# Patient Record
Sex: FEMALE | Race: WHITE | Employment: UNEMPLOYED | URBAN - METROPOLITAN AREA
[De-identification: names, ages, dates, MRNs, and addresses within clinical notes are randomized per-mention and may not be internally consistent; named-entity substitution may affect disease eponyms.]

---

## 2017-01-23 PROBLEM — I48.3 TYPICAL ATRIAL FLUTTER (HCC): Status: ACTIVE | Noted: 2017-01-23

## 2017-01-23 PROBLEM — Z01.810 PREOP CARDIOVASCULAR EXAM: Status: ACTIVE | Noted: 2017-01-23

## 2017-03-13 PROBLEM — F33.9 MAJOR DEPRESSIVE DISORDER, RECURRENT (HCC): Status: ACTIVE | Noted: 2017-03-13

## 2017-06-08 PROBLEM — F41.1 GENERALIZED ANXIETY DISORDER: Status: ACTIVE | Noted: 2017-06-08

## 2017-12-04 ENCOUNTER — ANESTHESIA EVENT (OUTPATIENT)
Dept: ENDOSCOPY | Age: 62
End: 2017-12-04
Payer: MEDICARE

## 2017-12-04 RX ORDER — HYDROMORPHONE HYDROCHLORIDE 2 MG/ML
0.5 INJECTION, SOLUTION INTRAMUSCULAR; INTRAVENOUS; SUBCUTANEOUS
Status: CANCELLED | OUTPATIENT
Start: 2017-12-04

## 2017-12-04 RX ORDER — ONDANSETRON 2 MG/ML
4 INJECTION INTRAMUSCULAR; INTRAVENOUS ONCE
Status: CANCELLED | OUTPATIENT
Start: 2017-12-04 | End: 2017-12-04

## 2017-12-04 RX ORDER — OXYCODONE HYDROCHLORIDE 5 MG/1
5 TABLET ORAL
Status: CANCELLED | OUTPATIENT
Start: 2017-12-04

## 2017-12-04 RX ORDER — SODIUM CHLORIDE, SODIUM LACTATE, POTASSIUM CHLORIDE, CALCIUM CHLORIDE 600; 310; 30; 20 MG/100ML; MG/100ML; MG/100ML; MG/100ML
75 INJECTION, SOLUTION INTRAVENOUS CONTINUOUS
Status: CANCELLED | OUTPATIENT
Start: 2017-12-04

## 2017-12-04 RX ORDER — NALOXONE HYDROCHLORIDE 0.4 MG/ML
0.1 INJECTION, SOLUTION INTRAMUSCULAR; INTRAVENOUS; SUBCUTANEOUS AS NEEDED
Status: CANCELLED | OUTPATIENT
Start: 2017-12-04 | End: 2017-12-04

## 2017-12-04 RX ORDER — DIPHENHYDRAMINE HYDROCHLORIDE 50 MG/ML
12.5 INJECTION, SOLUTION INTRAMUSCULAR; INTRAVENOUS ONCE
Status: CANCELLED | OUTPATIENT
Start: 2017-12-04 | End: 2017-12-04

## 2017-12-04 RX ORDER — SODIUM CHLORIDE 0.9 % (FLUSH) 0.9 %
5-10 SYRINGE (ML) INJECTION AS NEEDED
Status: CANCELLED | OUTPATIENT
Start: 2017-12-04

## 2017-12-04 RX ORDER — ACETAMINOPHEN 500 MG
500 TABLET ORAL ONCE
Status: CANCELLED | OUTPATIENT
Start: 2017-12-04 | End: 2017-12-04

## 2017-12-04 RX ORDER — OXYCODONE HYDROCHLORIDE 5 MG/1
10 TABLET ORAL
Status: CANCELLED | OUTPATIENT
Start: 2017-12-04

## 2017-12-05 ENCOUNTER — HOSPITAL ENCOUNTER (OUTPATIENT)
Age: 62
Setting detail: OUTPATIENT SURGERY
Discharge: HOME | End: 2017-12-05
Attending: INTERNAL MEDICINE | Admitting: INTERNAL MEDICINE
Payer: MEDICARE

## 2017-12-05 ENCOUNTER — ANESTHESIA (OUTPATIENT)
Dept: ENDOSCOPY | Age: 62
End: 2017-12-05
Payer: MEDICARE

## 2017-12-05 VITALS
SYSTOLIC BLOOD PRESSURE: 134 MMHG | RESPIRATION RATE: 16 BRPM | HEIGHT: 68 IN | BODY MASS INDEX: 43.95 KG/M2 | HEART RATE: 69 BPM | DIASTOLIC BLOOD PRESSURE: 68 MMHG | WEIGHT: 290 LBS | TEMPERATURE: 97.6 F | OXYGEN SATURATION: 96 %

## 2017-12-05 LAB — GLUCOSE BLD STRIP.AUTO-MCNC: 238 MG/DL (ref 65–100)

## 2017-12-05 PROCEDURE — 82962 GLUCOSE BLOOD TEST: CPT

## 2017-12-05 PROCEDURE — 74011250636 HC RX REV CODE- 250/636

## 2017-12-05 PROCEDURE — 74011000250 HC RX REV CODE- 250

## 2017-12-05 PROCEDURE — 76060000031 HC ANESTHESIA FIRST 0.5 HR: Performed by: INTERNAL MEDICINE

## 2017-12-05 PROCEDURE — 74011250636 HC RX REV CODE- 250/636: Performed by: ANESTHESIOLOGY

## 2017-12-05 PROCEDURE — 76040000025: Performed by: INTERNAL MEDICINE

## 2017-12-05 PROCEDURE — 74011636637 HC RX REV CODE- 636/637: Performed by: ANESTHESIOLOGY

## 2017-12-05 RX ORDER — GUAIFENESIN 100 MG/5ML
81 LIQUID (ML) ORAL
COMMUNITY

## 2017-12-05 RX ORDER — SODIUM CHLORIDE 0.9 % (FLUSH) 0.9 %
5-10 SYRINGE (ML) INJECTION AS NEEDED
Status: DISCONTINUED | OUTPATIENT
Start: 2017-12-05 | End: 2017-12-05 | Stop reason: HOSPADM

## 2017-12-05 RX ORDER — SODIUM CHLORIDE, SODIUM LACTATE, POTASSIUM CHLORIDE, CALCIUM CHLORIDE 600; 310; 30; 20 MG/100ML; MG/100ML; MG/100ML; MG/100ML
1000 INJECTION, SOLUTION INTRAVENOUS CONTINUOUS
Status: DISCONTINUED | OUTPATIENT
Start: 2017-12-05 | End: 2017-12-05 | Stop reason: HOSPADM

## 2017-12-05 RX ORDER — LIDOCAINE HYDROCHLORIDE 10 MG/ML
0.1 INJECTION INFILTRATION; PERINEURAL AS NEEDED
Status: DISCONTINUED | OUTPATIENT
Start: 2017-12-05 | End: 2017-12-05 | Stop reason: HOSPADM

## 2017-12-05 RX ORDER — SODIUM CHLORIDE 0.9 % (FLUSH) 0.9 %
5-10 SYRINGE (ML) INJECTION EVERY 8 HOURS
Status: DISCONTINUED | OUTPATIENT
Start: 2017-12-05 | End: 2017-12-05 | Stop reason: HOSPADM

## 2017-12-05 RX ORDER — PROPOFOL 10 MG/ML
INJECTION, EMULSION INTRAVENOUS AS NEEDED
Status: DISCONTINUED | OUTPATIENT
Start: 2017-12-05 | End: 2017-12-05 | Stop reason: HOSPADM

## 2017-12-05 RX ORDER — LIDOCAINE HYDROCHLORIDE 20 MG/ML
INJECTION, SOLUTION EPIDURAL; INFILTRATION; INTRACAUDAL; PERINEURAL AS NEEDED
Status: DISCONTINUED | OUTPATIENT
Start: 2017-12-05 | End: 2017-12-05 | Stop reason: HOSPADM

## 2017-12-05 RX ORDER — FENTANYL CITRATE 50 UG/ML
100 INJECTION, SOLUTION INTRAMUSCULAR; INTRAVENOUS AS NEEDED
Status: DISCONTINUED | OUTPATIENT
Start: 2017-12-05 | End: 2017-12-05 | Stop reason: HOSPADM

## 2017-12-05 RX ORDER — PROPOFOL 10 MG/ML
INJECTION, EMULSION INTRAVENOUS
Status: DISCONTINUED | OUTPATIENT
Start: 2017-12-05 | End: 2017-12-05 | Stop reason: HOSPADM

## 2017-12-05 RX ADMIN — LIDOCAINE HYDROCHLORIDE 100 MG: 20 INJECTION, SOLUTION EPIDURAL; INFILTRATION; INTRACAUDAL; PERINEURAL at 12:23

## 2017-12-05 RX ADMIN — INSULIN HUMAN 5 UNITS: 100 INJECTION, SOLUTION PARENTERAL at 12:10

## 2017-12-05 RX ADMIN — PROPOFOL 140 MCG/KG/MIN: 10 INJECTION, EMULSION INTRAVENOUS at 12:24

## 2017-12-05 RX ADMIN — SODIUM CHLORIDE, SODIUM LACTATE, POTASSIUM CHLORIDE, AND CALCIUM CHLORIDE 1000 ML: 600; 310; 30; 20 INJECTION, SOLUTION INTRAVENOUS at 12:02

## 2017-12-05 RX ADMIN — PROPOFOL 50 MG: 10 INJECTION, EMULSION INTRAVENOUS at 12:23

## 2017-12-05 NOTE — DISCHARGE INSTRUCTIONS
Gastrointestinal Esophagogastroduodenoscopy (EGD) - Upper Exam Discharge Instructions    1. Call Dr. Hortencia Mcgee for any problems or questions. 2. Contact the doctor's office for follow up appointment as directed. 3. Medication may cause drowsiness for several hours, therefore, do not drive or operate machinery for remainder of the day. 4. No alcohol today. 5. Ordinarily, you may resume regular diet and activity after exam unless otherwise specified by your physician. 6. For mild soreness in your throat you may use Cepacol throat lozenges or warm salt-water gargles as needed. Any additional instructions:    Normal EGD  Instructions given to Delta Menon and other family members. Gastrointestinal Colonoscopy/Flexible Sigmoidoscopy - Lower Exam Discharge Instructions  1. Call Dr. Hortencia Mcgee for any problems or questions. 2. Contact the doctors office for follow up appointment as directed  3. Medication may cause drowsiness for several hours, therefore, do not drive or operate machinery for remainder of the day. 4. No alcohol today. 5. Ordinarily, you may resume regular diet and activity after exam unless otherwise specified by your physician. 6. Because of air put into your colon during exam, you may experience some abdominal distension, relieved by the passage of gas, for several hours. 7. Contact your physician if you have any of the following:  a. Excessive amount of bleeding - large amount when having a bowel movement. Occasional specks of blood with bowel movement would not be unusual.  b. Severe abdominal pain  c. Fever or Chills  Any additional instructions:   Repeat colonoscopy with 2 day prep. Instructions given to Delta Menon and other family members.

## 2017-12-05 NOTE — ROUTINE PROCESS
VSS. Discharge instructions reviewed with patient and , Alicia Monroy and copy spent home with patient. Questions answered. Discharged via car, wheeled out by [employer]. IV discontinued prior to discharge. Dr. Eveline Brambila spoke with patients  prior to discharge and informed him that he could not complete the colonoscopy due to poor prep. Pt informed she will need a repeat colonoscopy.

## 2017-12-05 NOTE — ANESTHESIA POSTPROCEDURE EVALUATION
Post-Anesthesia Evaluation and Assessment    Patient: Rachel Dietz MRN: [id number]  SSN: [id number]    YOB: 1955  Age: 57 y.o. Sex: female       Cardiovascular Function/Vital Signs  Visit Vitals    /58    Pulse 73    Temp 36.4 °C (97.6 °F)    Resp 16    Ht 5' 8\" (1.727 m)    Wt 131.5 kg (290 lb)    SpO2 99%    BMI 44.09 kg/m2       Patient is status post total IV anesthesia anesthesia for Procedure(s):  ESOPHAGOGASTRODUODENOSCOPY (EGD)  COLONOSCOPY /   BMI 44. Nausea/Vomiting: None    Postoperative hydration reviewed and adequate. Pain:  Pain Scale 1: Visual (12/05/17 1244)  Pain Intensity 1: 0 (12/05/17 1244)   Managed    Neurological Status: At baseline    Mental Status and Level of Consciousness: Arousable    Pulmonary Status:   O2 Device: Nasal cannula (12/05/17 1246)   Adequate oxygenation and airway patent    Complications related to anesthesia: None    Post-anesthesia assessment completed.  No concerns    Signed By: Promise Layne MD     December 5, 2017

## 2017-12-05 NOTE — IP AVS SNAPSHOT
Jackiekeren De La Rosa 
 
 
 [address] 
[phone] Patient: Dejon Tate MRN: [id number] VGN:[id number] About your hospitalization You were admitted on:  December 5, 2017 You last received care in the:  [facility] You were discharged on:  December 5, 2017 Why you were hospitalized Your primary diagnosis was:  Not on File Discharge Orders None A check savi indicates which time of day the medication should be taken. My Medications ASK your physician about these medications Instructions Each Dose to Equal  
 Morning Noon Evening Bedtime  
 amitriptyline 25 mg tablet Commonly known as:  ELAVIL Your last dose was: Your next dose is: Take one in the am and up to 4 at night  
     
   
   
   
  
 aspirin 81 mg chewable tablet Your last dose was: Your next dose is: Take 81 mg by mouth daily. Indications: prevention of cerebrovascular accident 81 mg  
    
   
   
   
  
 atenolol 25 mg tablet Commonly known as:  TENORMIN Your last dose was: Your next dose is: Take 25 mg by mouth. 25 mg  
    
   
   
   
  
 docusate sodium 100 mg capsule Commonly known as:  Chetan Brown Your last dose was: Your next dose is: Take 100 mg by mouth. 100 mg  
    
   
   
   
  
 esomeprazole 20 mg capsule Commonly known as:  Celestine Zapien Your last dose was: Your next dose is: Take 20 mg by mouth. 20 mg  
    
   
   
   
  
 estradiol 0.5 mg tablet Commonly known as:  ESTRACE Your last dose was: Your next dose is: Take 0.5 mg by mouth. 0.5 mg  
    
   
   
   
  
 * insulin  unit/mL injection Commonly known as:  Brenton Marquez Your last dose was: Your next dose is:    
   
   
 15 Units by SubCUTAneous route. 15 Units * HumuLIN N 100 unit/mL injection Generic drug:  insulin NPH Your last dose was: Your next dose is:    
   
   
 80 Units by SubCUTAneous route every morning. Also takes 40 units at 2100 nightly 80 Units  
    
   
   
   
  
 hydroCHLOROthiazide 25 mg tablet Commonly known as:  HYDRODIURIL Your last dose was: Your next dose is: Take 25 mg by mouth. 25 mg  
    
   
   
   
  
 insulin regular 100 unit/mL injection Commonly known as:  Innairirichard Toussaint, HUMULIN R Your last dose was: Your next dose is:    
   
   
 20 Units by SubCUTAneous route. 20 Units  
    
   
   
   
  
 lisinopril 10 mg tablet Commonly known as:  Duane Fernandez Your last dose was: Your next dose is: Take  by mouth daily. * lisinopril-hydroCHLOROthiazide 10-12.5 mg per tablet Commonly known as:  Jonnathane Hardpadmaja Your last dose was: Your next dose is: Take 2 Tabs by mouth daily. 2 Tab * lisinopril-hydroCHLOROthiazide 20-12.5 mg per tablet Commonly known as:  Loyce Harden Your last dose was: Your next dose is: Take 1 Tab by mouth. 1 Tab  
    
   
   
   
  
 loratadine 10 mg tablet Commonly known as:  Smiley [place] Your last dose was: Your next dose is:    
   
   
 Strength: 10 mg; Form: tablet; SIG: take 1 tab(s) orally once a day LYRICA 100 mg capsule Generic drug:  pregabalin Your last dose was: Your next dose is: Take 100 mg by mouth nightly. 100 mg  
    
   
   
   
  
 metoclopramide HCl 10 mg tablet Commonly known as:  REGLAN Your last dose was: Your next dose is: Take 10 mg by mouth. 10 mg  
    
   
   
   
  
 oxyCODONE-acetaminophen  mg per tablet Commonly known as:  PERCOCET 10  
   
 Your last dose was: Your next dose is: Take 1 Tab by mouth four (4) times daily as needed for Pain. 1 Tab OxyCONTIN 10 mg ER tablet Generic drug:  oxyCODONE ER Your last dose was: Your next dose is: Take 10 mg by mouth. 10 mg  
    
   
   
   
  
 oxyMORphone 5 mg ER tablet Commonly known as:  OPANA SR Your last dose was: Your next dose is: Take 5 mg by mouth. 5 mg  
    
   
   
   
  
 potassium chloride 20 mEq packet Commonly known as:  KLOR-CON Your last dose was: Your next dose is: Take 20 mEq by mouth daily. 20 mEq  
    
   
   
   
  
 predniSONE 10 mg tablet Commonly known as:  Aldo Ba Your last dose was: Your next dose is: Take 10 mg by mouth. 10 mg  
    
   
   
   
  
 PriLOSEC 20 mg capsule Generic drug:  omeprazole Your last dose was: Your next dose is: Take 20 mg by mouth two (2) times a day. 20 mg  
    
   
   
   
  
 promethazine 25 mg tablet Commonly known as:  PHENERGAN Your last dose was: Your next dose is: Take 25 mg by mouth every eight (8) hours as needed. 25 mg  
    
   
   
   
  
 propranolol 10 mg tablet Commonly known as:  INDERAL Your last dose was: Your next dose is: Take 10 mg by mouth. 10 mg  
    
   
   
   
  
 SUMAtriptan 100 mg tablet Commonly known as:  IMITREX Your last dose was: Your next dose is: Take 100 mg by mouth. 100 mg SYNTHROID 175 mcg tablet Generic drug:  levothyroxine Your last dose was: Your next dose is: Take 175 mcg by mouth. 175 mcg  
    
   
   
   
  
 tiZANidine 4 mg tablet Commonly known as:  Del Mount Your last dose was: Your next dose is: Take 4 mg by mouth. 4 mg TRILEPTAL 600 mg tablet Generic drug:  OXcarbaxepine Your last dose was: Your next dose is: Take 600 mg by mouth two (2) times a day. 600 mg * Notice: This list has 4 medication(s) that are the same as other medications prescribed for you. Read the directions carefully, and ask your doctor or other care provider to review them with you. Discharge Instructions Gastrointestinal Esophagogastroduodenoscopy (EGD) - Upper Exam Discharge Instructions 1. Call Dr. Karlee Bailey for any problems or questions. 2. Contact the doctor's office for follow up appointment as directed. 3. Medication may cause drowsiness for several hours, therefore, do not drive or operate machinery for remainder of the day. 4. No alcohol today. 5. Ordinarily, you may resume regular diet and activity after exam unless otherwise specified by your physician. 6. For mild soreness in your throat you may use Cepacol throat lozenges or warm salt-water gargles as needed. Any additional instructions:   
Normal EGD Instructions given to Brijesh Gee and other family members. Gastrointestinal Colonoscopy/Flexible Sigmoidoscopy - Lower Exam Discharge Instructions 1. Call Dr. Karlee Bailey for any problems or questions. 2. Contact the doctors office for follow up appointment as directed 3. Medication may cause drowsiness for several hours, therefore, do not drive or operate machinery for remainder of the day. 4. No alcohol today. 5. Ordinarily, you may resume regular diet and activity after exam unless otherwise specified by your physician. 6. Because of air put into your colon during exam, you may experience some abdominal distension, relieved by the passage of gas, for several hours. 7. Contact your physician if you have any of the following: 
a.  Excessive amount of bleeding  large amount when having a bowel movement. Occasional specks of blood with bowel movement would not be unusual. 
b. Severe abdominal pain 
c. Fever or Chills Any additional instructions:  
Repeat colonoscopy with 2 day prep. Instructions given to Tierney Brink and other family members. Introducing [facility]! [facility] introduces [email or web address] patient portal. Now you can access parts of your medical record, email your doctor's office, and request medication refills online. 1. In your internet browser, go to [email or web address] 2. Click on the First Time User? Click Here link in the Sign In box. You will see the New Member Sign Up page. 3. Enter your [employer] Access Code exactly as it appears below. You will not need to use this code after youve completed the sign-up process. If you do not sign up before the expiration date, you must request a new code. · [employer] Access Code: [email or web address] Expires: 3/1/2018  3:05 PM 
 
4. Enter the last four digits of your Social Security Number (xxxx) and Date of Birth (mm/dd/yyyy) as indicated and click Submit. You will be taken to the next sign-up page. 5. Create a [employer] ID. This will be your [employer] login ID and cannot be changed, so think of one that is secure and easy to remember. 6. Create a [employer] password. You can change your password at any time. 7. Enter your Password Reset Question and Answer. This can be used at a later time if you forget your password. 8. Enter your e-mail address. You will receive e-mail notification when new information is available in [address]. 9. Click Sign Up. You can now view and download portions of your medical record. 10. Click the Download Summary menu link to download a portable copy of your medical information. If you have questions, please visit the Frequently Asked Questions section of the [email or web address] website. Remember, [employer] is NOT to be used for urgent needs. For medical emergencies, dial 911. Now available from your iPhone and Android! Providers Seen During Your Hospitalization Provider Specialty Primary office phone Doris Foreman MD Gastroenterology [phone] Your Primary Care Physician (PCP) Primary Care Physician Office Phone Office Fax [address] [facility] You are allergic to the following Allergen Reactions Latex, Natural Rubber Itching Latex Rash  
 itching Benzodiazepines Unknown (comments) Dr. Blanca Mcintyre didn't wont medication combined with other medications pt was taking Calcium Channel Blocking Agent Diltiazem Analogues Other (comments) Nsaids (Non-Steroidal Anti-Inflammatory Drug) Swelling Other (comments) Elevated Blood Pressure; leg edema Recent Documentation Height Weight BMI OB Status Smoking Status 1.727 m 131.5 kg 44.09 kg/m2 Hysterectomy Never Smoker Emergency Contacts Name Discharge Info Relation Home Work Mobile Anshul Coleman  Spouse [3] [phone] Patient Belongings The following personal items are in your possession at time of discharge: 
  Dental Appliances: None  Visual Aid: Glasses (WITH ) Please provide this summary of care documentation to your next provider. Signatures-by signing, you are acknowledging that this After Visit Summary has been reviewed with you and you have received a copy. Patient Signature:  ____________________________________________________________ Date:  ____________________________________________________________  
  
[facility] Provider Signature:  ____________________________________________________________ Date:  ____________________________________________________________

## 2017-12-05 NOTE — PROCEDURES
Endoscopic Gastroduodenoscopy Procedure Note    Indications: GERD, abdominal pain    Anesthesia/Sedation: MAC IV     Pre-Procedure Physical:    Current Facility-Administered Medications   Medication Dose Route Frequency    lidocaine (XYLOCAINE) 10 mg/mL (1 %) injection 0.1 mL  0.1 mL SubCUTAneous PRN    lactated Ringers infusion 1,000 mL  1,000 mL IntraVENous CONTINUOUS    lactated ringers bolus infusion 500 mL  500 mL IntraVENous ONCE    sodium chloride (NS) flush 5-10 mL  5-10 mL IntraVENous Q8H    sodium chloride (NS) flush 5-10 mL  5-10 mL IntraVENous PRN    fentaNYL citrate (PF) injection 100 mcg  100 mcg IntraVENous PRN      Latex, natural rubber; Latex; Benzodiazepines; Calcium channel blocking agent diltiazem analogues; and Nsaids (non-steroidal anti-inflammatory drug)    Patient Vitals for the past 8 hrs:   BP Temp Pulse Resp SpO2 Height Weight   12/05/17 1155 - - - - 95 % - -   12/05/17 1140 145/87 - 66 16 - - -   12/05/17 1117 - 97.8 °F (36.6 °C) - - - 5' 8\" (1.727 m) 131.5 kg (290 lb)       Exam      Airway: clear   Heart: normal S1and S2    Lungs: clear bilateral  Abdomen: soft, nontender, bowel sounds present and normal in all quads   Mental Status: awake, alert and oriented to person, place and time          Procedure Details     Informed consent was obtained for the procedure, including conscious sedation. Risks of pancreatitis, infection, perforation, hemorrhage, adverse drug reaction and aspiration were discussed. The patient was placed in the left lateral decubitus position. Based on the pre-procedure assessment, including review of the patient's medical history, medications, allergies, and review of systems, she had been deemed to be an appropriate candidate for conscious sedation; she was therefore sedated with the medications listed below. She was monitored continuously with ECG tracing, pulse oximetry, blood pressure monitoring, and direct observation.       The EGD gastroscope was inserted into the mouth and advanced under direct vision to the second portion of the duodenum. A careful inspection was made as the gastroscope was withdrawn, including a retroflexed view of the proximal stomach; findings and interventions are described below. Appropriate photodocumentation was obtained. Findings:   Esophagus- Normal.  Stomach- Normal.  Duodenum- Normal.    Therapies: None    Specimens: None    Estimated Blood Loss: 0 cc           Complications:   None; patient tolerated the procedure well. Attending Attestation:  I performed the procedure. Impression:    Normal EGD.     Recommendations:  Continue omeprazole 20 bid
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
## 2017-12-05 NOTE — ANESTHESIA PREPROCEDURE EVALUATION
Anesthetic History   No history of anesthetic complications            Review of Systems / Medical History  Pertinent labs reviewed    Pulmonary        Sleep apnea: CPAP           Neuro/Psych         TIA     Cardiovascular    Hypertension        Dysrhythmias : atrial fibrillation      Exercise tolerance: <4 METS  Comments: PFO, Afib    Denies CP, SOB, Palps,Syncope, Fatigue   GI/Hepatic/Renal     GERD: well controlled           Endo/Other        Morbid obesity and arthritis     Other Findings              Physical Exam    Airway  Mallampati: III  TM Distance: 4 - 6 cm  Neck ROM: normal range of motion   Mouth opening: Normal     Cardiovascular    Rhythm: regular  Rate: normal         Dental  No notable dental hx       Pulmonary  Breath sounds clear to auscultation               Abdominal  GI exam deferred       Other Findings            Anesthetic Plan    ASA: 3  Anesthesia type: total IV anesthesia          Induction: Intravenous  Anesthetic plan and risks discussed with: Patient

## 2017-12-05 NOTE — PROCEDURES
Colonoscopy Procedure Note    Indications: Constipation, [address] polyps    Anesthesia/Sedation: MAC IV     Pre-Procedure Physical:  Current Facility-Administered Medications   Medication Dose Route Frequency    lidocaine (XYLOCAINE) 10 mg/mL (1 %) injection 0.1 mL  0.1 mL SubCUTAneous PRN    lactated Ringers infusion 1,000 mL  1,000 mL IntraVENous CONTINUOUS    lactated ringers bolus infusion 500 mL  500 mL IntraVENous ONCE    sodium chloride (NS) flush 5-10 mL  5-10 mL IntraVENous Q8H    sodium chloride (NS) flush 5-10 mL  5-10 mL IntraVENous PRN    fentaNYL citrate (PF) injection 100 mcg  100 mcg IntraVENous PRN     Facility-Administered Medications Ordered in Other Encounters   Medication Dose Route Frequency    propofol (DIPRIVAN) 10 mg/mL injection    CONTINUOUS    propofol (DIPRIVAN) 10 mg/mL injection    PRN    lidocaine (PF) (XYLOCAINE) 20 mg/mL (2 %) injection   IntraVENous PRN      Latex, natural rubber; Latex; Benzodiazepines; Calcium channel blocking agent diltiazem analogues; and Nsaids (non-steroidal anti-inflammatory drug)    Patient Vitals for the past 8 hrs:   BP Temp Pulse Resp SpO2 Height Weight   12/05/17 1155 - - - - 95 % - -   12/05/17 1140 145/87 - 66 16 - - -   12/05/17 1117 - 97.8 °F (36.6 °C) - - - 5' 8\" (1.727 m) 131.5 kg (290 lb)       Exam:    Airway: clear   Heart: normal S1and S2    Lungs: clear bilateral  Abdomen: soft, nontender, bowel sounds present and normal in all quads   Mental Status: awake, alert and oriented to person, place and time        Procedure Details      Informed consent was obtained for the procedure, including sedation. Risks of perforation, hemorrhage, adverse drug reaction and aspiration were discussed. The patient was placed in the left lateral decubitus position.   Based on the pre-procedure assessment, including review of the patient's medical history, medications, allergies, and review of systems, she had been deemed to be an appropriate candidate for conscious sedation; she was therefore sedated with the medications listed below. The patient was monitored continuously with ECG tracing, pulse oximetry, blood pressure monitoring, and direct observations. A rectal examination was performed. The colonoscope was inserted into the rectum and advanced under direct vision to the transverse colon. The quality of the colonic preparation was poor. A careful inspection was made as the colonoscope was withdrawn, including a retroflexed view of the rectum; findings and interventions are described below. Appropriate photodocumentation was obtained. Findings:   Poor prep so colonoscopy incomplete. Specimens: None    Estimated Blood Loss: 0 cc           Complications: None; patient tolerated the procedure well. Attending Attestation: I performed the procedure. Impression:    Poor prep. Colonoscopy incomplete. Recommendations:   Repeat colonoscopy with 2 day prep.

## 2017-12-05 NOTE — IP AVS SNAPSHOT
[address] 
[phone] Patient: Nydia Oswald MRN: [id number] ANANYA:[birth year] My Medications ASK your physician about these medications Instructions Each Dose to Equal  
 Morning Noon Evening Bedtime  
 amitriptyline 25 mg tablet Commonly known as:  ELAVIL Your last dose was: Your next dose is: Take one in the am and up to 4 at night  
     
   
   
   
  
 aspirin 81 mg chewable tablet Your last dose was: Your next dose is: Take 81 mg by mouth daily. Indications: prevention of cerebrovascular accident 81 mg  
    
   
   
   
  
 atenolol 25 mg tablet Commonly known as:  TENORMIN Your last dose was: Your next dose is: Take 25 mg by mouth. 25 mg  
    
   
   
   
  
 docusate sodium 100 mg capsule Commonly known as:  Elliot Austin Your last dose was: Your next dose is: Take 100 mg by mouth. 100 mg  
    
   
   
   
  
 esomeprazole 20 mg capsule Commonly known as:  [place] Darrin Your last dose was: Your next dose is: Take 20 mg by mouth. 20 mg  
    
   
   
   
  
 estradiol 0.5 mg tablet Commonly known as:  ESTRACE Your last dose was: Your next dose is: Take 0.5 mg by mouth. 0.5 mg  
    
   
   
   
  
 * insulin  unit/mL injection Commonly known as:  Courtney Stewart Your last dose was: Your next dose is:    
   
   
 15 Units by SubCUTAneous route. 15 Units * HumuLIN N 100 unit/mL injection Generic drug:  insulin NPH Your last dose was: Your next dose is:    
   
   
 80 Units by SubCUTAneous route every morning. Also takes 40 units at 2100 nightly 80 Units  
    
   
   
   
  
 hydroCHLOROthiazide 25 mg tablet Commonly known as:  HYDRODIURIL  
   
 Your last dose was: Your next dose is: Take 25 mg by mouth. 25 mg  
    
   
   
   
  
 insulin regular 100 unit/mL injection Commonly known as:  JALEN Marquez Your last dose was: Your next dose is:    
   
   
 20 Units by SubCUTAneous route. 20 Units  
    
   
   
   
  
 lisinopril 10 mg tablet Commonly known as:  Vivian November Your last dose was: Your next dose is: Take  by mouth daily. * lisinopril-hydroCHLOROthiazide 10-12.5 mg per tablet Commonly known as:  Amaya Quintero Your last dose was: Your next dose is: Take 2 Tabs by mouth daily. 2 Tab * lisinopril-hydroCHLOROthiazide 20-12.5 mg per tablet Commonly known as:  Amaya Quintero Your last dose was: Your next dose is: Take 1 Tab by mouth. 1 Tab  
    
   
   
   
  
 loratadine 10 mg tablet Commonly known as:  Waqas Lima Your last dose was: Your next dose is:    
   
   
 Strength: 10 mg; Form: tablet; SIG: take 1 tab(s) orally once a day LYRICA 100 mg capsule Generic drug:  pregabalin Your last dose was: Your next dose is: Take 100 mg by mouth nightly. 100 mg  
    
   
   
   
  
 metoclopramide HCl 10 mg tablet Commonly known as:  REGLAN Your last dose was: Your next dose is: Take 10 mg by mouth. 10 mg  
    
   
   
   
  
 oxyCODONE-acetaminophen  mg per tablet Commonly known as:  PERCOCET 10 Your last dose was: Your next dose is: Take 1 Tab by mouth four (4) times daily as needed for Pain. 1 Tab OxyCONTIN 10 mg ER tablet Generic drug:  oxyCODONE ER Your last dose was: Your next dose is: Take 10 mg by mouth.   
 10 mg  
    
   
   
   
  
 oxyMORphone 5 mg ER tablet Commonly known as:  OPANA SR Your last dose was: Your next dose is: Take 5 mg by mouth. 5 mg  
    
   
   
   
  
 potassium chloride 20 mEq packet Commonly known as:  KLOR-CON Your last dose was: Your next dose is: Take 20 mEq by mouth daily. 20 mEq  
    
   
   
   
  
 predniSONE 10 mg tablet Commonly known as:  Ghulam Lever Your last dose was: Your next dose is: Take 10 mg by mouth. 10 mg  
    
   
   
   
  
 PriLOSEC 20 mg capsule Generic drug:  omeprazole Your last dose was: Your next dose is: Take 20 mg by mouth two (2) times a day. 20 mg  
    
   
   
   
  
 promethazine 25 mg tablet Commonly known as:  PHENERGAN Your last dose was: Your next dose is: Take 25 mg by mouth every eight (8) hours as needed. 25 mg  
    
   
   
   
  
 propranolol 10 mg tablet Commonly known as:  INDERAL Your last dose was: Your next dose is: Take 10 mg by mouth. 10 mg  
    
   
   
   
  
 SUMAtriptan 100 mg tablet Commonly known as:  IMITREX Your last dose was: Your next dose is: Take 100 mg by mouth. 100 mg SYNTHROID 175 mcg tablet Generic drug:  levothyroxine Your last dose was: Your next dose is: Take 175 mcg by mouth. 175 mcg  
    
   
   
   
  
 tiZANidine 4 mg tablet Commonly known as:  Antonietta Caitie Your last dose was: Your next dose is: Take 4 mg by mouth. 4 mg TRILEPTAL 600 mg tablet Generic drug:  OXcarbaxepine Your last dose was: Your next dose is: Take 600 mg by mouth two (2) times a day. 600 mg * Notice:   This list has 4 medication(s) that are the same as other medications prescribed for you. Read the directions carefully, and ask your doctor or other care provider to review them with you.

## 2018-05-25 ENCOUNTER — ANESTHESIA EVENT (OUTPATIENT)
Dept: ENDOSCOPY | Age: 63
End: 2018-05-25
Payer: MEDICARE

## 2018-05-25 RX ORDER — ACETAMINOPHEN 500 MG
500 TABLET ORAL ONCE
Status: CANCELLED | OUTPATIENT
Start: 2018-05-25 | End: 2018-05-25

## 2018-05-25 RX ORDER — NALOXONE HYDROCHLORIDE 0.4 MG/ML
0.1 INJECTION, SOLUTION INTRAMUSCULAR; INTRAVENOUS; SUBCUTANEOUS AS NEEDED
Status: CANCELLED | OUTPATIENT
Start: 2018-05-25 | End: 2018-05-25

## 2018-05-25 RX ORDER — SODIUM CHLORIDE 0.9 % (FLUSH) 0.9 %
5-10 SYRINGE (ML) INJECTION AS NEEDED
Status: CANCELLED | OUTPATIENT
Start: 2018-05-25

## 2018-05-25 RX ORDER — OXYCODONE HYDROCHLORIDE 5 MG/1
10 TABLET ORAL
Status: CANCELLED | OUTPATIENT
Start: 2018-05-25

## 2018-05-25 RX ORDER — HYDROMORPHONE HYDROCHLORIDE 2 MG/ML
0.5 INJECTION, SOLUTION INTRAMUSCULAR; INTRAVENOUS; SUBCUTANEOUS
Status: CANCELLED | OUTPATIENT
Start: 2018-05-25

## 2018-05-25 RX ORDER — OXYCODONE HYDROCHLORIDE 5 MG/1
5 TABLET ORAL
Status: CANCELLED | OUTPATIENT
Start: 2018-05-25

## 2018-05-25 RX ORDER — ONDANSETRON 2 MG/ML
4 INJECTION INTRAMUSCULAR; INTRAVENOUS ONCE
Status: CANCELLED | OUTPATIENT
Start: 2018-05-25 | End: 2018-05-25

## 2018-05-25 RX ORDER — SODIUM CHLORIDE, SODIUM LACTATE, POTASSIUM CHLORIDE, CALCIUM CHLORIDE 600; 310; 30; 20 MG/100ML; MG/100ML; MG/100ML; MG/100ML
75 INJECTION, SOLUTION INTRAVENOUS CONTINUOUS
Status: CANCELLED | OUTPATIENT
Start: 2018-05-25

## 2018-05-25 RX ORDER — DIPHENHYDRAMINE HYDROCHLORIDE 50 MG/ML
12.5 INJECTION, SOLUTION INTRAMUSCULAR; INTRAVENOUS ONCE
Status: CANCELLED | OUTPATIENT
Start: 2018-05-25 | End: 2018-05-25

## 2018-05-29 ENCOUNTER — ANESTHESIA (OUTPATIENT)
Dept: ENDOSCOPY | Age: 63
End: 2018-05-29
Payer: MEDICARE

## 2018-05-29 ENCOUNTER — HOSPITAL ENCOUNTER (OUTPATIENT)
Age: 63
Setting detail: OUTPATIENT SURGERY
Discharge: HOME | End: 2018-05-29
Attending: INTERNAL MEDICINE | Admitting: INTERNAL MEDICINE
Payer: MEDICARE

## 2018-05-29 VITALS
HEART RATE: 73 BPM | DIASTOLIC BLOOD PRESSURE: 70 MMHG | RESPIRATION RATE: 14 BRPM | SYSTOLIC BLOOD PRESSURE: 153 MMHG | TEMPERATURE: 96.8 F | OXYGEN SATURATION: 98 %

## 2018-05-29 LAB — GLUCOSE BLD STRIP.AUTO-MCNC: 143 MG/DL (ref 65–100)

## 2018-05-29 PROCEDURE — 76060000032 HC ANESTHESIA 0.5 TO 1 HR: Performed by: INTERNAL MEDICINE

## 2018-05-29 PROCEDURE — 82962 GLUCOSE BLOOD TEST: CPT

## 2018-05-29 PROCEDURE — 76040000025: Performed by: INTERNAL MEDICINE

## 2018-05-29 PROCEDURE — 74011250636 HC RX REV CODE- 250/636

## 2018-05-29 PROCEDURE — 74011250636 HC RX REV CODE- 250/636: Performed by: ANESTHESIOLOGY

## 2018-05-29 PROCEDURE — 74011000250 HC RX REV CODE- 250

## 2018-05-29 RX ORDER — SODIUM CHLORIDE, SODIUM LACTATE, POTASSIUM CHLORIDE, CALCIUM CHLORIDE 600; 310; 30; 20 MG/100ML; MG/100ML; MG/100ML; MG/100ML
1000 INJECTION, SOLUTION INTRAVENOUS CONTINUOUS
Status: DISCONTINUED | OUTPATIENT
Start: 2018-05-29 | End: 2018-05-29 | Stop reason: HOSPADM

## 2018-05-29 RX ORDER — SODIUM CHLORIDE 0.9 % (FLUSH) 0.9 %
5-10 SYRINGE (ML) INJECTION AS NEEDED
Status: DISCONTINUED | OUTPATIENT
Start: 2018-05-29 | End: 2018-05-29 | Stop reason: HOSPADM

## 2018-05-29 RX ORDER — PROPOFOL 10 MG/ML
INJECTION, EMULSION INTRAVENOUS
Status: DISCONTINUED | OUTPATIENT
Start: 2018-05-29 | End: 2018-05-29 | Stop reason: HOSPADM

## 2018-05-29 RX ORDER — FENTANYL CITRATE 50 UG/ML
100 INJECTION, SOLUTION INTRAMUSCULAR; INTRAVENOUS AS NEEDED
Status: DISCONTINUED | OUTPATIENT
Start: 2018-05-29 | End: 2018-05-29 | Stop reason: HOSPADM

## 2018-05-29 RX ORDER — SODIUM CHLORIDE 0.9 % (FLUSH) 0.9 %
5-10 SYRINGE (ML) INJECTION EVERY 8 HOURS
Status: DISCONTINUED | OUTPATIENT
Start: 2018-05-29 | End: 2018-05-29 | Stop reason: HOSPADM

## 2018-05-29 RX ORDER — LIDOCAINE HYDROCHLORIDE 10 MG/ML
0.1 INJECTION INFILTRATION; PERINEURAL AS NEEDED
Status: DISCONTINUED | OUTPATIENT
Start: 2018-05-29 | End: 2018-05-29 | Stop reason: HOSPADM

## 2018-05-29 RX ORDER — LIDOCAINE HYDROCHLORIDE 20 MG/ML
INJECTION, SOLUTION EPIDURAL; INFILTRATION; INTRACAUDAL; PERINEURAL AS NEEDED
Status: DISCONTINUED | OUTPATIENT
Start: 2018-05-29 | End: 2018-05-29 | Stop reason: HOSPADM

## 2018-05-29 RX ORDER — PROPOFOL 10 MG/ML
INJECTION, EMULSION INTRAVENOUS AS NEEDED
Status: DISCONTINUED | OUTPATIENT
Start: 2018-05-29 | End: 2018-05-29 | Stop reason: HOSPADM

## 2018-05-29 RX ADMIN — SODIUM CHLORIDE, SODIUM LACTATE, POTASSIUM CHLORIDE, AND CALCIUM CHLORIDE 1000 ML: 600; 310; 30; 20 INJECTION, SOLUTION INTRAVENOUS at 09:18

## 2018-05-29 RX ADMIN — PROPOFOL 70 MG: 10 INJECTION, EMULSION INTRAVENOUS at 09:39

## 2018-05-29 RX ADMIN — LIDOCAINE HYDROCHLORIDE 40 MG: 20 INJECTION, SOLUTION EPIDURAL; INFILTRATION; INTRACAUDAL; PERINEURAL at 09:42

## 2018-05-29 RX ADMIN — PROPOFOL 140 MCG/KG/MIN: 10 INJECTION, EMULSION INTRAVENOUS at 09:40

## 2018-05-29 RX ADMIN — LIDOCAINE HYDROCHLORIDE 40 MG: 20 INJECTION, SOLUTION EPIDURAL; INFILTRATION; INTRACAUDAL; PERINEURAL at 09:39

## 2018-05-29 NOTE — ANESTHESIA POSTPROCEDURE EVALUATION
Post-Anesthesia Evaluation and Assessment    Patient: Vianney Reina MRN: [id number]  SSN: [id number]    YOB: 1955  Age: 70 y.o. Sex: female       Cardiovascular Function/Vital Signs  Visit Vitals    /70    Pulse 73    Temp 36 °C (96.8 °F)    Resp 14    SpO2 98%    Breastfeeding No       Patient is status post total IV anesthesia anesthesia for Procedure(s):  COLONOSCOPY/ 45. Nausea/Vomiting: None    Postoperative hydration reviewed and adequate. Pain:  Pain Scale 1: Numeric (0 - 10) (05/29/18 1018)  Pain Intensity 1: 0 (05/29/18 1018)   Managed    Neurological Status: At baseline    Mental Status and Level of Consciousness: Arousable    Pulmonary Status:   O2 Device: Room air (05/29/18 1018)   Adequate oxygenation and airway patent    Complications related to anesthesia: None    Post-anesthesia assessment completed.  No concerns    Signed By: Cairdad Francis MD     May 29, 2018

## 2018-05-29 NOTE — DISCHARGE INSTRUCTIONS
Gastrointestinal Colonoscopy/Flexible Sigmoidoscopy - Lower Exam Discharge Instructions  1. Call Dr. Ana Laura Maria at [phone] for any problems or questions. 2. Contact the doctors office for follow up appointment as directed  3. Medication may cause drowsiness for several hours, therefore, do not drive or operate machinery for remainder of the day. 4. No alcohol today. 5. Ordinarily, you may resume regular diet and activity after exam unless otherwise specified by your physician. 6. Because of air put into your colon during exam, you may experience some abdominal distension, relieved by the passage of gas, for several hours. 7. Contact your physician if you have any of the following:  a. Excessive amount of bleeding - large amount when having a bowel movement. Occasional specks of blood with bowel movement would not be unusual.  b. Severe abdominal pain  c. Fever or Chills  Any additional instructions:  Repeat colonoscopy in 5 years with a 2 day prep; office follow up in 3 months      Instructions given to Sylvain De Los Santos and other family members.
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
## 2018-05-29 NOTE — PROCEDURES
Colonoscopy Procedure Note    Indications: Constipation    Anesthesia/Sedation: MAC IV     Pre-Procedure Physical:  Current Facility-Administered Medications   Medication Dose Route Frequency    lidocaine (XYLOCAINE) 10 mg/mL (1 %) injection 0.1 mL  0.1 mL SubCUTAneous PRN    lactated Ringers infusion 1,000 mL  1,000 mL IntraVENous CONTINUOUS    lactated ringers bolus infusion 500 mL  500 mL IntraVENous ONCE    sodium chloride (NS) flush 5-10 mL  5-10 mL IntraVENous Q8H    sodium chloride (NS) flush 5-10 mL  5-10 mL IntraVENous PRN    fentaNYL citrate (PF) injection 100 mcg  100 mcg IntraVENous PRN      Latex, natural rubber; Latex; Benzodiazepines; Calcium channel blocking agent diltiazem analogues; and Nsaids (non-steroidal anti-inflammatory drug)    Patient Vitals for the past 8 hrs:   BP Temp Pulse Resp SpO2   05/29/18 0912 183/83 - 73 18 97 %   05/29/18 0851 - 97.8 °F (36.6 °C) - - -       Exam:    Airway: clear   Heart: normal S1and S2    Lungs: clear bilateral  Abdomen: soft, nontender, bowel sounds present and normal in all quads   Mental Status: awake, alert and oriented to person, place and time        Procedure Details      Informed consent was obtained for the procedure, including sedation. Risks of perforation, hemorrhage, adverse drug reaction and aspiration were discussed. The patient was placed in the left lateral decubitus position. Based on the pre-procedure assessment, including review of the patient's medical history, medications, allergies, and review of systems, she had been deemed to be an appropriate candidate for conscious sedation; she was therefore sedated with the medications listed below. The patient was monitored continuously with ECG tracing, pulse oximetry, blood pressure monitoring, and direct observations. A rectal examination was performed. The colonoscope was inserted into the rectum and advanced under direct vision to the cecum.  The quality of the colonic preparation was barely fair. A careful inspection was made as the colonoscope was withdrawn, including a retroflexed view of the rectum; findings and interventions are described below. Appropriate photodocumentation was obtained. Findings:   Constipation  Normal colon    Specimens: None    Estimated Blood Loss: 0 cc           Complications: None; patient tolerated the procedure well. Attending Attestation: I performed the procedure. Impression:    Normal colon. Obviously has severe constipation.     Recommendations:   Miralax 2 scoops tid  Back to office in 3 mos

## 2018-05-29 NOTE — ROUTINE PROCESS
VSS. No complaints noted. Education given and reviewed with  who voiced understanding. Pt wheeled out via wheelchair by Carli Wolf.

## 2018-10-16 PROBLEM — R01.1 MURMUR: Status: ACTIVE | Noted: 2018-10-16

## 2019-02-04 PROBLEM — R07.89 OTHER CHEST PAIN: Status: ACTIVE | Noted: 2019-02-04

## 2019-09-25 PROBLEM — Z01.810 PREOP CARDIOVASCULAR EXAM: Status: RESOLVED | Noted: 2017-01-23 | Resolved: 2019-09-25

## 2020-04-21 PROBLEM — G47.33 OSA (OBSTRUCTIVE SLEEP APNEA): Status: ACTIVE | Noted: 2020-04-21

## 2020-04-28 ENCOUNTER — HOSPITAL ENCOUNTER (OUTPATIENT)
Dept: ULTRASOUND IMAGING | Age: 65
Discharge: HOME | End: 2020-04-28
Attending: INTERNAL MEDICINE

## 2020-04-28 DIAGNOSIS — I10 ESSENTIAL HYPERTENSION: ICD-10-CM

## 2020-04-28 DIAGNOSIS — E11.9 TYPE 2 DIABETES MELLITUS WITHOUT COMPLICATION, UNSPECIFIED WHETHER LONG TERM INSULIN USE (HCC): ICD-10-CM

## 2021-01-14 PROBLEM — L97.519 ULCER OF RIGHT FOOT (HCC): Status: ACTIVE | Noted: 2021-01-14

## 2021-02-26 ENCOUNTER — HOSPITAL ENCOUNTER (OUTPATIENT)
Dept: CT IMAGING | Age: 66
Discharge: HOME | End: 2021-02-26
Attending: INTERNAL MEDICINE
Payer: MEDICARE

## 2021-02-26 DIAGNOSIS — E11.9 TYPE 2 DIABETES MELLITUS WITHOUT COMPLICATION, UNSPECIFIED WHETHER LONG TERM INSULIN USE (HCC): ICD-10-CM

## 2021-02-26 DIAGNOSIS — I48.0 PAROXYSMAL ATRIAL FIBRILLATION (HCC): ICD-10-CM

## 2021-02-26 DIAGNOSIS — I10 ESSENTIAL HYPERTENSION: ICD-10-CM

## 2021-02-26 PROCEDURE — 75571 CT HRT W/O DYE W/CA TEST: CPT

## 2021-04-16 PROBLEM — R93.1 AGATSTON CORONARY ARTERY CALCIUM SCORE BETWEEN 100 AND 199: Status: ACTIVE | Noted: 2021-04-16

## 2021-11-04 PROBLEM — E78.00 PURE HYPERCHOLESTEROLEMIA: Status: ACTIVE | Noted: 2021-11-04

## 2022-03-18 PROBLEM — L97.519 ULCER OF RIGHT FOOT (HCC): Status: ACTIVE | Noted: 2021-01-14

## 2022-03-18 PROBLEM — I48.3 TYPICAL ATRIAL FLUTTER (HCC): Status: ACTIVE | Noted: 2017-01-23

## 2022-03-18 PROBLEM — R07.89 OTHER CHEST PAIN: Status: ACTIVE | Noted: 2019-02-04

## 2022-03-19 PROBLEM — R93.1 AGATSTON CORONARY ARTERY CALCIUM SCORE BETWEEN 100 AND 199: Status: ACTIVE | Noted: 2021-04-16

## 2022-03-19 PROBLEM — E78.00 PURE HYPERCHOLESTEROLEMIA: Status: ACTIVE | Noted: 2021-11-04

## 2022-03-19 PROBLEM — R01.1 MURMUR: Status: ACTIVE | Noted: 2018-10-16

## 2022-03-19 PROBLEM — F33.9 MAJOR DEPRESSIVE DISORDER, RECURRENT (HCC): Status: ACTIVE | Noted: 2017-03-13

## 2022-03-19 PROBLEM — G47.33 OSA (OBSTRUCTIVE SLEEP APNEA): Status: ACTIVE | Noted: 2020-04-21

## 2022-03-19 PROBLEM — F41.1 GENERALIZED ANXIETY DISORDER: Status: ACTIVE | Noted: 2017-06-08

## 2022-04-18 PROBLEM — G89.29 CHRONIC LOW BACK PAIN: Status: ACTIVE | Noted: 2022-04-18

## 2022-04-18 PROBLEM — M54.50 CHRONIC LOW BACK PAIN: Status: ACTIVE | Noted: 2022-04-18

## 2022-05-26 ENCOUNTER — PREP FOR PROCEDURE (OUTPATIENT)
Dept: ORTHOPEDIC SURGERY | Age: 67
End: 2022-05-26

## 2022-06-16 ENCOUNTER — HOSPITAL ENCOUNTER (OUTPATIENT)
Dept: PREADMISSION TESTING | Age: 67
Discharge: HOME | End: 2022-06-19
Payer: MEDICARE

## 2022-06-16 VITALS
HEIGHT: 67 IN | WEIGHT: 216.9 LBS | OXYGEN SATURATION: 98 % | RESPIRATION RATE: 16 BRPM | DIASTOLIC BLOOD PRESSURE: 61 MMHG | BODY MASS INDEX: 34.04 KG/M2 | HEART RATE: 53 BPM | SYSTOLIC BLOOD PRESSURE: 123 MMHG | TEMPERATURE: 97.1 F

## 2022-06-16 LAB
ANION GAP SERPL CALC-SCNC: 4 MMOL/L (ref 7–16)
APPEARANCE UR: CLEAR
BACTERIA URNS QL MICRO: 0 /HPF
BASOPHILS # BLD: 0.1 K/UL (ref 0–0.2)
BASOPHILS NFR BLD: 1 % (ref 0–2)
BILIRUB UR QL: NEGATIVE
BUN SERPL-MCNC: 36 MG/DL (ref 8–23)
CALCIUM SERPL-MCNC: 9.1 MG/DL (ref 8.3–10.4)
CASTS URNS QL MICRO: ABNORMAL /LPF
CHLORIDE SERPL-SCNC: 110 MMOL/L (ref 98–107)
CO2 SERPL-SCNC: 28 MMOL/L (ref 21–32)
COLOR UR: YELLOW
CREAT SERPL-MCNC: 0.93 MG/DL (ref 0.6–1)
DIFFERENTIAL METHOD BLD: ABNORMAL
EOSINOPHIL # BLD: 0.3 K/UL (ref 0–0.8)
EOSINOPHIL NFR BLD: 3 % (ref 0.5–7.8)
EPI CELLS #/AREA URNS HPF: ABNORMAL /HPF
ERYTHROCYTE [DISTWIDTH] IN BLOOD BY AUTOMATED COUNT: 14.3 % (ref 11.9–14.6)
EST. AVERAGE GLUCOSE BLD GHB EST-MCNC: 148 MG/DL
GLUCOSE BLD STRIP.AUTO-MCNC: 79 MG/DL (ref 65–100)
GLUCOSE SERPL-MCNC: 77 MG/DL (ref 65–100)
GLUCOSE UR STRIP.AUTO-MCNC: NEGATIVE MG/DL
HBA1C MFR BLD: 6.8 % (ref 4.2–6.3)
HCT VFR BLD AUTO: 35.9 % (ref 35.8–46.3)
HGB BLD-MCNC: 11.4 G/DL (ref 11.7–15.4)
HGB UR QL STRIP: ABNORMAL
IMM GRANULOCYTES # BLD AUTO: 0 K/UL (ref 0–0.5)
IMM GRANULOCYTES NFR BLD AUTO: 1 % (ref 0–5)
KETONES UR QL STRIP.AUTO: NEGATIVE MG/DL
LEUKOCYTE ESTERASE UR QL STRIP.AUTO: NEGATIVE
LYMPHOCYTES # BLD: 3 K/UL (ref 0.5–4.6)
LYMPHOCYTES NFR BLD: 35 % (ref 13–44)
MCH RBC QN AUTO: 29.5 PG (ref 26.1–32.9)
MCHC RBC AUTO-ENTMCNC: 31.8 G/DL (ref 31.4–35)
MCV RBC AUTO: 93 FL (ref 79.6–97.8)
MONOCYTES # BLD: 0.6 K/UL (ref 0.1–1.3)
MONOCYTES NFR BLD: 8 % (ref 4–12)
NEUTS SEG # BLD: 4.4 K/UL (ref 1.7–8.2)
NEUTS SEG NFR BLD: 52 % (ref 43–78)
NITRITE UR QL STRIP.AUTO: NEGATIVE
NRBC # BLD: 0 K/UL (ref 0–0.2)
PH UR STRIP: 6.5 [PH] (ref 5–9)
PLATELET # BLD AUTO: 222 K/UL (ref 150–450)
PMV BLD AUTO: 10.8 FL (ref 9.4–12.3)
POTASSIUM SERPL-SCNC: 4.2 MMOL/L (ref 3.5–5.1)
PROT UR STRIP-MCNC: NEGATIVE MG/DL
RBC # BLD AUTO: 3.86 M/UL (ref 4.05–5.2)
RBC #/AREA URNS HPF: ABNORMAL /HPF
SERVICE CMNT-IMP: NORMAL
SODIUM SERPL-SCNC: 142 MMOL/L (ref 136–145)
SP GR UR REFRACTOMETRY: 1.03 (ref 1–1.02)
UROBILINOGEN UR QL STRIP.AUTO: 1 EU/DL (ref 0.2–1)
WBC # BLD AUTO: 8.3 K/UL (ref 4.3–11.1)
WBC URNS QL MICRO: ABNORMAL /HPF

## 2022-06-16 PROCEDURE — 82962 GLUCOSE BLOOD TEST: CPT

## 2022-06-16 PROCEDURE — 85025 COMPLETE CBC W/AUTO DIFF WBC: CPT

## 2022-06-16 PROCEDURE — 83036 HEMOGLOBIN GLYCOSYLATED A1C: CPT

## 2022-06-16 PROCEDURE — 81001 URINALYSIS AUTO W/SCOPE: CPT

## 2022-06-16 PROCEDURE — 80048 BASIC METABOLIC PNL TOTAL CA: CPT

## 2022-06-16 RX ORDER — CARVEDILOL 6.25 MG/1
6.25 TABLET ORAL AS NEEDED
Status: ON HOLD | COMMUNITY
End: 2022-11-02 | Stop reason: HOSPADM

## 2022-06-16 RX ORDER — OXYCODONE HCL 10 MG/1
10 TABLET, FILM COATED, EXTENDED RELEASE ORAL AS NEEDED
Status: ON HOLD | COMMUNITY
End: 2022-06-23 | Stop reason: HOSPADM

## 2022-06-16 RX ORDER — GABAPENTIN 300 MG/1
300 CAPSULE ORAL 3 TIMES DAILY
COMMUNITY

## 2022-06-16 RX ORDER — DULOXETIN HYDROCHLORIDE 60 MG/1
60 CAPSULE, DELAYED RELEASE ORAL DAILY
COMMUNITY

## 2022-06-16 NOTE — PERIOP NOTE
Patient verified name, , and surgery as listed in [facility]. Patient provided medical/health information and PTA medications to the best of their ability. TYPE  CASE: 2  Orders per surgeon: received  Labs per surgeon: CBC w/ diff, BMP, UA, A1C. Results: pending  Labs per anesthesia protocol: POC Glucose. Results 78 (pt asked if she feels okay, per pt \"I feel fine\")  [id number]    Per pt, Dr. Torito Layton told her to stop taking Aspirin 81 mg 7 days prior to surgery. Notified Dr. Petrona Saeed of pt heart history, possible TIA in the past and medications taking. Per Dr. Petrona Saeed, he is okay with patient stopping Aspirin for surgery. Mupriocin ointment called into pt's preferred pharmacy, see note if needed. Patient provided with and instructed on education handouts including Guide to Surgery, blood transfusions, pain management, and hand hygiene for the family and community, and [facility] brochure. Road to Recovery Spine surgery patient guide given. Instructed on incentive spirometry. Hibiclens and instructions given per hospital policy. Original medication prescription bottles WERE visualized during patient appointment. Patient teach back successful and patient demonstrates knowledge of instruction. How to Use Your Incentive Spirometer       About Your Incentive Spirometer  An incentive spirometer is a device that will expand your lungs by helping you to breathe more deeply and fully. The parts of your incentive spirometer are labeled in Figure 1. Using your incentive spirometer  When you're using your incentive spirometer, make sure to breathe through your mouth. If you breathe through your nose, the incentive spirometer won't work properly. You can hold your nose if you have trouble. DO NOT BLOW INTO THE DEVICE. If you feel dizzy at any time, stop and rest. Try again at a later time.  Sit upright in a chair or in bed.  Hold the incentive spirometer at eye level.    Put the mouthpiece in your mouth and close your lips tightly around it. Slowly breathe out (exhale) completely.  Breathe in (inhale) slowly through your mouth as deeply as you can. As you take the breath, you will see the piston rise inside the large column. While the piston rises, the indicator on the right should move upwards. It should stay in between the 2 arrows (see Figure 1).  Try to get the piston as high as you can, while keeping the indicator between the arrows. If the indicator doesn't stay between the arrows, you're breathing either too fast or too slow.  When you get it as high as you can, hold your breath for 10 seconds, or as long as possible. While you're holding your breath, the piston will slowly fall to the base of the spirometer.  Once the piston reaches the bottom of the spirometer, breathe out slowly through your mouth. Rest for a few seconds.  Repeat 10 times. Try to get the piston to the same level with each breath.  After each set of 10 breaths, try to cough as coughing will help loosen or clear any mucus in your lungs.  Put the marker at the level the piston reached on your incentive spirometer. This will be your goal next time. Repeat these steps every hour that you're awake. Cover the mouthpiece of the incentive spirometer when you aren't using it    PLEASE CONTINUE TAKING ALL PRESCRIPTION MEDICATIONS UP TO THE DAY OF SURGERY UNLESS OTHERWISE DIRECTED BELOW. DISCONTINUE all vitamins and supplements 7 days prior to surgery. DISCONTINUE Non-Steriodal Anti-Inflammatory (NSAIDS) such as Advil and Aleve 5 days prior to surgery. Home Medications to take  the day of surgery   Duloxetine   Oxycodone if needed   Levothyroxine   Insulin NPH: take 5 units the morning of surgery if blood sugar is >120; hold if blood sugar is <120.     Loratadine   Gabapentin   Estradiol    Hydralazine   Topiramate   Carvedilol if needed   Omeprazole      Home Medications   to Hold Celebrex      Aspirin 81 mg      Comments    Hibiclens shower the night before surgery and the morning of surgery. On the day before surgery please take Acetaminophen 1000mg in the morning and then again before bed. You may substitute for Tylenol 650 mg.      Bring book and incentive spirometer the day of surgery. Insulin NPH: take 8 units in the am on Wednesday 06/22/2022. Please do not bring home medications with you on the day of surgery unless otherwise directed by your nurse. If you are instructed to bring home medications, please give them to your nurse as they will be administered by the nursing staff. If you have any questions, please call [address] [place] [address] [phone] or [facility] [phone]. A copy of this note was provided to the patient for reference.

## 2022-06-16 NOTE — PERIOP NOTE
Results for Maribeth Raoms (MRN [id number]) as of 6/16/2022 14:26   Ref.  Range 6/16/2022 12:23   Sodium Latest Ref Range: 136 - 145 mmol/L 142   Potassium Latest Ref Range: 3.5 - 5.1 mmol/L 4.2   Chloride Latest Ref Range: 98 - 107 mmol/L 110 (H)   CO2 Latest Ref Range: 21 - 32 mmol/L 28   BUN,BUNPL Latest Ref Range: 8 - 23 MG/DL 36 (H)   Creatinine Latest Ref Range: 0.6 - 1.0 MG/DL 0.93   Anion Gap Latest Ref Range: 7 - 16 mmol/L 4 (L)   GFR Non- Latest Ref Range: >60 ml/min/1.73m2 >60   GFR African American Latest Ref Range: >60 ml/min/1.73m2 >60   GLUCOSE, FASTING,GF Latest Ref Range: 65 - 100 mg/dL 77   CALCIUM, SERUM, [id number] Latest Ref Range: 8.3 - 10.4 MG/DL 9.1   WBC Latest Ref Range: 4.3 - 11.1 K/uL 8.3   RBC Latest Ref Range: 4.05 - 5.2 M/uL 3.86 (L)   Hemoglobin Quant Latest Ref Range: 11.7 - 15.4 g/dL 11.4 (L)   Hematocrit Latest Ref Range: 35.8 - 46.3 % 35.9   MCV Latest Ref Range: 79.6 - 97.8 FL 93.0   MCH Latest Ref Range: 26.1 - 32.9 PG 29.5   MCHC Latest Ref Range: 31.4 - 35.0 g/dL 31.8   MPV Latest Ref Range: 9.4 - 12.3 FL 10.8   RDW Latest Ref Range: 11.9 - 14.6 % 14.3   Platelet Count Latest Ref Range: 150 - 450 K/uL 222   Absolute Mono # Latest Ref Range: 0.1 - 1.3 K/UL 0.6   Eosinophils % Latest Ref Range: 0.5 - 7.8 % 3   Basophils Absolute Latest Ref Range: 0.0 - 0.2 K/UL 0.1   Differential Type Latest Units:   AUTOMATED   Seg Neutrophils Latest Ref Range: 43 - 78 % 52   Segs Absolute Latest Ref Range: 1.7 - 8.2 K/UL 4.4   Lymphocytes Latest Ref Range: 13 - 44 % 35   Absolute Lymph # Latest Ref Range: 0.5 - 4.6 K/UL 3.0   Monocytes Latest Ref Range: 4.0 - 12.0 % 8   Absolute Eos # Latest Ref Range: 0.0 - 0.8 K/UL 0.3   Basophils Latest Ref Range: 0.0 - 2.0 % 1   Immature Granulocytes Latest Ref Range: 0.0 - 5.0 % 1   Nucleated Red Blood Cells Latest Ref Range: 0.0 - 0.2 K/uL 0.00   Color, UA Latest Units:   YELLOW   Glucose, UA Latest Units: mg/dL Negative   Bilirubin,

## 2022-06-16 NOTE — PERIOP NOTE
Mupirocin 2% ointment    Prevention of Infection after surgery is a goal of your care at [address] [place] [address]. Infection prevention is a team effort between you, your doctor, and the staff at [address] [place] [address]. We can help prevent infection after surgery by good hygiene, hand washing and use of mupirocin ointment. You will need to use Mupirocin ointment in your nose twice daily for five days, please carefully read the instructions below and follow ALL of them exactly. How to use Mupirocin ointment:   This medicine has been approved for use in the nose. You do not need to use it anywhere else.  Do not get any of it in your eyes or on your skin. If it does get on these areas, rinse it off right away.  Before using the medicine, gently blow your nose to clear the nostrils. Apply inside each nostril with a cotton tipped applicator.  This medicine is not for long term use.  Make sure your doctor knows if you are pregnant or breast feeding.  This medicine should not be used in children under 15years old, unless prescribed by your doctor. Mupirocin 2% ointment: Apply to each nostril, twice daily: once in the morning and once in the evening, for five days. If you have any questions, please call the Preassessment Department where you had your appointment. Please call between the hours of 8:30 am and 4:30 pm, Monday through Friday, excluding Holidays. Thuy Gonzalez [phone], [address] [place] [address] [phone]. Please check off on the chart below each and every time that you use the ointment. All doses of the medication need to be to completed the night before your surgery date. Start date: ____06/18_________     Day #     Date Morning dose Afternoon dose    One 06/18     Two 06/19     Three 06/20     Four 06/21     Five 06/22          It is important to bring this completed sheet to the hospital the day of surgery. Give it to the nurse who gets you ready for surgery.    If you forget to bring the sheet, the 10 doses will be started over   DO NOT bring the tube of Mupirocin with you. If you have not completed all 10 doses, it will be   given to you by your nurses in the hospital    Thank you! FREQUENTLY ASKED QUESTIONS:    What is Staph and MRSA? Staphylococcus aureus or Staph germs are very common. About 1 out of every 3 people (about 30%) carries Staph on their skin or in their nose. Methicillin-resistant Staphylococcus aureus or MRSA germs are a type of Staph germ that are very resistant to antibiotics. About 2 out of every 100 people (about 2%) carry MRSA on their skin or in their nose. Does this mean I have a Staph or MRSA infection in my nose? Being colonized or a carrier means you carry the germ but have no active signs or symptoms of infection. You are not sick with a MRSA or Staph infection. How did I get Staph or MRSA? Anyone can get Staph or MRSA on their body by either having contact with an infected wound or sharing personal items that have touched infected skin. People at higher risk for contacting MRSA or Staph are those in close contact with shared equipment or supplies. For example, athletes,  and school students, nursing home residents and those receiving inpatient medical care are at higher risk. Can my family or friends get Staph or MRSA from me? The chance of family or friends getting Staph or MRSA from you is very low. Bathing frequently, frequent hand washing and not sharing personal items like towels or razors will help prevent spreading Staph or MRSA to others. Your family or friends should wash hands with soap and water or use antibacterial gel before and after visiting with you. As long as family and friends, including children, are healthyit is okay to visit with your loved ones. Will this cancel my surgery? No, being colonized with Staph or MRSA will not cancel your surgery.  However, an infection can start if Staph or MRSA germs enter a break in the skin such as a surgical site. By using the Mupirocin ointment if your swab is positive, frequent hand washing and practicing good hygiene like bathing before your surgery, we can work together to help prevent infection after your surgery.

## 2022-06-16 NOTE — PERIOP NOTE
Prescription for Mupirocin ointment 22g tube, apply intranasally to both nostrils BID x5 days pre-operatively or for a total of 10 doses; called to [address] [place], [place].

## 2022-06-22 ENCOUNTER — ANESTHESIA EVENT (OUTPATIENT)
Dept: SURGERY | Age: 67
End: 2022-06-22
Payer: MEDICARE

## 2022-06-22 NOTE — PERIOP NOTE
Directly informed patient and or family member of pre op arrival time 36 on Thursday. All questions answered. Pre op instructions reviewed. Left contact information for any additional questions or needs.

## 2022-06-23 ENCOUNTER — APPOINTMENT (OUTPATIENT)
Dept: GENERAL RADIOLOGY | Age: 67
End: 2022-06-23
Attending: ORTHOPAEDIC SURGERY
Payer: MEDICARE

## 2022-06-23 ENCOUNTER — ANESTHESIA (OUTPATIENT)
Dept: SURGERY | Age: 67
End: 2022-06-23
Payer: MEDICARE

## 2022-06-23 ENCOUNTER — HOSPITAL ENCOUNTER (OUTPATIENT)
Age: 67
Setting detail: OBSERVATION
Discharge: HOME | End: 2022-06-24
Attending: ORTHOPAEDIC SURGERY | Admitting: ORTHOPAEDIC SURGERY
Payer: MEDICARE

## 2022-06-23 DIAGNOSIS — G89.4 CHRONIC PAIN SYNDROME: Primary | ICD-10-CM

## 2022-06-23 LAB
GLUCOSE BLD STRIP.AUTO-MCNC: 112 MG/DL (ref 65–100)
GLUCOSE BLD STRIP.AUTO-MCNC: 124 MG/DL (ref 65–100)
GLUCOSE BLD STRIP.AUTO-MCNC: 136 MG/DL (ref 65–100)
GLUCOSE BLD STRIP.AUTO-MCNC: 252 MG/DL (ref 65–100)
POTASSIUM BLD-SCNC: 3.4 MMOL/L (ref 3.5–5.1)
SERVICE CMNT-IMP: ABNORMAL

## 2022-06-23 PROCEDURE — 3700000000 HC ANESTHESIA ATTENDED CARE: Performed by: ORTHOPAEDIC SURGERY

## 2022-06-23 PROCEDURE — 63655 IMPLANT NEUROELECTRODES: CPT | Performed by: ORTHOPAEDIC SURGERY

## 2022-06-23 PROCEDURE — 2580000003 HC RX 258: Performed by: STUDENT IN AN ORGANIZED HEALTH CARE EDUCATION/TRAINING PROGRAM

## 2022-06-23 PROCEDURE — 6360000002 HC RX W HCPCS: Performed by: STUDENT IN AN ORGANIZED HEALTH CARE EDUCATION/TRAINING PROGRAM

## 2022-06-23 PROCEDURE — 2720000010 HC SURG SUPPLY STERILE: Performed by: ORTHOPAEDIC SURGERY

## 2022-06-23 PROCEDURE — C1778 LEAD, NEUROSTIMULATOR: HCPCS | Performed by: ORTHOPAEDIC SURGERY

## 2022-06-23 PROCEDURE — 6370000000 HC RX 637 (ALT 250 FOR IP): Performed by: STUDENT IN AN ORGANIZED HEALTH CARE EDUCATION/TRAINING PROGRAM

## 2022-06-23 PROCEDURE — 3600000003 HC SURGERY LEVEL 3 BASE: Performed by: ORTHOPAEDIC SURGERY

## 2022-06-23 PROCEDURE — 2580000003 HC RX 258: Performed by: ORTHOPAEDIC SURGERY

## 2022-06-23 PROCEDURE — 63685 INS/RPLC SPI NPG/RCVR POCKET: CPT | Performed by: ORTHOPAEDIC SURGERY

## 2022-06-23 PROCEDURE — G0378 HOSPITAL OBSERVATION PER HR: HCPCS

## 2022-06-23 PROCEDURE — 6360000002 HC RX W HCPCS: Performed by: NURSE ANESTHETIST, CERTIFIED REGISTERED

## 2022-06-23 PROCEDURE — 7100000001 HC PACU RECOVERY - ADDTL 15 MIN: Performed by: ORTHOPAEDIC SURGERY

## 2022-06-23 PROCEDURE — 6370000000 HC RX 637 (ALT 250 FOR IP): Performed by: ORTHOPAEDIC SURGERY

## 2022-06-23 PROCEDURE — 2500000003 HC RX 250 WO HCPCS: Performed by: NURSE ANESTHETIST, CERTIFIED REGISTERED

## 2022-06-23 PROCEDURE — 7100000000 HC PACU RECOVERY - FIRST 15 MIN: Performed by: ORTHOPAEDIC SURGERY

## 2022-06-23 PROCEDURE — 2709999900 HC NON-CHARGEABLE SUPPLY: Performed by: ORTHOPAEDIC SURGERY

## 2022-06-23 PROCEDURE — C1767 GENERATOR, NEURO NON-RECHARG: HCPCS | Performed by: ORTHOPAEDIC SURGERY

## 2022-06-23 PROCEDURE — 3700000001 HC ADD 15 MINUTES (ANESTHESIA): Performed by: ORTHOPAEDIC SURGERY

## 2022-06-23 PROCEDURE — A4217 STERILE WATER/SALINE, 500 ML: HCPCS | Performed by: ORTHOPAEDIC SURGERY

## 2022-06-23 PROCEDURE — 82962 GLUCOSE BLOOD TEST: CPT

## 2022-06-23 PROCEDURE — 6360000002 HC RX W HCPCS: Performed by: ORTHOPAEDIC SURGERY

## 2022-06-23 PROCEDURE — 84132 ASSAY OF SERUM POTASSIUM: CPT

## 2022-06-23 PROCEDURE — 2500000003 HC RX 250 WO HCPCS: Performed by: ORTHOPAEDIC SURGERY

## 2022-06-23 PROCEDURE — 3600000013 HC SURGERY LEVEL 3 ADDTL 15MIN: Performed by: ORTHOPAEDIC SURGERY

## 2022-06-23 PROCEDURE — 72020 X-RAY EXAM OF SPINE 1 VIEW: CPT

## 2022-06-23 DEVICE — 3MM LEAD, 60 CM
Type: IMPLANTABLE DEVICE | Site: SPINE THORACIC | Status: FUNCTIONAL
Brand: PENTA™

## 2022-06-23 DEVICE — IMPLANTABLE PULSE GENERATOR
Type: IMPLANTABLE DEVICE | Status: FUNCTIONAL
Brand: PROCLAIM™

## 2022-06-23 RX ORDER — EPHEDRINE SULFATE/0.9% NACL/PF 50 MG/5 ML
SYRINGE (ML) INTRAVENOUS PRN
Status: DISCONTINUED | OUTPATIENT
Start: 2022-06-23 | End: 2022-06-23 | Stop reason: SDUPTHER

## 2022-06-23 RX ORDER — CETIRIZINE HYDROCHLORIDE 10 MG/1
10 TABLET ORAL DAILY
Status: DISCONTINUED | OUTPATIENT
Start: 2022-06-24 | End: 2022-06-24 | Stop reason: HOSPADM

## 2022-06-23 RX ORDER — PANTOPRAZOLE SODIUM 40 MG/1
40 TABLET, DELAYED RELEASE ORAL
Status: DISCONTINUED | OUTPATIENT
Start: 2022-06-24 | End: 2022-06-24 | Stop reason: HOSPADM

## 2022-06-23 RX ORDER — LABETALOL HYDROCHLORIDE 5 MG/ML
10 INJECTION, SOLUTION INTRAVENOUS
Status: DISCONTINUED | OUTPATIENT
Start: 2022-06-23 | End: 2022-06-23 | Stop reason: HOSPADM

## 2022-06-23 RX ORDER — APREPITANT 40 MG/1
40 CAPSULE ORAL ONCE
Status: COMPLETED | OUTPATIENT
Start: 2022-06-23 | End: 2022-06-23

## 2022-06-23 RX ORDER — SODIUM CHLORIDE, SODIUM LACTATE, POTASSIUM CHLORIDE, CALCIUM CHLORIDE 600; 310; 30; 20 MG/100ML; MG/100ML; MG/100ML; MG/100ML
INJECTION, SOLUTION INTRAVENOUS CONTINUOUS
Status: DISCONTINUED | OUTPATIENT
Start: 2022-06-23 | End: 2022-06-24 | Stop reason: HOSPADM

## 2022-06-23 RX ORDER — HYDROMORPHONE HYDROCHLORIDE 1 MG/ML
0.5 INJECTION, SOLUTION INTRAMUSCULAR; INTRAVENOUS; SUBCUTANEOUS
Status: DISCONTINUED | OUTPATIENT
Start: 2022-06-23 | End: 2022-06-24 | Stop reason: HOSPADM

## 2022-06-23 RX ORDER — INSULIN LISPRO 100 [IU]/ML
0-4 INJECTION, SOLUTION INTRAVENOUS; SUBCUTANEOUS
Status: DISCONTINUED | OUTPATIENT
Start: 2022-06-23 | End: 2022-06-24 | Stop reason: HOSPADM

## 2022-06-23 RX ORDER — OXYCODONE HYDROCHLORIDE 5 MG/1
10 TABLET ORAL PRN
Status: COMPLETED | OUTPATIENT
Start: 2022-06-23 | End: 2022-06-23

## 2022-06-23 RX ORDER — ATROPINE SULFATE 0.4 MG/ML
AMPUL (ML) INJECTION PRN
Status: DISCONTINUED | OUTPATIENT
Start: 2022-06-23 | End: 2022-06-23 | Stop reason: SDUPTHER

## 2022-06-23 RX ORDER — ACETAMINOPHEN 500 MG
1000 TABLET ORAL ONCE
Status: COMPLETED | OUTPATIENT
Start: 2022-06-23 | End: 2022-06-23

## 2022-06-23 RX ORDER — DULOXETIN HYDROCHLORIDE 60 MG/1
60 CAPSULE, DELAYED RELEASE ORAL DAILY
Status: DISCONTINUED | OUTPATIENT
Start: 2022-06-24 | End: 2022-06-24 | Stop reason: HOSPADM

## 2022-06-23 RX ORDER — GABAPENTIN 300 MG/1
300 CAPSULE ORAL 3 TIMES DAILY
Status: DISCONTINUED | OUTPATIENT
Start: 2022-06-23 | End: 2022-06-24 | Stop reason: HOSPADM

## 2022-06-23 RX ORDER — SODIUM CHLORIDE 0.9 % (FLUSH) 0.9 %
5-40 SYRINGE (ML) INJECTION PRN
Status: DISCONTINUED | OUTPATIENT
Start: 2022-06-23 | End: 2022-06-24 | Stop reason: HOSPADM

## 2022-06-23 RX ORDER — LIDOCAINE HYDROCHLORIDE 20 MG/ML
INJECTION, SOLUTION EPIDURAL; INFILTRATION; INTRACAUDAL; PERINEURAL PRN
Status: DISCONTINUED | OUTPATIENT
Start: 2022-06-23 | End: 2022-06-23 | Stop reason: SDUPTHER

## 2022-06-23 RX ORDER — ESTRADIOL 1 MG/1
0.5 TABLET ORAL DAILY
Status: DISCONTINUED | OUTPATIENT
Start: 2022-06-24 | End: 2022-06-24 | Stop reason: HOSPADM

## 2022-06-23 RX ORDER — DIPHENHYDRAMINE HYDROCHLORIDE 50 MG/ML
12.5 INJECTION INTRAMUSCULAR; INTRAVENOUS
Status: DISCONTINUED | OUTPATIENT
Start: 2022-06-23 | End: 2022-06-23 | Stop reason: HOSPADM

## 2022-06-23 RX ORDER — ROCURONIUM BROMIDE 10 MG/ML
INJECTION, SOLUTION INTRAVENOUS PRN
Status: DISCONTINUED | OUTPATIENT
Start: 2022-06-23 | End: 2022-06-23 | Stop reason: SDUPTHER

## 2022-06-23 RX ORDER — GLYCOPYRROLATE 0.2 MG/ML
INJECTION INTRAMUSCULAR; INTRAVENOUS PRN
Status: DISCONTINUED | OUTPATIENT
Start: 2022-06-23 | End: 2022-06-23 | Stop reason: SDUPTHER

## 2022-06-23 RX ORDER — SODIUM CHLORIDE 9 MG/ML
INJECTION, SOLUTION INTRAVENOUS PRN
Status: DISCONTINUED | OUTPATIENT
Start: 2022-06-23 | End: 2022-06-24 | Stop reason: HOSPADM

## 2022-06-23 RX ORDER — ACETAMINOPHEN 325 MG/1
650 TABLET ORAL EVERY 6 HOURS
Status: DISCONTINUED | OUTPATIENT
Start: 2022-06-23 | End: 2022-06-24 | Stop reason: HOSPADM

## 2022-06-23 RX ORDER — HYDROMORPHONE HYDROCHLORIDE 2 MG/ML
0.5 INJECTION, SOLUTION INTRAMUSCULAR; INTRAVENOUS; SUBCUTANEOUS EVERY 5 MIN PRN
Status: DISCONTINUED | OUTPATIENT
Start: 2022-06-23 | End: 2022-06-23 | Stop reason: HOSPADM

## 2022-06-23 RX ORDER — POLYETHYLENE GLYCOL 3350 17 G/17G
17 POWDER, FOR SOLUTION ORAL DAILY
Status: DISCONTINUED | OUTPATIENT
Start: 2022-06-23 | End: 2022-06-24 | Stop reason: HOSPADM

## 2022-06-23 RX ORDER — HALOPERIDOL 5 MG/ML
1 INJECTION INTRAMUSCULAR
Status: DISCONTINUED | OUTPATIENT
Start: 2022-06-23 | End: 2022-06-23 | Stop reason: HOSPADM

## 2022-06-23 RX ORDER — DEXAMETHASONE SODIUM PHOSPHATE 10 MG/ML
INJECTION INTRAMUSCULAR; INTRAVENOUS PRN
Status: DISCONTINUED | OUTPATIENT
Start: 2022-06-23 | End: 2022-06-23 | Stop reason: SDUPTHER

## 2022-06-23 RX ORDER — INSULIN LISPRO 100 [IU]/ML
0-4 INJECTION, SOLUTION INTRAVENOUS; SUBCUTANEOUS NIGHTLY
Status: DISCONTINUED | OUTPATIENT
Start: 2022-06-23 | End: 2022-06-24 | Stop reason: HOSPADM

## 2022-06-23 RX ORDER — ONDANSETRON 4 MG/1
4 TABLET, ORALLY DISINTEGRATING ORAL EVERY 6 HOURS PRN
Status: DISCONTINUED | OUTPATIENT
Start: 2022-06-23 | End: 2022-06-24 | Stop reason: HOSPADM

## 2022-06-23 RX ORDER — IPRATROPIUM BROMIDE AND ALBUTEROL SULFATE 2.5; .5 MG/3ML; MG/3ML
1 SOLUTION RESPIRATORY (INHALATION)
Status: DISCONTINUED | OUTPATIENT
Start: 2022-06-23 | End: 2022-06-23 | Stop reason: HOSPADM

## 2022-06-23 RX ORDER — LANOLIN ALCOHOL/MO/W.PET/CERES
400 CREAM (GRAM) TOPICAL 2 TIMES DAILY
Status: DISCONTINUED | OUTPATIENT
Start: 2022-06-23 | End: 2022-06-24 | Stop reason: HOSPADM

## 2022-06-23 RX ORDER — PROCHLORPERAZINE EDISYLATE 5 MG/ML
5 INJECTION INTRAMUSCULAR; INTRAVENOUS
Status: DISCONTINUED | OUTPATIENT
Start: 2022-06-23 | End: 2022-06-23 | Stop reason: HOSPADM

## 2022-06-23 RX ORDER — CARVEDILOL 12.5 MG/1
12.5 TABLET ORAL 2 TIMES DAILY
Status: DISCONTINUED | OUTPATIENT
Start: 2022-06-23 | End: 2022-06-24 | Stop reason: HOSPADM

## 2022-06-23 RX ORDER — TOPIRAMATE 100 MG/1
100 TABLET, FILM COATED ORAL 2 TIMES DAILY
Status: DISCONTINUED | OUTPATIENT
Start: 2022-06-23 | End: 2022-06-24 | Stop reason: HOSPADM

## 2022-06-23 RX ORDER — SODIUM CHLORIDE, SODIUM LACTATE, POTASSIUM CHLORIDE, CALCIUM CHLORIDE 600; 310; 30; 20 MG/100ML; MG/100ML; MG/100ML; MG/100ML
INJECTION, SOLUTION INTRAVENOUS CONTINUOUS
Status: DISCONTINUED | OUTPATIENT
Start: 2022-06-23 | End: 2022-06-23 | Stop reason: HOSPADM

## 2022-06-23 RX ORDER — PROPOFOL 10 MG/ML
INJECTION, EMULSION INTRAVENOUS PRN
Status: DISCONTINUED | OUTPATIENT
Start: 2022-06-23 | End: 2022-06-23 | Stop reason: SDUPTHER

## 2022-06-23 RX ORDER — LIDOCAINE HYDROCHLORIDE 10 MG/ML
1 INJECTION, SOLUTION EPIDURAL; INFILTRATION; INTRACAUDAL; PERINEURAL
Status: DISCONTINUED | OUTPATIENT
Start: 2022-06-23 | End: 2022-06-23 | Stop reason: HOSPADM

## 2022-06-23 RX ORDER — TIZANIDINE 2 MG/1
4 TABLET ORAL EVERY 6 HOURS PRN
Status: DISCONTINUED | OUTPATIENT
Start: 2022-06-23 | End: 2022-06-24 | Stop reason: HOSPADM

## 2022-06-23 RX ORDER — TRAZODONE HYDROCHLORIDE 50 MG/1
50 TABLET ORAL NIGHTLY
Status: DISCONTINUED | OUTPATIENT
Start: 2022-06-23 | End: 2022-06-24 | Stop reason: HOSPADM

## 2022-06-23 RX ORDER — OXYCODONE HYDROCHLORIDE 5 MG/1
5 TABLET ORAL PRN
Status: COMPLETED | OUTPATIENT
Start: 2022-06-23 | End: 2022-06-23

## 2022-06-23 RX ORDER — DULOXETIN HYDROCHLORIDE 30 MG/1
30 CAPSULE, DELAYED RELEASE ORAL NIGHTLY
Status: DISCONTINUED | OUTPATIENT
Start: 2022-06-23 | End: 2022-06-24 | Stop reason: HOSPADM

## 2022-06-23 RX ORDER — VANCOMYCIN HYDROCHLORIDE 1 G/20ML
INJECTION, POWDER, LYOPHILIZED, FOR SOLUTION INTRAVENOUS PRN
Status: DISCONTINUED | OUTPATIENT
Start: 2022-06-23 | End: 2022-06-23 | Stop reason: ALTCHOICE

## 2022-06-23 RX ORDER — LEVOTHYROXINE SODIUM 0.12 MG/1
125 TABLET ORAL
Status: DISCONTINUED | OUTPATIENT
Start: 2022-06-24 | End: 2022-06-24 | Stop reason: HOSPADM

## 2022-06-23 RX ORDER — HYDROMORPHONE HYDROCHLORIDE 2 MG/1
2 TABLET ORAL EVERY 4 HOURS PRN
Qty: 15 TABLET | Refills: 0 | Status: SHIPPED | OUTPATIENT
Start: 2022-06-23 | End: 2022-06-26

## 2022-06-23 RX ORDER — EZETIMIBE 10 MG/1
10 TABLET ORAL DAILY
Status: DISCONTINUED | OUTPATIENT
Start: 2022-06-23 | End: 2022-06-24 | Stop reason: HOSPADM

## 2022-06-23 RX ORDER — KETOROLAC TROMETHAMINE 15 MG/ML
15 INJECTION, SOLUTION INTRAMUSCULAR; INTRAVENOUS EVERY 6 HOURS
Status: COMPLETED | OUTPATIENT
Start: 2022-06-23 | End: 2022-06-24

## 2022-06-23 RX ORDER — NEOSTIGMINE METHYLSULFATE 1 MG/ML
INJECTION, SOLUTION INTRAVENOUS PRN
Status: DISCONTINUED | OUTPATIENT
Start: 2022-06-23 | End: 2022-06-23 | Stop reason: SDUPTHER

## 2022-06-23 RX ORDER — CELECOXIB 100 MG/1
200 CAPSULE ORAL 2 TIMES DAILY
Status: DISCONTINUED | OUTPATIENT
Start: 2022-06-23 | End: 2022-06-24 | Stop reason: HOSPADM

## 2022-06-23 RX ORDER — ONDANSETRON 2 MG/ML
INJECTION INTRAMUSCULAR; INTRAVENOUS PRN
Status: DISCONTINUED | OUTPATIENT
Start: 2022-06-23 | End: 2022-06-23 | Stop reason: SDUPTHER

## 2022-06-23 RX ORDER — OXYCODONE HYDROCHLORIDE 5 MG/1
10 TABLET ORAL EVERY 4 HOURS PRN
Status: DISCONTINUED | OUTPATIENT
Start: 2022-06-23 | End: 2022-06-24 | Stop reason: HOSPADM

## 2022-06-23 RX ORDER — SODIUM CHLORIDE 0.9 % (FLUSH) 0.9 %
5-40 SYRINGE (ML) INJECTION EVERY 12 HOURS SCHEDULED
Status: DISCONTINUED | OUTPATIENT
Start: 2022-06-23 | End: 2022-06-24 | Stop reason: HOSPADM

## 2022-06-23 RX ORDER — LISINOPRIL 20 MG/1
40 TABLET ORAL NIGHTLY
Status: DISCONTINUED | OUTPATIENT
Start: 2022-06-23 | End: 2022-06-24 | Stop reason: HOSPADM

## 2022-06-23 RX ORDER — HYDRALAZINE HYDROCHLORIDE 50 MG/1
25 TABLET, FILM COATED ORAL EVERY 6 HOURS
Status: DISCONTINUED | OUTPATIENT
Start: 2022-06-23 | End: 2022-06-24 | Stop reason: HOSPADM

## 2022-06-23 RX ORDER — HYDRALAZINE HYDROCHLORIDE 20 MG/ML
10 INJECTION INTRAMUSCULAR; INTRAVENOUS
Status: DISCONTINUED | OUTPATIENT
Start: 2022-06-23 | End: 2022-06-23 | Stop reason: HOSPADM

## 2022-06-23 RX ADMIN — ACETAMINOPHEN 650 MG: 325 TABLET ORAL at 22:33

## 2022-06-23 RX ADMIN — OXYCODONE 10 MG: 5 TABLET ORAL at 14:53

## 2022-06-23 RX ADMIN — HYDROMORPHONE HYDROCHLORIDE 0.5 MG: 1 INJECTION, SOLUTION INTRAMUSCULAR; INTRAVENOUS; SUBCUTANEOUS at 19:22

## 2022-06-23 RX ADMIN — FENTANYL CITRATE 100 MCG: 50 INJECTION INTRAMUSCULAR; INTRAVENOUS at 11:41

## 2022-06-23 RX ADMIN — ONDANSETRON 4 MG: 2 INJECTION INTRAMUSCULAR; INTRAVENOUS at 13:05

## 2022-06-23 RX ADMIN — PROPOFOL 150 MG: 10 INJECTION, EMULSION INTRAVENOUS at 11:41

## 2022-06-23 RX ADMIN — DULOXETINE HYDROCHLORIDE 30 MG: 30 CAPSULE, DELAYED RELEASE ORAL at 21:29

## 2022-06-23 RX ADMIN — HYDROMORPHONE HYDROCHLORIDE 0.5 MG: 2 INJECTION INTRAMUSCULAR; INTRAVENOUS; SUBCUTANEOUS at 14:00

## 2022-06-23 RX ADMIN — Medication 3 AMPULE: at 09:31

## 2022-06-23 RX ADMIN — POLYETHYLENE GLYCOL 3350 17 G: 17 POWDER, FOR SOLUTION ORAL at 18:10

## 2022-06-23 RX ADMIN — KETOROLAC TROMETHAMINE 15 MG: 15 INJECTION, SOLUTION INTRAMUSCULAR; INTRAVENOUS at 22:33

## 2022-06-23 RX ADMIN — BENZOCAINE AND MENTHOL 1 LOZENGE: 15; 3.6 LOZENGE ORAL at 23:10

## 2022-06-23 RX ADMIN — ROCURONIUM BROMIDE 50 MG: 50 INJECTION, SOLUTION INTRAVENOUS at 11:41

## 2022-06-23 RX ADMIN — APREPITANT 40 MG: 40 CAPSULE ORAL at 09:30

## 2022-06-23 RX ADMIN — SODIUM CHLORIDE, SODIUM LACTATE, POTASSIUM CHLORIDE, AND CALCIUM CHLORIDE: 600; 310; 30; 20 INJECTION, SOLUTION INTRAVENOUS at 09:32

## 2022-06-23 RX ADMIN — PROPOFOL 25 MG: 10 INJECTION, EMULSION INTRAVENOUS at 11:46

## 2022-06-23 RX ADMIN — Medication 5 MG: at 12:11

## 2022-06-23 RX ADMIN — HYDROMORPHONE HYDROCHLORIDE 0.5 MG: 2 INJECTION INTRAMUSCULAR; INTRAVENOUS; SUBCUTANEOUS at 13:45

## 2022-06-23 RX ADMIN — CELECOXIB 200 MG: 100 CAPSULE ORAL at 21:29

## 2022-06-23 RX ADMIN — SODIUM CHLORIDE, SODIUM LACTATE, POTASSIUM CHLORIDE, AND CALCIUM CHLORIDE: 600; 310; 30; 20 INJECTION, SOLUTION INTRAVENOUS at 18:10

## 2022-06-23 RX ADMIN — HYDRALAZINE HYDROCHLORIDE 25 MG: 50 TABLET, FILM COATED ORAL at 22:33

## 2022-06-23 RX ADMIN — Medication 400 MG: at 21:30

## 2022-06-23 RX ADMIN — EZETIMIBE 10 MG: 10 TABLET ORAL at 18:02

## 2022-06-23 RX ADMIN — TRAZODONE HYDROCHLORIDE 50 MG: 50 TABLET ORAL at 22:33

## 2022-06-23 RX ADMIN — SODIUM CHLORIDE, SODIUM LACTATE, POTASSIUM CHLORIDE, AND CALCIUM CHLORIDE: 600; 310; 30; 20 INJECTION, SOLUTION INTRAVENOUS at 13:22

## 2022-06-23 RX ADMIN — SODIUM CHLORIDE, SODIUM LACTATE, POTASSIUM CHLORIDE, AND CALCIUM CHLORIDE: 600; 310; 30; 20 INJECTION, SOLUTION INTRAVENOUS at 23:54

## 2022-06-23 RX ADMIN — SODIUM CHLORIDE, PRESERVATIVE FREE 5 ML: 5 INJECTION INTRAVENOUS at 21:31

## 2022-06-23 RX ADMIN — GABAPENTIN 300 MG: 300 CAPSULE ORAL at 21:30

## 2022-06-23 RX ADMIN — Medication 5 MG: at 12:13

## 2022-06-23 RX ADMIN — LIDOCAINE HYDROCHLORIDE 80 MG: 20 INJECTION, SOLUTION EPIDURAL; INFILTRATION; INTRACAUDAL; PERINEURAL at 11:41

## 2022-06-23 RX ADMIN — OXYCODONE 10 MG: 5 TABLET ORAL at 23:57

## 2022-06-23 RX ADMIN — GLYCOPYRROLATE 0.4 MG: 0.2 INJECTION, SOLUTION INTRAMUSCULAR; INTRAVENOUS at 13:05

## 2022-06-23 RX ADMIN — Medication 3 MG: at 13:05

## 2022-06-23 RX ADMIN — GABAPENTIN 300 MG: 300 CAPSULE ORAL at 17:30

## 2022-06-23 RX ADMIN — CEFAZOLIN SODIUM 2000 MG: 100 INJECTION, POWDER, LYOPHILIZED, FOR SOLUTION INTRAVENOUS at 20:15

## 2022-06-23 RX ADMIN — DEXAMETHASONE SODIUM PHOSPHATE 10 MG: 10 INJECTION INTRAMUSCULAR; INTRAVENOUS at 12:31

## 2022-06-23 RX ADMIN — PROPOFOL 25 MG: 10 INJECTION, EMULSION INTRAVENOUS at 11:50

## 2022-06-23 RX ADMIN — TOPIRAMATE 100 MG: 100 TABLET, FILM COATED ORAL at 21:30

## 2022-06-23 RX ADMIN — FENTANYL CITRATE 50 MCG: 50 INJECTION INTRAMUSCULAR; INTRAVENOUS at 12:42

## 2022-06-23 RX ADMIN — HYDRALAZINE HYDROCHLORIDE 25 MG: 50 TABLET, FILM COATED ORAL at 17:59

## 2022-06-23 RX ADMIN — Medication 10 MG: at 12:47

## 2022-06-23 RX ADMIN — ACETAMINOPHEN 650 MG: 325 TABLET ORAL at 17:59

## 2022-06-23 RX ADMIN — KETOROLAC TROMETHAMINE 15 MG: 15 INJECTION, SOLUTION INTRAMUSCULAR; INTRAVENOUS at 17:39

## 2022-06-23 RX ADMIN — TIZANIDINE 4 MG: 2 TABLET ORAL at 23:57

## 2022-06-23 RX ADMIN — LISINOPRIL 40 MG: 20 TABLET ORAL at 21:30

## 2022-06-23 RX ADMIN — Medication 5 MG: at 12:07

## 2022-06-23 RX ADMIN — Medication 2000 MG: at 11:57

## 2022-06-23 RX ADMIN — ACETAMINOPHEN 1000 MG: 500 TABLET, FILM COATED ORAL at 09:30

## 2022-06-23 RX ADMIN — Medication 5 MG: at 12:09

## 2022-06-23 RX ADMIN — ATROPINE SULFATE 0.4 MG: 0.4 INJECTION, SOLUTION INTRAMUSCULAR; INTRAVENOUS; SUBCUTANEOUS at 12:10

## 2022-06-23 RX ADMIN — CARVEDILOL 12.5 MG: 12.5 TABLET, FILM COATED ORAL at 21:30

## 2022-06-23 ASSESSMENT — PAIN - FUNCTIONAL ASSESSMENT
PAIN_FUNCTIONAL_ASSESSMENT: PREVENTS OR INTERFERES SOME ACTIVE ACTIVITIES AND ADLS
PAIN_FUNCTIONAL_ASSESSMENT: PREVENTS OR INTERFERES SOME ACTIVE ACTIVITIES AND ADLS
PAIN_FUNCTIONAL_ASSESSMENT: 0-10

## 2022-06-23 ASSESSMENT — PAIN SCALES - GENERAL
PAINLEVEL_OUTOF10: 0
PAINLEVEL_OUTOF10: 7
PAINLEVEL_OUTOF10: 8
PAINLEVEL_OUTOF10: 8
PAINLEVEL_OUTOF10: 6
PAINLEVEL_OUTOF10: 7
PAINLEVEL_OUTOF10: 2

## 2022-06-23 ASSESSMENT — PAIN DESCRIPTION - ORIENTATION
ORIENTATION: MID;LOWER
ORIENTATION: MID

## 2022-06-23 ASSESSMENT — PAIN DESCRIPTION - PAIN TYPE
TYPE: SURGICAL PAIN
TYPE: SURGICAL PAIN

## 2022-06-23 ASSESSMENT — PAIN DESCRIPTION - FREQUENCY
FREQUENCY: INTERMITTENT
FREQUENCY: INTERMITTENT

## 2022-06-23 ASSESSMENT — PAIN DESCRIPTION - DESCRIPTORS
DESCRIPTORS: ACHING
DESCRIPTORS: ACHING

## 2022-06-23 ASSESSMENT — PAIN DESCRIPTION - ONSET
ONSET: GRADUAL
ONSET: GRADUAL

## 2022-06-23 ASSESSMENT — PAIN DESCRIPTION - LOCATION
LOCATION: BACK

## 2022-06-23 NOTE — ANESTHESIA PROCEDURE NOTES
Airway  Date/Time: 6/23/2022 11:46 AM  Urgency: elective    Airway not difficult    General Information and Staff    Patient location during procedure: OR  Resident/CRNA: HECTOR Harrison - CRNA    Indications and Patient Condition  Indications for airway management: anesthesia  Spontaneous Ventilation: absent  Sedation level: deep  Preoxygenated: yes  Patient position: sniffing  MILS not maintained throughout  Mask difficulty assessment: vent by bag mask    Final Airway Details  Final airway type: endotracheal airway      Successful airway: ETT  Cuffed: yes   Successful intubation technique: direct laryngoscopy  Facilitating devices/methods: intubating stylet  Endotracheal tube insertion site: oral  Blade: Marie  Blade size: #3  ETT size (mm): 7.0  Cormack-Lehane Classification: grade I - full view of glottis  Placement verified by: chest auscultation   Measured from: teeth  ETT to teeth (cm): 22  Number of attempts at approach: 1  Ventilation between attempts: bag mask  Number of other approaches attempted: 0    no

## 2022-06-23 NOTE — PROGRESS NOTES
TRANSFER - IN REPORT:    Verbal report received fromCODY RN(name) on Anders Bowman  being received fromPAR  (unit) for routine post-op      Report consisted of patients Situation, Background, Assessment and   Recommendations(SBAR). Information from the following report(s) Nurse Handoff Report was reviewed with the receiving nurse. Opportunity for questions and clarification was provided.       Assessment completed upon patients arrival to unit and care assume

## 2022-06-23 NOTE — ANESTHESIA PRE PROCEDURE
Department of Anesthesiology  Preprocedure Note       Name:  Anton Garcia   Age:  69 y.o.  :  1955                                          MRN:  [id number]         Date:  2022      Surgeon: Ced José):  Ivette Bowers III, MD    Procedure: Procedure(s):  THORACIC LAMINOTOMY AND PLACEMENT OF SPINAL CORD STIMULATOR LEAD AND BATTERY/ANS    Medications prior to admission:   Prior to Admission medications    Medication Sig Start Date End Date Taking? Authorizing Provider   oxyCODONE (OXYCONTIN) 10 MG extended release tablet Take 10 mg by mouth as needed for Pain. Historical Provider, MD   carvedilol (COREG) 6.25 MG tablet Take 6.25 mg by mouth as needed Per pt depends on HR    Historical Provider, MD   gabapentin (NEURONTIN) 300 MG capsule Take 300 mg by mouth 3 times daily. Historical Provider, MD   DULoxetine (CYMBALTA) 60 MG extended release capsule Take 60 mg by mouth daily    Historical Provider, MD   ascorbic acid (VITAMIN C) 250 MG tablet Take by mouth daily    Ar Automatic Reconciliation   aspirin 81 MG chewable tablet Take 81 mg by mouth at bedtime Preventative Only-pt denies heart attack, stents, blood clots.  Per pt possibly had a TIA in the past    Ar Automatic Reconciliation   carvedilol (COREG) 12.5 MG tablet Take 12.5 mg by mouth as needed Per pt depends on HR 21   Ar Automatic Reconciliation   celecoxib (CELEBREX) 200 MG capsule Take 200 mg by mouth 2 times daily    Ar Automatic Reconciliation   chlorthalidone (HYGROTON) 25 MG tablet Take 25 mg by mouth as needed  10/22/21   Ar Automatic Reconciliation   vitamin D3 (CHOLECALCIFEROL) 125 MCG (5000 UT) TABS tablet Take by mouth daily    Ar Automatic Reconciliation   coenzyme Q10 200 MG CAPS capsule Take 200 mg by mouth daily     Ar Automatic Reconciliation   DULoxetine (CYMBALTA) 30 MG extended release capsule Take 30 mg by mouth at bedtime     Ar Automatic Reconciliation   estradiol (ESTRACE) 0.5 MG tablet Take 0.5 mg by mouth daily  2/7/17   Ar Automatic Reconciliation   ezetimibe (ZETIA) 10 MG tablet Take 10 mg by mouth daily 10/22/21   Ar Automatic Reconciliation   ferrous sulfate (IRON 325) 325 (65 Fe) MG tablet Take 325 mg by mouth Per pt takes 3-4 times a week    Ar Automatic Reconciliation   hydrALAZINE (APRESOLINE) 25 MG tablet Take 25 mg by mouth every 6 hours  11/4/21   Ar Automatic Reconciliation   insulin NPH (HUMULIN N;NOVOLIN N) 100 UNIT/ML injection vial Inject 10 Units into the skin every morning     Ar Automatic Reconciliation   insulin regular (HUMULIN R;NOVOLIN R) 100 UNIT/ML injection Inject into the skin Sliding scale    Ar Automatic Reconciliation   ketoconazole (NIZORAL) 2 % cream Apply topically as needed     Ar Automatic Reconciliation   levothyroxine (SYNTHROID) 125 MCG tablet Take by mouth every morning (before breakfast)    Ar Automatic Reconciliation   Lidocaine HCl 2.75 % LOTN Apply topically as needed     Ar Automatic Reconciliation   lisinopril (PRINIVIL;ZESTRIL) 40 MG tablet Take 40 mg by mouth at bedtime  10/22/21   Ar Automatic Reconciliation   loratadine (CLARITIN) 10 MG tablet Strength: 10 mg; Form: tablet; SIG: take 1 tab(s) orally once a day 6/1/15   Ar Automatic Reconciliation   magnesium oxide (MAG-OX) 400 MG tablet Take 500 mg by mouth 2 times daily    Ar Automatic Reconciliation   omeprazole (PRILOSEC) 20 MG delayed release capsule Take 20 mg by mouth 2 times daily    Ar Automatic Reconciliation   ondansetron (ZOFRAN) 4 MG tablet Take 4 mg by mouth every 8 hours as needed    Ar Automatic Reconciliation   tiZANidine (ZANAFLEX) 4 MG tablet Take 4 mg by mouth as needed     Ar Automatic Reconciliation   topiramate (TOPAMAX) 100 MG tablet Take by mouth 2 times daily    Ar Automatic Reconciliation   traZODone (DESYREL) 50 MG tablet Take 50 mg by mouth nightly     Ar Automatic Reconciliation       Current medications:    Current Facility-Administered Medications   Medication Dose Route Frequency Provider Last Rate Last Admin    lidocaine PF 1 % injection 1 mL  1 mL IntraDERmal Once PRN Amber Atwood MD        ceFAZolin (ANCEF) 2000 mg in sterile water 20 mL IV syringe  2,000 mg IntraVENous On Call to [address] [place] [address]. Camilla Ward III, MD        lactated ringers infusion   IntraVENous Continuous Amber Atwood  mL/hr at 06/23/22 0932 New Bag at 06/23/22 0932       Allergies: Allergies   Allergen Reactions    Latex Rash     itching    Benzodiazepines Other (See Comments)     Dr. Erasmo Teixeira didn't wont medication combined with other medications pt was taking    Diltiazem Other (See Comments)    Amlodipine Other (See Comments)       Problem List:    Patient Active Problem List   Diagnosis Code    Opioid dependence ([address] F11.20    Ulcer of right foot ([address] L97.519    HTN (hypertension) I10    Typical atrial flutter ([address] I48.3    Other chest pain R07.89    Hypercapnic acidosis E87.2    Pure hypercholesterolemia E78.00    Fibromyalgia M79.7    Diarrhea R19.7    Murmur R01.1    Respiratory failure ([address] J96.90    Weakness R53.1    Major depressive disorder, recurrent (HCC) F33.9    Generalized anxiety disorder F41.1    Hyperglycemia R73.9    Agatston coronary artery calcium score between 100 and 199 R93.1    HEIDY (obstructive sleep apnea) G47.33    Edema R60.9    Paroxysmal atrial fibrillation (HCC) I48.0    Type 2 diabetes mellitus without complication (HCC) M98.2    Primary hypothyroidism E03.9    Obesity E66.9    Chronic low back pain M54.50, G89.29       Past Medical History:        Diagnosis Date    Arrhythmia     Arthritis     Atrial septal defect 10/26/2016    Small PFO.  Followed by Dr. Brina Zhang Autoimmune disease [facility])     fibromyalgia    Chronic pain     fibromyalgia; chronic back pain     Diarrhea 12/15/2010    Edema 10/26/2016    Fibromyalgia 12/11/2010    GERD (gastroesophageal reflux disease)     HTN (hypertension) 12/11/2010    Hypercapnic acidosis 12/11/2010    Hyperglycemia 12/11/2010    Major depressive disorder, recurrent ([facility] 03/13/2017    Obesity, Class III, BMI 40-49.9 (morbid obesity) ([address]     also type II diabetes and sleep apnea    Opioid dependence ([address] 12/11/2010    Palpitations 10/26/2016    Melissa Byrne returns for followup. We did not detect any arrhythmias on her event recorder. .  She takes her blood pressure with a wrist cuff and occasionally notes high pressures and heart rates in the 190. Reviewing her old records she did have an EP study 2000 this showed easily inducible atrial flutter was apparently on Rythmol for a period of time.   She now relates that she was hospitalized after     Paroxysmal atrial fibrillation ([facility] 10/26/2016    Preop cardiovascular exam 01/23/2017    Primary hypothyroidism 12/13/2010    Prolonged emergence from general anesthesia     slow to wake up with hysterectomy    Psychiatric disorder     depression, anxiety    Respiratory failure ([address] 12/11/2010    Stroke ([facility] 2010    was told had probable tia x2    Thyroid disease     hypo    TIA (transient ischemic attack) 10/26/2016    Type 2 diabetes mellitus without complication ([facility] 94/46/8334    insulin reliant; AVG -130; s.s. of hypoglycemia @ 70s; last A1C 6.8 on 06/16/2022    Typical atrial flutter ([facility] 01/23/2017    Unspecified adverse effect of anesthesia     pt had difficulty waking up once per pt    Unspecified sleep apnea     uses 3 Liters oxygen nightly    Weakness 12/13/2010       Past Surgical History:        Procedure Laterality Date    CARDIAC CATHETERIZATION      COLONOSCOPY N/A 5/29/2018    COLONOSCOPY/ 39 performed by Virgil Diaz MD at [facility] ENDOSCOPY    COLONOSCOPY N/A 12/5/2017    COLONOSCOPY /   BMI 44 performed by Virgil Diaz MD at [facility] ENDOSCOPY    GASTRIC BYPASS SURGERY      STELLA AND BSO (CERVIX REMOVED)         Social History:    Social History     Tobacco Use    Smoking status: Never Smoker    Smokeless tobacco: Never Used   Substance Use Topics    Alcohol use: No                                Counseling given: Not Answered      Vital Signs (Current):   Vitals:    06/23/22 0905   BP: (!) 167/73   Pulse: 60   Resp: 16   Temp: 97.8 °F (36.6 °C)   TempSrc: Oral   SpO2: 100%   Weight: 213 lb (96.6 kg)   Height: 5' 7\" (1.702 m)                                              BP Readings from Last 3 Encounters:   06/23/22 (!) 167/73   06/16/22 123/61   04/18/22 134/64       NPO Status: Time of last liquid consumption: 0000                        Time of last solid consumption: 0000                        Date of last liquid consumption: 06/22/22                        Date of last solid food consumption: 06/22/22    BMI:   Wt Readings from Last 3 Encounters:   06/23/22 213 lb (96.6 kg)   06/16/22 216 lb 14.4 oz (98.4 kg)   04/18/22 212 lb 12.8 oz (96.5 kg)     Body mass index is 33.36 kg/m².     CBC:   Lab Results   Component Value Date    WBC 8.3 06/16/2022    RBC 3.86 06/16/2022    HGB 11.4 06/16/2022    HCT 35.9 06/16/2022    MCV 93.0 06/16/2022    RDW 14.3 06/16/2022     06/16/2022       CMP:   Lab Results   Component Value Date     06/16/2022    K 4.2 06/16/2022     06/16/2022    CO2 28 06/16/2022    BUN 36 06/16/2022    CREATININE 0.93 06/16/2022    GFRAA >60 06/16/2022    LABGLOM >60 06/16/2022    GLUCOSE 77 06/16/2022    CALCIUM 9.1 06/16/2022       POC Tests:   Recent Labs     06/23/22  0921 06/23/22 0922   POCGLU  --  124*   POCK 3.4*  --        Coags: No results found for: PROTIME, INR, APTT    HCG (If Applicable): No results found for: PREGTESTUR, PREGSERUM, HCG, HCGQUANT     ABGs: No results found for: PHART, PO2ART, ZEM4XKV, VJF8DXT, BEART, P4MNXMYX     Type & Screen (If Applicable):  No results found for: LABABO, LABRH    Drug/Infectious Status (If Applicable):  No results found for: HIV, HEPCAB    COVID-19 Screening (If Applicable): No results found for: COVID19        Anesthesia Evaluation  Patient summary reviewed and Nursing notes reviewed no history of anesthetic complications:   Airway: Mallampati: II  TM distance: >3 FB   Neck ROM: full  Mouth opening: > = 3 FB   Dental: normal exam         Pulmonary:normal exam    (+) sleep apnea (resolved with weight loss after bariatric surgery):                             Cardiovascular:  Exercise tolerance: poor (<4 METS), Limited by pain  (+) hypertension:, CAD:, dysrhythmias (paf/ hx typical aflutter): atrial flutter and atrial fibrillation, hyperlipidemia                  Neuro/Psych:   (+) TIA, psychiatric history:            GI/Hepatic/Renal:   (+) GERD: well controlled,           Endo/Other:    (+) DiabetesType II DM, well controlled, using insulin, hypothyroidism::., .                 Abdominal:             Vascular: negative vascular ROS. Other Findings:           Anesthesia Plan      general     ASA 3       Induction: intravenous. Anesthetic plan and risks discussed with spouse and patient.                         Yoli Hartley MD   6/23/2022

## 2022-06-23 NOTE — BRIEF OP NOTE
Brief Postoperative Note      Patient: Tj Bell  YOB: 1955  MRN: [id number]    Date of Procedure: 6/23/2022    Pre-Op Diagnosis:     Post-Op Diagnosis: chronic pain syndrome       Procedure(s):  T8-9 THORACIC LAMINOTOMY AND PLACEMENT OF SPINAL CORD STIMULATOR LEAD AND BATTERY/ANS    Surgeon(s):  Jeremi Diego III, MD    Assistant:  staff  Anesthesia: General    Estimated Blood Loss (mL): minimal    Complications: none    Specimens:   * No specimens in log *    Implants:  Implant Name Type Inv.  Item Serial No.  Lot No. LRB No. Used Action   GENERATOR NEUROSTIMULATOR [id number] - [id number]  GENERATOR NEUROSTIMULATOR [id number]  Left 1 Implanted   LEAD NEUROSTIMULATOR L60CM MR CONDITIONAL PENTA - [id number]  LEAD NEUROSTIMULATOR L60CM MR CONDITIONAL PENTA [id number] NEUOMODUL[id number]  Left 1 Implanted         Drains: * No LDAs found *    Findings: none    Electronically signed by Yuko Dior MD on 6/23/2022 at 1:15 PM

## 2022-06-23 NOTE — H&P
Name: Yves Mazariegos   YOB: 1955   Gender: female   MRN: [id number]      DATE: 6/23/2022      CC: Referral / Consult ([facility])          HPI this is a recheck on patient Patricia Griffiths. She has low back and left leg pain secondary to an L5-S1 spondylolisthesis with spinal stenosis. She also has a large scoliosis above and what appears  to be notable osteopenia of the spine therefore I thought that surgical treatment would have a high risk of subsequent problems and the patient did not want a large surgery. We sent her for a spinal cord stimulator trial which she had 1 to 2 months ago  and she states that she got 80+ percent improvement of her pain. She is very anxious to have a permanent lead implant. PE: She is normal in appearance and is alert and oriented x3. She has a notable right-sided thoracolumbar scoliosis. Her pupils are equal  round and reactive to light and her neck is without adenopathy or bruit. Lungs are clear to auscultation bilaterally and her heart is regular rate and rhythm with a murmur that is known and been followed. Her abdomen soft and nontender. I checked her  back and there is an area above the lumbar spine where the spine straightens out. Her flank area could possibly hold the battery but she actually has reasonable muscle tissue in the buttocks and I think this would be better area for placement of the  battery. CURRENT MEDS:       Current Outpatient Medications:      buprenorphine HCL (Belbuca) 150 mcg film, Belbuca 150 mcg buccal film  PLACE ONE FILM INSIDE THE CHEEK EVERY 12 HOURS, Disp: , Rfl:      carvediloL (COREG) 12.5 mg tablet, Take 1 Tablet by mouth two (2) times daily (with meals). Can take an extra dose for increased HR > 80, Disp: 60 Tablet, Rfl: 11     hydrALAZINE (APRESOLINE) 25 mg tablet, Take 1 Tablet by mouth four (4) times daily. , Disp: 120 Tablet, Rfl: 11     chlorthalidone (HYGROTON) 25 mg tablet, Take 1 Tablet by mouth daily. , Disp: 30 Tablet, Rfl: 11     ezetimibe (ZETIA) 10 mg tablet, Take 1 Tablet by mouth daily. , Disp: 30 Tablet, Rfl: 11     lisinopriL (PRINIVIL, ZESTRIL) 40 mg tablet, Take 1 Tablet by mouth nightly., Disp: 30 Tablet, Rfl: 11     pitavastatin calcium (Livalo) 2 mg tablet, Take 1 Tablet by mouth daily. , Disp: 30 Tablet, Rfl: 5     gabapentin (NEURONTIN) 300 mg capsule, three (3) times daily. , Disp: , Rfl:      prenatal vit-iron fumarate-fa (PRENATAL PLUS with IRON) 28 mg iron- 800 mcg tab, Take 1 Tablet by mouth daily. , Disp: , Rfl:      ferrous sulfate (Iron) 325 mg (65 mg iron) tablet, Take  by mouth Daily (before breakfast). , Disp: , Rfl:      bempedoic acid (Nexletol) 180 mg tab, Take 180 mg by mouth daily. , Disp: 30 Each, Rfl: 5     DULoxetine (Cymbalta) 30 mg capsule, Take  by mouth. Takes 60mg in the am & 30mg in the pm, Disp: , Rfl:      ketoconazole (NIZORAL) 2 % topical cream, Apply  to affected area daily. , Disp: , Rfl:      coenzyme Q-10 (Co Q-10) 200 mg capsule, Take  by mouth daily. , Disp: , Rfl:      topiramate (Topamax) 100 mg tablet, Take  by mouth two (2) times a day., Disp: , Rfl:      ascorbic acid, vitamin C, (Vitamin C) 250 mg tablet, Take  by mouth daily. , Disp: , Rfl:      levothyroxine (SYNTHROID) 125 mcg tablet, Take  by mouth Daily (before breakfast). , Disp: , Rfl:      lidocaine HCL 2.75 % lotn, by Apply Externally route as needed. , Disp: , Rfl:      magnesium oxide (MAG-OX) 400 mg tablet, Take 500 mg by mouth two (2) times a day., Disp: , Rfl:      B.infantis-B.ani-B.long-B.bifi (Probiotic 4X) 10-15 mg TbEC, Take  by mouth daily. , Disp: , Rfl:      tiZANidine (ZANAFLEX) 4 mg tablet, Take 4 mg by mouth two (2) times a day., Disp: , Rfl:      traZODone (DESYREL) 50 mg tablet, Take 50 mg by mouth nightly., Disp: , Rfl:      cholecalciferol (VITAMIN D3) (5000 Units/125 mcg) tab tablet, Take  by mouth daily. , Disp: , Rfl:      oxyCODONE-acetaminophen (Percocet)  mg per tablet, Take  by mouth every six (6) hours as needed. every day, Disp: , Rfl:      celecoxib (CeleBREX) 200 mg capsule, Take 200 mg by mouth two (2) times a day., Disp: , Rfl:      omeprazole (PRILOSEC) 20 mg capsule, Take 20 mg by mouth two (2) times a day., Disp: , Rfl:      ondansetron hcl (ZOFRAN) 4 mg tablet, Take 4 mg by mouth every eight (8) hours as needed for Nausea., Disp: , Rfl:      aspirin 81 mg chewable tablet, Take 81 mg by mouth nightly. Indications: prevention of cerebrovascular accident, Disp: , Rfl:      insulin regular (NOVOLIN R, HUMULIN R) 100 unit/mL injection, 5 Units by SubCUTAneous route. Sliding scale, Disp: , Rfl:      loratadine (CLARITIN) 10 mg tablet, Strength: 10 mg; Form: tablet; SIG: take 1 tab(s) orally once a day, Disp: , Rfl:      estradiol (ESTRACE) 0.5 mg tablet, Take 0.5 mg by mouth., Disp: , Rfl:      insulin NPH (HUMULIN N) 100 unit/mL injection, 10 Units by SubCUTAneous route every morning., Disp: , Rfl:           ASSESSMENT/PLAN:  We discussed her medical problems and she is an insulin-dependent diabetic she is on aspirin for A. fib but she has no other medical problems. She reports her leg pain is being in  the anterior and lateral thigh to the knee and she also reports that she has had a gastric bypass and lost 100 pounds. I discussed the procedure in detail with the patient and discussed the risk which include death paralysis and an infection as well  as bleeding, heart attack stroke, clot in leg or lung, migration of the lead and failure to get adequate pain relief. Best indication of how she will do after surgery is how she did with the trial and she did excellent. I told her that our biggest concern  overall is an infection and she is an insulin-dependent diabetic so it might be best to keep her in the hospital overnight and give her IV vancomycin and send her home the following day on p.o. antibiotics.   I discussed that generally this is an outpatient  surgery. We discussed the fact she will have restrictions of no reaching overhead twisting and bending for 6 weeks because this could make the lead move. She understands and would like to proceed. We will schedule her for a [employer] spinal  cord stimulator lead and battery. Diagnoses and all orders for this visit:      1. Chronic pain syndrome   -     XR SPINE THORACOLUMB JUNCTION MIN 2 V; Future      2. Lumbosacral neuritis      3. Spinal stenosis of lumbar region with neurogenic claudication      4. Spondylolisthesis of lumbar region                   Orders Placed This Encounter          XR SPINE THORACOLUMB JUNCTION MIN 2 V              Follow-up and Dispositions  ·      Return for Surgery.                  Electronically signed by Latha Jaramillo MD                                     Last signed by: Lorna Li MD at 6/23/2022  11:00 AM

## 2022-06-23 NOTE — PERIOP NOTE
TRANSFER - OUT REPORT:    Verbal report given to AL, RN on Tj Bell  being transferred to 7th floor for routine progression of patient care       Report consisted of patients Situation, Background, Assessment and   Recommendations(SBAR). Information from the following report(s) Nurse Handoff Report, Index, Adult Overview and Surgery Report was reviewed with the receiving nurse. Lines:   Peripheral IV 06/23/22 Right;Posterior Forearm (Active)   Site Assessment Clean, dry & intact 06/23/22 1410   Line Status Capped 06/23/22 1410   Line Care Connections checked and tightened 06/23/22 1410   Phlebitis Assessment No symptoms 06/23/22 1410   Infiltration Assessment 0 06/23/22 1410   Alcohol Cap Used No 06/23/22 1410   Dressing Status Clean, dry & intact 06/23/22 1410   Dressing Type Transparent 06/23/22 1410        Opportunity for questions and clarification was provided. Patient transported with:   Tech    VTE prophylaxis orders have been written for Tj Bell. Patient and family given floor number and nurses name. Family updated re: pt status after security code verified.

## 2022-06-24 VITALS
OXYGEN SATURATION: 98 % | HEIGHT: 67 IN | TEMPERATURE: 97.7 F | DIASTOLIC BLOOD PRESSURE: 72 MMHG | BODY MASS INDEX: 33.43 KG/M2 | HEART RATE: 68 BPM | SYSTOLIC BLOOD PRESSURE: 114 MMHG | WEIGHT: 213 LBS | RESPIRATION RATE: 20 BRPM

## 2022-06-24 LAB
GLUCOSE BLD STRIP.AUTO-MCNC: 152 MG/DL (ref 65–100)
GLUCOSE BLD STRIP.AUTO-MCNC: 167 MG/DL (ref 65–100)
SERVICE CMNT-IMP: ABNORMAL
SERVICE CMNT-IMP: ABNORMAL

## 2022-06-24 PROCEDURE — 2580000003 HC RX 258: Performed by: ORTHOPAEDIC SURGERY

## 2022-06-24 PROCEDURE — 97530 THERAPEUTIC ACTIVITIES: CPT

## 2022-06-24 PROCEDURE — G0378 HOSPITAL OBSERVATION PER HR: HCPCS

## 2022-06-24 PROCEDURE — 2500000003 HC RX 250 WO HCPCS: Performed by: ORTHOPAEDIC SURGERY

## 2022-06-24 PROCEDURE — 6360000002 HC RX W HCPCS: Performed by: ORTHOPAEDIC SURGERY

## 2022-06-24 PROCEDURE — 94760 N-INVAS EAR/PLS OXIMETRY 1: CPT

## 2022-06-24 PROCEDURE — 97161 PT EVAL LOW COMPLEX 20 MIN: CPT

## 2022-06-24 PROCEDURE — 6370000000 HC RX 637 (ALT 250 FOR IP): Performed by: ORTHOPAEDIC SURGERY

## 2022-06-24 PROCEDURE — 82962 GLUCOSE BLOOD TEST: CPT

## 2022-06-24 RX ADMIN — KETOROLAC TROMETHAMINE 15 MG: 15 INJECTION, SOLUTION INTRAMUSCULAR; INTRAVENOUS at 04:17

## 2022-06-24 RX ADMIN — CELECOXIB 200 MG: 100 CAPSULE ORAL at 09:49

## 2022-06-24 RX ADMIN — CETIRIZINE HYDROCHLORIDE 10 MG: 10 TABLET ORAL at 09:50

## 2022-06-24 RX ADMIN — OXYCODONE 10 MG: 5 TABLET ORAL at 05:52

## 2022-06-24 RX ADMIN — OXYCODONE 10 MG: 5 TABLET ORAL at 10:10

## 2022-06-24 RX ADMIN — GABAPENTIN 300 MG: 300 CAPSULE ORAL at 09:30

## 2022-06-24 RX ADMIN — ESTRADIOL 0.5 MG: 1 TABLET ORAL at 09:48

## 2022-06-24 RX ADMIN — DULOXETINE HYDROCHLORIDE 60 MG: 60 CAPSULE, DELAYED RELEASE ORAL at 09:50

## 2022-06-24 RX ADMIN — CARVEDILOL 12.5 MG: 12.5 TABLET, FILM COATED ORAL at 09:47

## 2022-06-24 RX ADMIN — CEFAZOLIN SODIUM 2000 MG: 100 INJECTION, POWDER, LYOPHILIZED, FOR SOLUTION INTRAVENOUS at 04:16

## 2022-06-24 RX ADMIN — ACETAMINOPHEN 650 MG: 325 TABLET ORAL at 11:17

## 2022-06-24 RX ADMIN — HYDRALAZINE HYDROCHLORIDE 25 MG: 50 TABLET, FILM COATED ORAL at 04:29

## 2022-06-24 RX ADMIN — LEVOTHYROXINE SODIUM 125 MCG: 0.12 TABLET ORAL at 05:19

## 2022-06-24 RX ADMIN — PANTOPRAZOLE SODIUM 40 MG: 40 TABLET, DELAYED RELEASE ORAL at 05:20

## 2022-06-24 RX ADMIN — EZETIMIBE 10 MG: 10 TABLET ORAL at 09:49

## 2022-06-24 RX ADMIN — ACETAMINOPHEN 650 MG: 325 TABLET ORAL at 04:17

## 2022-06-24 RX ADMIN — Medication 400 MG: at 09:48

## 2022-06-24 RX ADMIN — POLYETHYLENE GLYCOL 3350 17 G: 17 POWDER, FOR SOLUTION ORAL at 09:58

## 2022-06-24 RX ADMIN — SODIUM CHLORIDE, PRESERVATIVE FREE 5 ML: 5 INJECTION INTRAVENOUS at 09:58

## 2022-06-24 RX ADMIN — INSULIN HUMAN 10 UNITS: 100 INJECTION, SUSPENSION SUBCUTANEOUS at 10:00

## 2022-06-24 RX ADMIN — TOPIRAMATE 100 MG: 100 TABLET, FILM COATED ORAL at 09:30

## 2022-06-24 ASSESSMENT — PAIN DESCRIPTION - FREQUENCY
FREQUENCY: INTERMITTENT
FREQUENCY: INTERMITTENT

## 2022-06-24 ASSESSMENT — PAIN DESCRIPTION - ONSET
ONSET: GRADUAL
ONSET: PROGRESSIVE

## 2022-06-24 ASSESSMENT — PAIN DESCRIPTION - ORIENTATION
ORIENTATION: MID
ORIENTATION: INNER
ORIENTATION: MID;UPPER

## 2022-06-24 ASSESSMENT — PAIN DESCRIPTION - DESCRIPTORS
DESCRIPTORS: ACHING
DESCRIPTORS: SORE
DESCRIPTORS: ACHING

## 2022-06-24 ASSESSMENT — PAIN DESCRIPTION - LOCATION
LOCATION: BACK

## 2022-06-24 ASSESSMENT — PAIN SCALES - GENERAL
PAINLEVEL_OUTOF10: 6
PAINLEVEL_OUTOF10: 0
PAINLEVEL_OUTOF10: 3
PAINLEVEL_OUTOF10: 6
PAINLEVEL_OUTOF10: 0

## 2022-06-24 ASSESSMENT — PAIN DESCRIPTION - PAIN TYPE
TYPE: SURGICAL PAIN
TYPE: SURGICAL PAIN

## 2022-06-24 ASSESSMENT — PAIN - FUNCTIONAL ASSESSMENT
PAIN_FUNCTIONAL_ASSESSMENT: ACTIVITIES ARE NOT PREVENTED
PAIN_FUNCTIONAL_ASSESSMENT: PREVENTS OR INTERFERES SOME ACTIVE ACTIVITIES AND ADLS

## 2022-06-24 NOTE — PROGRESS NOTES
PHYSICAL THERAPY Initial Assessment and AM  (Link to Caseload Tracking: PT Visit Days : 1  Acknowledge Orders  Time In/Out  PT Charge Capture  Rehab Caseload Tracker    Licha Palacio is a 69 y.o. female   PRIMARY DIAGNOSIS: Chronic pain syndrome  Chronic pain syndrome [G89.4]  Lumbosacral neuritis [M54.17]  Procedure(s) (LRB):  T8-9 THORACIC LAMINOTOMY AND PLACEMENT OF SPINAL CORD STIMULATOR LEAD AND BATTERY/ANS (N/A)  1 Day Post-Op  Reason for Referral: Difficulty in walking, Not elsewhere classified (R26.2)  Other abnormalities of gait and mobility (R26.89)  Low Back Pain (M54.5)  Observation: Payor: MEDICARE / Plan: MEDICARE PART A AND B / Product Type: *No Product type* /     ASSESSMENT:     REHAB RECOMMENDATIONS:   Recommendation to date pending progress:  Setting:   No further skilled therapy after discharge from hospital    Equipment:     None (Pt reports she already has RW)     ASSESSMENT:  Ms. Jacquelin Figueroa is a 69year old F who presents s/p T8-9 laminotomy and placement of spinal cord stimulator. This date pt performs mobility including bed mobility, sitting balance activities, sit <>  Stand transfers, standing balance activities, and ambulation in room and hallway with CGA to SBA. Pt did utilize RW for ambulation for longer distances, but she is overall tolerating mobility well. Verbalized and demonstrated understanding of post-op precautions. Pt presents as functioning just slightly below her baseline, and will benefit from skilled therapy services during her hospital sty to address stated deficits to promote return to highest level of function, independence, and safety. Will continue to follow.      [facility] [address] [address] AM-PAC 6 Clicks Basic Mobility Inpatient Short Form  AM-PAC Mobility Inpatient   How much difficulty turning over in bed?: None  How much difficulty sitting down on / standing up from a chair with arms?: A Little  How much difficulty moving from lying on back to sitting on side of bed?: A Little  How much help from another person moving to and from a bed to a chair?: A Little  How much help from another person needed to walk in hospital room?: A Little  How much help from another person for climbing 3-5 steps with a railing?: A Little  AM-PAC Inpatient Mobility Raw Score : 19  AM-PAC Inpatient T-Scale Score : 45.44  Mobility Inpatient CMS 0-100% Score: 41.77  Mobility Inpatient CMS G-Code Modifier : CK    SUBJECTIVE:   Ms. Eliezer Sun states, \"I may need  The walker  If we are going out in the hallway\"     Social/Functional Lives With: Spouse  Type of Home: House  Home Layout: Multi-level (split level; 7 steps to access bedroom/bathroom)  Home Access: Stairs to enter with rails  Entrance Stairs - Number of Steps: 2  Home Equipment: Cane,Walker, rolling (doesn't use,but has available)    OBJECTIVE:     PAIN: VITALS / O2: PRECAUTION / Lavaun Schlichter / DRAINS:   Pre Treatment:   Pain Assessment: 0-10  Pain Level: 3  Pain Location: Back  Pain Orientation: Mid;Upper  Pain Descriptors: Sore      Post Treatment: 2/10 Vitals        Oxygen      IV    RESTRICTIONS/PRECAUTIONS:                    GROSS EVALUATION: Intact Impaired (Comments):   AROM [x]     PROM [x]    Strength [x]     Balance [] Sitting - Static: Good  Sitting - Dynamic: Good  Standing - Static: Fair,+  Standing - Dynamic: Fair   Posture [] Forward Head  Rounded Shoulders  Thoracic Kyphosis   Sensation [x]     Coordination [x]      Tone []     Edema []    Activity Tolerance []   slightly limited post-op, but WNL    []      COGNITION/  PERCEPTION: Intact Impaired (Comments):   Orientation [x]     Vision [x]     Hearing [x]     Cognition  [x]       MOBILITY: I Mod I S SBA CGA Min Mod Max Total  NT x2 Comments:   Bed Mobility    Rolling [] [] [] [] [x] [] [] [] [] [] []    Supine to Sit [] [] [] [] [x] [] [] [] [] [] []    Scooting [] [] [] [] [x] [] [] [] [] [] []    Sit to Supine [] [] [] [] [x] [] [] [] [] [] []    Transfers    Sit to Stand [] [] [] [] functional mobility within 7 treatment day(s). FREQUENCY AND DURATION: Daily for duration of hospital stay or until stated goals are met, whichever comes first.    THERAPY PROGNOSIS: Good    PROBLEM LIST:   (Skilled intervention is medically necessary to address:)  Decreased Activity Tolerance  Decreased Balance  Decreased Coordination  Decreased Gait Ability  Decreased Strength  Decreased Transfer Abilities  Increased Pain INTERVENTIONS PLANNED:   (Benefits and precautions of physical therapy have been discussed with the patient.)  Self Care Training  Therapeutic Activity  Therapeutic Exercise/HEP  Neuromuscular Re-education  Gait Training  Education       TREATMENT:   EVALUATION: LOW COMPLEXITY: (Untimed Charge)    TREATMENT:   Therapeutic Activity (26 Minutes): Therapeutic activity included Rolling, Supine to Sit, Transfer Training, Ambulation on level ground, Sitting balance  and Standing balance to improve functional Activity tolerance, Balance, Coordination, Mobility and Strength. TREATMENT GRID:  N/A    AFTER TREATMENT PRECAUTIONS: Bed/Chair Locked, Call light within reach, Chair, Needs within reach and RN notified    INTERDISCIPLINARY COLLABORATION:  RN/ PCT and PT/ PTA    EDUCATION: Education Given To: Patient  Education Provided: Role of Therapy;Plan of Care;Precautions;Transfer Training;Equipment; Fall Prevention Strategies  Education Outcome: Verbalized understanding;Demonstrated understanding    TIME IN/OUT:  Time In: 0905  Time Out: [facility]  Minutes: [address] [place], [place]

## 2022-06-24 NOTE — CARE COORDINATION
Chart screened by  for potential discharge needs or concerns. PT eval complete with no further therapy recommended. Pt is medically cleared for dc to home today. Please notify/consult  if any discharge needs arise. 06/24/22 1030   Service Assessment   Patient Orientation Alert and Oriented   Cognition Alert   History Provided By Medical Record   Primary Caregiver Self   Support Systems Spouse/Significant Other   PCP Verified by CM Yes   Last Visit to PCP Within last year   Prior Functional Level Independent in ADLs/IADLs   Current Functional Level Independent in ADLs/IADLs   Can patient return to prior living arrangement Yes   Ability to make needs known: Good   Family able to assist with home care needs: Yes   Social/Functional History   Lives With Spouse   Type of Home House   Home Layout Multi-level  (split level; 7 steps to access bedroom/bathroom)   Home Access Stairs to enter with rails   Entrance Stairs - Number of Steps 2   Home Equipment Cane;Walker, rolling  (doesn't use,but has available)   ADL Assistance Independent   Ambulation Assistance Independent   Transfer Assistance Independent   Occupation Retired   Discharge Planning   Type of [address];N/A   DME Ordered? No   Potential Assistance Purchasing Medications No   Type of Home Care Services None   Patient expects to be discharged to: [facility] Dis[facility]   Services At/After Discharge None   [facility] Information Provided?  No   Mode of Transport at Discharge Self   Confirm Follow Up Transport Self   Condition of Participation: Discharge Planning   The Plan for Transition of Care is related to the following treatment goals: Return home and back to baseline functioning

## 2022-06-24 NOTE — PROGRESS NOTES
ORTH POST OP PROGRESS NOTE    2022  Admit Date: 2022  Admit Diagnosis: Chronic pain syndrome [G89.4]  Lumbosacral neuritis [M54.17]  Procedure: Procedure(s):  T8-9 THORACIC LAMINOTOMY AND PLACEMENT OF SPINAL CORD STIMULATOR LEAD AND BATTERY/ANS  Post Op day: 1 Day Post-Op      Subjective:     Anders Bowman is a patient who has complaints of her SCDs not working. Pain is improved. .       Objective:     Vital Signs:    Blood pressure 114/72, pulse 68, temperature 97.7 °F (36.5 °C), temperature source Oral, resp. rate 20, height 5' 7\" (1.702 m), weight 213 lb (96.6 kg), SpO2 98 %. Temp (24hrs), Av.5 °F (36.4 °C), Min:97.3 °F (36.3 °C), Max:97.7 °F (36.5 °C)      No intake/output data recorded.  1901 -  0700  In: 1000 [I.V.:1000]  Out: 30     LAB:    No results for input(s): HGB, WBC, PLT in the last 72 hours. Physical Exam    General:   Alert and oriented. No acute distress  Lungs:  Respirations unlabored. Extremities: No evidence of cyanosis. Calves soft, nontender. Moves both upper and lower extremities.    Dressing:  clean, dry, and intact  Neuro:  no deficit      Assessment:      Patient Active Problem List   Diagnosis    Opioid dependence ([address]    Ulcer of right foot ([address]    HTN (hypertension)    Typical atrial flutter (HCC)    Other chest pain    Hypercapnic acidosis    Pure hypercholesterolemia    Fibromyalgia    Diarrhea    Murmur    Respiratory failure (HCC)    Weakness    Major depressive disorder, recurrent (HCC)    Generalized anxiety disorder    Hyperglycemia    Agatston coronary artery calcium score between 100 and 199    HEIDY (obstructive sleep apnea)    Edema    Paroxysmal atrial fibrillation (HCC)    Type 2 diabetes mellitus without complication ([address]    Primary hypothyroidism    Obesity    Chronic low back pain    Chronic pain syndrome       Plan:     Continue PT/OT  Discontinue: IV  Consult: none    Anticipate Discharge To: HOME today after working with PT     Discussed with nurse to have patient SCDs checked by bioengineering. Patient instructed to keep her LUISANA hose on for a few days after she is discharged.      Signed By: HECTOR Ryan CNP

## 2022-06-24 NOTE — OP NOTE
[address] [place] [address]  OPERATIVE REPORT    Name:  Severa Mallick  MR#:  [id number]  :  1955  ACCOUNT #:  [de-identified]  DATE OF SERVICE:  2022    PREOPERATIVE DIAGNOSIS:  Chronic pain syndrome with primarily left leg pain. POSTOPERATIVE DIAGNOSIS:  Chronic pain syndrome with primarily left leg pain. PROCEDURES PERFORMED:  1.  T9-10 laminotomy and placement of [employer] paddle spinal cord stimulator lead over T8 and T9, CPT code 93905. 2.  Placement of internal pulse generator from [address] [place] [address] in the left buttocks region, CPT code 93031. SURGEON:  Shin Mckeon MD    ASSISTANT:  Staff. ANESTHESIA:  GETA. COMPLICATIONS:  None. SPECIMENS REMOVED:  None. IMPLANTS:  All [employer]. ESTIMATED BLOOD LOSS:  Minimal.    FLUIDS:  400 mL crystalloid. DRAINS:  None. INDICATIONS:  The patient is a 69-year-old female with chronic pain syndrome with primarily left leg pain. She failed conservative measures and subsequently had undergone a spinal cord stimulator trial last month with excellent relief of her pain greater than 70%. She is here today for placement of a permanent spinal cord stimulator. PROCEDURE:  The patient was brought to the operating room and after administration of anesthesia, IV antibiotic, and placement of monitoring lines, she was positioned prone on the [facility] frame. Her back and buttocks area were prepped with Betadine and sterilely draped. At this point, surgeon called a time-out and confirmed the procedure to be performed, her allergy history and the fact she had received her preoperative IV antibiotics. The C-arm was brought in and we identified the T9-10 area, marked it on the skin, made a midline incision over this with a scalpel and then dissected down through the subcutaneous tissue with electrocautery to the deep fascia. We incised on either sides of the deep fascia and dissected down along the lamina out the facet joint. Hemostasis was obtained with electrocautery and the Aquamantys. We placed an angled curette in the interlaminar space, took an AP C-arm x-ray to determine our location. Once we determined where T9-10 was, we removed the interspinous ligament as well as a portion of the T9 spinous process with a rongeur. We made a hole in the ligamentum flavum with a Penfield-4 and used a Kerrison to do a bilateral laminotomy. We then took the paddle lead and inserted it underneath the lamina of T9 and pushed it up so that it covered the T8 and T9 area. We checked with AP C-arm x-rays and we had to place it several times to get it to the midline at a little off to the left side which would give her ideal coverage. Once we had gotten that position we sutured the lead to the spinous process of T10 with two suture anchors and 0 silk suture. We irrigated the wound and once again cauterized bleeders with the Aquamantys. We then made an incision horizontally over the left buttocks area at about the height of the posterior superior iliac crest with a scalpel and dissected down through the thick fatty subcutaneous tissue with electrocautery until we encountered the deeper fascia. We elevated the soft tissues fat off this deep fascia with electrocautery and hemostasis with electrocautery and the Aquamantys. We then packed this area. We ran a tunneler from the thoracic site down to the battery site and ran the leads out through this tunneler down to the battery area. At this point everybody at the table removed their outer gloves and put on new sterile outer gloves. We obtained the permanent [employer] battery and connected the leads to it and tested the lead battery construct. It was all working appropriately. We placed the battery with the lead looped under it into the battery pocket and sutured it down to the deep fascia with two stitches of 0 silk.   We placed some vancomycin powder on top of this and then closed the subcutaneous tissue over this with interrupted figure-of-eight stitches of 2-0 Vicryl. Skin was closed with a running subcuticular stitch of 3-0 Vicryl. At the thoracic site we checked for bleeding and cauterized any that was seen. We suctioned out the canal and then placed FloSeal over the decompression site. We also placed some vancomycin powder into the wound. We then closed the deep fascia over the loop of lead that we had left loose in this area for strain relief using interrupted figure-of-eight sutures of #1 Vicryl. We then closed the subcutaneous tissue with interrupted stitches of 2-0 Vicryl and the skin was closed with a running subcuticular stitch of 3-0 Vicryl. Dermabond Prineo was applied to both incisions followed by dry sterile dressing. The patient was then rolled supine onto the recovery room bed having tolerated the procedure well.         MD ANDRES [email or web address]  D:  06/23/2022 13:53  T:  06/23/2022 23:29  JOB #:  [id number]

## 2022-06-24 NOTE — PLAN OF CARE
Problem: Chronic Conditions and Co-morbidities  Goal: Patient's chronic conditions and co-morbidity symptoms are monitored and maintained or improved  6/24/2022 0945 by Mer Jaffe RN  Outcome: Completed  6/23/2022 2149 by Sandra Casper RN  Outcome: Progressing     Problem: Safety - Adult  Goal: Free from fall injury  6/24/2022 0945 by Mer Jaffe RN  Outcome: Completed  6/23/2022 2149 by Sandra Casper RN  Outcome: Progressing     Problem: Pain  Goal: Verbalizes/displays adequate comfort level or baseline comfort level  6/24/2022 0945 by Mer Jaffe RN  Outcome: Completed  6/23/2022 2149 by Sandra Casper RN  Outcome: Progressing  Flowsheets (Taken 6/23/2022 1922)  Verbalizes/displays adequate comfort level or baseline comfort level:   Encourage patient to monitor pain and request assistance   Assess pain using appropriate pain scale     Problem: Discharge Planning  Goal: Discharge to home or other facility with appropriate resources  6/24/2022 0945 by Mer Jaffe RN  Outcome: Completed  6/23/2022 2149 by Sandra Casper RN  Outcome: Progressing  Flowsheets (Taken 6/23/2022 2141)  Discharge to home or other facility with appropriate resources: Identify barriers to discharge with patient and caregiver

## 2022-06-24 NOTE — PLAN OF CARE
Problem: Pain  Goal: Verbalizes/displays adequate comfort level or baseline comfort level  Outcome: Progressing  Flowsheets (Taken 6/23/2022 1922)  Verbalizes/displays adequate comfort level or baseline comfort level:   Encourage patient to monitor pain and request assistance   Assess pain using appropriate pain scale     Problem: Discharge Planning  Goal: Discharge to home or other facility with appropriate resources  Outcome: Progressing  Flowsheets (Taken 6/23/2022 2141)  Discharge to home or other facility with appropriate resources: Identify barriers to discharge with patient and caregiver

## 2022-07-13 ENCOUNTER — OFFICE VISIT (OUTPATIENT)
Dept: ORTHOPEDIC SURGERY | Age: 67
End: 2022-07-13

## 2022-07-13 DIAGNOSIS — Z09 POSTOPERATIVE FOLLOW-UP: Primary | ICD-10-CM

## 2022-07-13 PROCEDURE — 99024 POSTOP FOLLOW-UP VISIT: CPT | Performed by: ORTHOPAEDIC SURGERY

## 2022-07-13 RX ORDER — SPIRONOLACTONE 25 MG/1
TABLET ORAL
Status: ON HOLD | COMMUNITY
End: 2022-11-02 | Stop reason: HOSPADM

## 2022-07-13 RX ORDER — SUMATRIPTAN 100 MG/1
TABLET, FILM COATED ORAL
Status: ON HOLD | COMMUNITY
Start: 2021-12-10 | End: 2022-11-02 | Stop reason: HOSPADM

## 2022-07-13 RX ORDER — ONDANSETRON 4 MG/1
TABLET, ORALLY DISINTEGRATING ORAL
Status: ON HOLD | COMMUNITY
Start: 2022-07-10 | End: 2022-11-02 | Stop reason: HOSPADM

## 2022-07-13 RX ORDER — OXYCODONE AND ACETAMINOPHEN 10; 325 MG/1; MG/1
TABLET ORAL
Status: ON HOLD | COMMUNITY
Start: 2022-07-01 | End: 2022-10-11 | Stop reason: SDUPTHER

## 2022-07-13 NOTE — LETTER
Sathya Thompson Buff  1955  69 y.o.  [id number]    Diagnosis Code:                              RT                  LT                  BILAT    DDDZ   LBP   L/S NEURITIS   L SPRAIN   SPONDY   L STENOSIS   L DISC   POST SEVILLA   CRONIC PAIN    SCOLI   LIMB PAIN   TH PAIN   SI JOINT   C DDDZ   C STENOSIS   C DISC   BRACH NEUR   NECK PAIN    CTS   COCCYX   OSTEO   COMP FX   HIP BURS   POST FUS   POST NON   SH PAIN   ARM PAIN    OTHER____________________________________________________________      Radiographic Studies:    CERV/ THORACIC/ LUMBAR MRI       WITH/  W/O  Contrast              _____________________Discogram    CT /Myelogram of _______________                  NCS/EMG  Upper / Lower Extremity________________    MRI of _______________________                     Other______________________    Injections:     ________________________ESI/ SNRB/ FACET                     _______________________Rhizotomies     Authorization to stop blood thinners: _____________________________________________________      Medications:    __________________Sterpred DS                                    ___________________Gabapentin QD/ BID/ TID    __________________Norco/Tramadol   QD / BID / TID    ____________________NSAIDS  QD / BID / TID    __________________Flexeril QD/ BID/ TID                           ____________________Other      Visit Charges:    Recheck 2           Recheck 3               Recheck 4              Recheck 5      INJ ______________________    New PT 2            New PT 3                New PT 4               New PT 5          Pre-op / Post-op      Physical Therapy:    Lumbar  /  Cervical                     ___________________________ (Traction / Ultrasound, Dry Needling)       Referral: Ban Settle /  [facility]   /OTHER: __________________________________________      Follow-up with SEL______________________________________________________________      Work Note: _____________________________________________________________________

## 2022-07-13 NOTE — PROGRESS NOTES
Name: Tosin Richards  YOB: 1955  Gender: female  MRN: [id number]    DATE: 7/13/2022    CC: Post-Op Check       HPI this is a 2-week postoperative check on patient Nnamdi garcía. We placed a [employer] spinal cord stimulator for back and primarily left leg pain. She had a large scoliosis it was difficult to place lead but we placed asymmetrically off to the left side where she described her pain. When she comes in today she says that it seems to help but she still has some pain in the left leg as well as some low back pain. I did tell her that the stimulator would not cover her surgical pain. RADIOGRAPHS: AP lateral thoracolumbar x-rays from 7/13/2022 show her large scoliosis. Her lead is well-positioned mainly over T9 but a little bit over T8 it is asymmetrically tilting off to the left side but has very good coverage of the left side. She still has her strain relief loop. There is been no pullback. It all looks stable. PE: She is a heavyset female who is alert and oriented. Her incisions are both well-healed. CURRENT MEDS:     Current Outpatient Medications:     SUMAtriptan (IMITREX) 100 MG tablet, sumatriptan 100 mg tablet, Disp: , Rfl:     oxyCODONE-acetaminophen (PERCOCET)  MG per tablet, , Disp: , Rfl:     ondansetron (ZOFRAN-ODT) 4 MG disintegrating tablet, , Disp: , Rfl:     spironolactone (ALDACTONE) 25 MG tablet, spironolactone 25 mg tablet, Disp: , Rfl:     pitavastatin (LIVALO) 2 MG TABS tablet, Take 2 mg by mouth daily, Disp: , Rfl:     carvedilol (COREG) 6.25 MG tablet, Take 6.25 mg by mouth as needed Per pt depends on HR, Disp: , Rfl:     gabapentin (NEURONTIN) 300 MG capsule, Take 300 mg by mouth 3 times daily. , Disp: , Rfl:     DULoxetine (CYMBALTA) 60 MG extended release capsule, Take 60 mg by mouth daily, Disp: , Rfl:     ascorbic acid (VITAMIN C) 250 MG tablet, Take by mouth daily, Disp: , Rfl:     aspirin 81 MG chewable tablet, Take 81 mg by mouth at bedtime Preventative Only-pt denies heart attack, stents, blood clots.  Per pt possibly had a TIA in the past, Disp: , Rfl:     carvedilol (COREG) 12.5 MG tablet, Take 12.5 mg by mouth as needed Per pt depends on HR, Disp: , Rfl:     celecoxib (CELEBREX) 200 MG capsule, Take 200 mg by mouth 2 times daily, Disp: , Rfl:     chlorthalidone (HYGROTON) 25 MG tablet, Take 25 mg by mouth as needed , Disp: , Rfl:     vitamin D3 (CHOLECALCIFEROL) 125 MCG (5000 UT) TABS tablet, Take by mouth daily, Disp: , Rfl:     coenzyme Q10 200 MG CAPS capsule, Take 200 mg by mouth daily , Disp: , Rfl:     DULoxetine (CYMBALTA) 30 MG extended release capsule, Take 30 mg by mouth at bedtime , Disp: , Rfl:     estradiol (ESTRACE) 0.5 MG tablet, Take 0.5 mg by mouth daily , Disp: , Rfl:     ezetimibe (ZETIA) 10 MG tablet, Take 10 mg by mouth daily, Disp: , Rfl:     ferrous sulfate (IRON 325) 325 (65 Fe) MG tablet, Take 325 mg by mouth Per pt takes 3-4 times a week, Disp: , Rfl:     hydrALAZINE (APRESOLINE) 25 MG tablet, Take 25 mg by mouth every 6 hours , Disp: , Rfl:     insulin NPH (HUMULIN N;NOVOLIN N) 100 UNIT/ML injection vial, Inject 10 Units into the skin every morning , Disp: , Rfl:     insulin regular (HUMULIN R;NOVOLIN R) 100 UNIT/ML injection, Inject into the skin Sliding scale, Disp: , Rfl:     ketoconazole (NIZORAL) 2 % cream, Apply topically as needed , Disp: , Rfl:     levothyroxine (SYNTHROID) 125 MCG tablet, Take by mouth every morning (before breakfast), Disp: , Rfl:     Lidocaine HCl 2.75 % LOTN, Apply topically as needed , Disp: , Rfl:     lisinopril (PRINIVIL;ZESTRIL) 40 MG tablet, Take 40 mg by mouth at bedtime , Disp: , Rfl:     loratadine (CLARITIN) 10 MG tablet, Strength: 10 mg; Form: tablet; SIG: take 1 tab(s) orally once a day, Disp: , Rfl:     magnesium oxide (MAG-OX) 400 MG tablet, Take 500 mg by mouth 2 times daily, Disp: , Rfl:     omeprazole (PRILOSEC) 20 MG delayed release capsule, Take 20 mg by mouth 2 times daily, Disp: , Rfl:     ondansetron (ZOFRAN) 4 MG tablet, Take 4 mg by mouth every 8 hours as needed, Disp: , Rfl:     tiZANidine (ZANAFLEX) 4 MG tablet, Take 4 mg by mouth as needed , Disp: , Rfl:     topiramate (TOPAMAX) 100 MG tablet, Take by mouth 2 times daily, Disp: , Rfl:     traZODone (DESYREL) 50 MG tablet, Take 50 mg by mouth nightly , Disp: , Rfl:    Allergies   Allergen Reactions    Latex Rash     itching    Benzodiazepines Other (See Comments)     Dr. Sudha Menchaca didn't wont medication combined with other medications pt was taking    Diltiazem Other (See Comments)     Other reaction(s): Headache-Intolerance    Nsaids Other (See Comments) and Swelling     Other reaction(s): Other- (not listed) - Allergy  Elevated Blood Pressure; leg edema  Elevated Blood Pressure; leg edema      Amlodipine Other (See Comments)     Other reaction(s): Headache-Intolerance       ASSESSMENT/PLAN: Told the patient that she still has restrictions about reaching bending and twisting for 4 more weeks and I will see her back in 4 weeks with a new x-ray. I told her she needs to contact her [address] [place] [address] Road representative and have them reprogram her stimulator as we should be able to get much better coverage of her left leg pain. Cris Gunter was seen today for post-op check. Diagnoses and all orders for this visit:    Postoperative follow-up  -     XR THORACOLUMBAR SPINE (MIN 2 VIEWS); Future             No follow-up provider specified. Electronically signed by Shanita Alvarado MD        Elements of this note were created using speech recognition software. As such, errors of speech recognition may be present.

## 2022-08-10 ENCOUNTER — OFFICE VISIT (OUTPATIENT)
Dept: ORTHOPEDIC SURGERY | Age: 67
End: 2022-08-10

## 2022-08-10 DIAGNOSIS — Z09 POSTOPERATIVE FOLLOW-UP: Primary | ICD-10-CM

## 2022-08-10 PROCEDURE — 99024 POSTOP FOLLOW-UP VISIT: CPT | Performed by: ORTHOPAEDIC SURGERY

## 2022-08-10 NOTE — PROGRESS NOTES
Name: Keshia Coronado  YOB: 1955  Gender: female  MRN: [id number]    DATE: 8/10/2022    CC: Follow-up (4 WEEKS)       HPI this is a 6-week postop check on patient Donavan garcía. We placed a spinal cord stimulator and she says it is working well. RADIOGRAPHS: AP lateral thoracolumbar x-rays done today 8/10/2022 shows the lead to be unchanged in position. It is somewhat in a diagonal position across the T8 and T9 vertebral body areas and it is asymmetrically off to the left side where she has been her majority leg pain. PE: Her incisions are healing well. On her battery incision there is some scabs and it looks pretty well healed beneath but she said that some of the scabs have rubbed off with small amount of bleeding. CURRENT MEDS:     Current Outpatient Medications:     SUMAtriptan (IMITREX) 100 MG tablet, sumatriptan 100 mg tablet, Disp: , Rfl:     oxyCODONE-acetaminophen (PERCOCET)  MG per tablet, , Disp: , Rfl:     ondansetron (ZOFRAN-ODT) 4 MG disintegrating tablet, , Disp: , Rfl:     spironolactone (ALDACTONE) 25 MG tablet, spironolactone 25 mg tablet, Disp: , Rfl:     pitavastatin (LIVALO) 2 MG TABS tablet, Take 2 mg by mouth daily, Disp: , Rfl:     carvedilol (COREG) 6.25 MG tablet, Take 6.25 mg by mouth as needed Per pt depends on HR, Disp: , Rfl:     gabapentin (NEURONTIN) 300 MG capsule, Take 300 mg by mouth 3 times daily. , Disp: , Rfl:     DULoxetine (CYMBALTA) 60 MG extended release capsule, Take 60 mg by mouth daily, Disp: , Rfl:     ascorbic acid (VITAMIN C) 250 MG tablet, Take by mouth daily, Disp: , Rfl:     aspirin 81 MG chewable tablet, Take 81 mg by mouth at bedtime Preventative Only-pt denies heart attack, stents, blood clots.  Per pt possibly had a TIA in the past, Disp: , Rfl:     carvedilol (COREG) 12.5 MG tablet, Take 12.5 mg by mouth as needed Per pt depends on HR, Disp: , Rfl:     celecoxib (CELEBREX) 200 MG capsule, Take 200 mg by mouth 2 times (DESYREL) 50 MG tablet, Take 50 mg by mouth nightly , Disp: , Rfl:    Allergies   Allergen Reactions    Latex Rash     itching    Benzodiazepines Other (See Comments)     Dr. Hakan Mayers didn't wont medication combined with other medications pt was taking    Diltiazem Other (See Comments)     Other reaction(s): Headache-Intolerance    Nsaids Other (See Comments) and Swelling     Other reaction(s): Other- (not listed) - Allergy  Elevated Blood Pressure; leg edema  Elevated Blood Pressure; leg edema      Amlodipine Other (See Comments)     Other reaction(s): Headache-Intolerance       ASSESSMENT/PLAN: I recommend that we place some kind of cushioning over her battery site to prevent her close from rubbing against it for another week or 2 and I placed some large Band-Aids over this. At this point she is 6 weeks out from surgery so cannot get rid of her restrictions but I do not want her to submerge her incisions underwater for several more weeks. Sheila Gigi see her as needed but if she has wound problems she should give me a call. Salma Garcia was seen today for follow-up. Diagnoses and all orders for this visit:    Postoperative follow-up  -     XR THORACOLUMBAR SPINE (MIN 2 VIEWS); Future             No follow-up provider specified. Electronically signed by Judith Sandoval MD        Elements of this note were created using speech recognition software. As such, errors of speech recognition may be present.

## 2022-08-10 NOTE — LETTER
[facility]  1955  69 y.o.  [id number]    Diagnosis Code:                              RT                  LT                  BILAT    DDDZ   LBP   L/S NEURITIS   L SPRAIN   SPONDY   L STENOSIS   L DISC   POST SEVILLA   CRONIC PAIN    SCOLI   LIMB PAIN   TH PAIN   SI JOINT   C DDDZ   C STENOSIS   C DISC   BRACH NEUR   NECK PAIN    CTS   COCCYX   OSTEO   COMP FX   HIP BURS   POST FUS   POST NON   SH PAIN   ARM PAIN    OTHER____________________________________________________________      Radiographic Studies:    CERV/ THORACIC/ LUMBAR MRI       WITH/  W/O  Contrast              _____________________Discogram    CT /Myelogram of _______________                  NCS/EMG  Upper / Lower Extremity________________    MRI of _______________________                     Other______________________    Injections:     ________________________ESI/ SNRB/ FACET                     _______________________Rhizotomies     Authorization to stop blood thinners: _____________________________________________________      Medications:    __________________Sterpred DS                                    ___________________Gabapentin QD/ BID/ TID    __________________Norco/Tramadol   QD / BID / TID    ____________________NSAIDS  QD / BID / TID    __________________Flexeril QD/ BID/ TID                           ____________________Other      Visit Charges:    Recheck 2           Recheck 3               Recheck 4              Recheck 5      INJ ______________________    New PT 2            New PT 3                New PT 4               New PT 5          Pre-op / Post-op      Physical Therapy:    Lumbar  /  Cervical                     ___________________________ (Traction / Ultrasound, Dry Needling)       Referral: Sameul Tevin /  [facility]   /OTHER: __________________________________________      Follow-up with SEL______________________________________________________________      Work Note: _____________________________________________________________________

## 2022-10-04 ENCOUNTER — HOSPITAL ENCOUNTER (EMERGENCY)
Dept: CT IMAGING | Age: 67
DRG: 872 | End: 2022-10-04
Payer: MEDICARE

## 2022-10-04 ENCOUNTER — HOSPITAL ENCOUNTER (INPATIENT)
Age: 67
LOS: 6 days | Discharge: NURSING, REHAB OR LONG-TERM CARE | DRG: 872 | End: 2022-10-11
Attending: EMERGENCY MEDICINE | Admitting: INTERNAL MEDICINE
Payer: MEDICARE

## 2022-10-04 ENCOUNTER — HOSPITAL ENCOUNTER (EMERGENCY)
Dept: GENERAL RADIOLOGY | Age: 67
Discharge: HOME | DRG: 872 | End: 2022-10-07
Payer: MEDICARE

## 2022-10-04 DIAGNOSIS — G89.4 CHRONIC PAIN SYNDROME: ICD-10-CM

## 2022-10-04 DIAGNOSIS — N17.9 AKI (ACUTE KIDNEY INJURY) (HCC): ICD-10-CM

## 2022-10-04 DIAGNOSIS — A41.9 SEPSIS, DUE TO UNSPECIFIED ORGANISM, UNSPECIFIED WHETHER ACUTE ORGAN DYSFUNCTION PRESENT (HCC): Primary | ICD-10-CM

## 2022-10-04 DIAGNOSIS — G89.29 CHRONIC BILATERAL LOW BACK PAIN WITHOUT SCIATICA: ICD-10-CM

## 2022-10-04 DIAGNOSIS — R78.81 BACTEREMIA DUE TO GRAM-POSITIVE BACTERIA: ICD-10-CM

## 2022-10-04 DIAGNOSIS — M54.50 CHRONIC BILATERAL LOW BACK PAIN WITHOUT SCIATICA: ICD-10-CM

## 2022-10-04 DIAGNOSIS — L03.115 CELLULITIS OF RIGHT LOWER EXTREMITY: ICD-10-CM

## 2022-10-04 LAB
ALBUMIN SERPL-MCNC: 2.8 G/DL (ref 3.2–4.6)
ALBUMIN/GLOB SERPL: 0.7 {RATIO} (ref 1.2–3.5)
ALP SERPL-CCNC: 84 U/L (ref 50–130)
ALT SERPL-CCNC: 27 U/L (ref 12–65)
ANION GAP SERPL CALC-SCNC: 10 MMOL/L (ref 4–13)
APPEARANCE UR: ABNORMAL
AST SERPL-CCNC: 37 U/L (ref 15–37)
BACTERIA URNS QL MICRO: ABNORMAL /HPF
BILIRUB SERPL-MCNC: 1 MG/DL (ref 0.2–1.1)
BILIRUB UR QL: NEGATIVE
BUN SERPL-MCNC: 54 MG/DL (ref 8–23)
CALCIUM SERPL-MCNC: 9 MG/DL (ref 8.3–10.4)
CHLORIDE SERPL-SCNC: 96 MMOL/L (ref 101–110)
CO2 SERPL-SCNC: 25 MMOL/L (ref 21–32)
COLOR UR: ABNORMAL
CREAT SERPL-MCNC: 1.45 MG/DL (ref 0.6–1)
DIFFERENTIAL METHOD BLD: ABNORMAL
EPI CELLS #/AREA URNS HPF: ABNORMAL /HPF
ERYTHROCYTE [DISTWIDTH] IN BLOOD BY AUTOMATED COUNT: 13.8 % (ref 11.9–14.6)
FLUAV AG NPH QL IA: NEGATIVE
FLUBV AG NPH QL IA: NEGATIVE
GLOBULIN SER CALC-MCNC: 3.9 G/DL (ref 2.3–3.5)
GLUCOSE SERPL-MCNC: 190 MG/DL (ref 65–100)
GLUCOSE UR STRIP.AUTO-MCNC: NEGATIVE MG/DL
HCT VFR BLD AUTO: 35.7 % (ref 35.8–46.3)
HGB BLD-MCNC: 11.6 G/DL (ref 11.7–15.4)
HGB UR QL STRIP: ABNORMAL
KETONES UR QL STRIP.AUTO: 15 MG/DL
LACTATE SERPL-SCNC: 1.3 MMOL/L (ref 0.5–2)
LEUKOCYTE ESTERASE UR QL STRIP.AUTO: ABNORMAL
LYMPHOCYTES # BLD: 0.6 K/UL (ref 0.5–4.6)
LYMPHOCYTES NFR BLD MANUAL: 5 % (ref 16–44)
MCH RBC QN AUTO: 30 PG (ref 26.1–32.9)
MCHC RBC AUTO-ENTMCNC: 32.5 G/DL (ref 31.4–35)
MCV RBC AUTO: 92.2 FL (ref 79.6–97.8)
MONOCYTES # BLD: 0.1 K/UL (ref 0.1–1.3)
MONOCYTES NFR BLD MANUAL: 1 % (ref 3–9)
MUCOUS THREADS URNS QL MICRO: ABNORMAL /LPF
NEUTS BAND NFR BLD MANUAL: 17 % (ref 0–6)
NEUTS SEG # BLD: 11.9 K/UL (ref 1.7–8.2)
NEUTS SEG NFR BLD MANUAL: 77 % (ref 47–75)
NITRITE UR QL STRIP.AUTO: NEGATIVE
NRBC # BLD: 0 K/UL (ref 0–0.2)
PH UR STRIP: 5.5 [PH] (ref 5–9)
PLATELET # BLD AUTO: 119 K/UL (ref 150–450)
PLATELET COMMENT: ADEQUATE
PMV BLD AUTO: 11.1 FL (ref 9.4–12.3)
POTASSIUM SERPL-SCNC: 4 MMOL/L (ref 3.5–5.1)
PROCALCITONIN SERPL-MCNC: 15.7 NG/ML (ref 0–0.49)
PROT SERPL-MCNC: 6.7 G/DL (ref 6.3–8.2)
PROT UR STRIP-MCNC: 30 MG/DL
RBC # BLD AUTO: 3.87 M/UL (ref 4.05–5.2)
RBC #/AREA URNS HPF: ABNORMAL /HPF
RBC MORPH BLD: ABNORMAL
SARS-COV-2 RDRP RESP QL NAA+PROBE: NOT DETECTED
SERVICE CMNT-IMP: NORMAL
SODIUM SERPL-SCNC: 131 MMOL/L (ref 136–145)
SOURCE: NORMAL
SP GR UR REFRACTOMETRY: 1.02 (ref 1–1.02)
SPECIMEN SOURCE: NORMAL
UROBILINOGEN UR QL STRIP.AUTO: 1 EU/DL (ref 0.2–1)
WBC # BLD AUTO: 12.6 K/UL (ref 4.3–11.1)
WBC MORPH BLD: ABNORMAL
WBC URNS QL MICRO: ABNORMAL /HPF

## 2022-10-04 PROCEDURE — 87205 SMEAR GRAM STAIN: CPT

## 2022-10-04 PROCEDURE — 71045 X-RAY EXAM CHEST 1 VIEW: CPT

## 2022-10-04 PROCEDURE — 96375 TX/PRO/DX INJ NEW DRUG ADDON: CPT

## 2022-10-04 PROCEDURE — 87150 DNA/RNA AMPLIFIED PROBE: CPT

## 2022-10-04 PROCEDURE — 87040 BLOOD CULTURE FOR BACTERIA: CPT

## 2022-10-04 PROCEDURE — 80053 COMPREHEN METABOLIC PANEL: CPT

## 2022-10-04 PROCEDURE — 87077 CULTURE AEROBIC IDENTIFY: CPT

## 2022-10-04 PROCEDURE — 6360000002 HC RX W HCPCS: Performed by: EMERGENCY MEDICINE

## 2022-10-04 PROCEDURE — 84145 PROCALCITONIN (PCT): CPT

## 2022-10-04 PROCEDURE — 83605 ASSAY OF LACTIC ACID: CPT

## 2022-10-04 PROCEDURE — 85025 COMPLETE CBC W/AUTO DIFF WBC: CPT

## 2022-10-04 PROCEDURE — 87086 URINE CULTURE/COLONY COUNT: CPT

## 2022-10-04 PROCEDURE — 96374 THER/PROPH/DIAG INJ IV PUSH: CPT

## 2022-10-04 PROCEDURE — 2580000003 HC RX 258: Performed by: EMERGENCY MEDICINE

## 2022-10-04 PROCEDURE — 81001 URINALYSIS AUTO W/SCOPE: CPT

## 2022-10-04 PROCEDURE — 87635 SARS-COV-2 COVID-19 AMP PRB: CPT

## 2022-10-04 PROCEDURE — 87804 INFLUENZA ASSAY W/OPTIC: CPT

## 2022-10-04 PROCEDURE — 99285 EMERGENCY DEPT VISIT HI MDM: CPT

## 2022-10-04 PROCEDURE — 87186 SC STD MICRODIL/AGAR DIL: CPT

## 2022-10-04 RX ORDER — MORPHINE SULFATE 4 MG/ML
4 INJECTION INTRAVENOUS
Status: COMPLETED | OUTPATIENT
Start: 2022-10-04 | End: 2022-10-04

## 2022-10-04 RX ORDER — SODIUM CHLORIDE, SODIUM LACTATE, POTASSIUM CHLORIDE, AND CALCIUM CHLORIDE .6; .31; .03; .02 G/100ML; G/100ML; G/100ML; G/100ML
30 INJECTION, SOLUTION INTRAVENOUS ONCE
Status: COMPLETED | OUTPATIENT
Start: 2022-10-04 | End: 2022-10-05

## 2022-10-04 RX ORDER — ONDANSETRON 2 MG/ML
4 INJECTION INTRAMUSCULAR; INTRAVENOUS
Status: COMPLETED | OUTPATIENT
Start: 2022-10-04 | End: 2022-10-04

## 2022-10-04 RX ADMIN — ONDANSETRON 4 MG: 2 INJECTION INTRAMUSCULAR; INTRAVENOUS at 22:33

## 2022-10-04 RX ADMIN — MORPHINE SULFATE 4 MG: 4 INJECTION INTRAVENOUS at 22:33

## 2022-10-04 RX ADMIN — SODIUM CHLORIDE, POTASSIUM CHLORIDE, SODIUM LACTATE AND CALCIUM CHLORIDE 1848 ML: 600; 310; 30; 20 INJECTION, SOLUTION INTRAVENOUS at 22:33

## 2022-10-04 ASSESSMENT — ENCOUNTER SYMPTOMS
NAUSEA: 0
ABDOMINAL PAIN: 0
SHORTNESS OF BREATH: 0
VOMITING: 0
COUGH: 0
FACIAL SWELLING: 0
BACK PAIN: 1
DIARRHEA: 0

## 2022-10-04 ASSESSMENT — PAIN SCALES - GENERAL
PAINLEVEL_OUTOF10: 0
PAINLEVEL_OUTOF10: 6

## 2022-10-05 ENCOUNTER — HOSPITAL ENCOUNTER (EMERGENCY)
Dept: CT IMAGING | Age: 67
Discharge: HOME | DRG: 872 | End: 2022-10-08
Payer: MEDICARE

## 2022-10-05 PROBLEM — Z96.89 S/P INSERTION OF SPINAL CORD STIMULATOR: Status: ACTIVE | Noted: 2022-10-05

## 2022-10-05 PROBLEM — R78.81 BACTEREMIA DUE TO GRAM-NEGATIVE BACTERIA: Status: ACTIVE | Noted: 2022-10-05

## 2022-10-05 PROBLEM — L03.90 CELLULITIS: Status: ACTIVE | Noted: 2022-10-05

## 2022-10-05 PROBLEM — F41.1 GENERALIZED ANXIETY DISORDER: Status: ACTIVE | Noted: 2017-06-08

## 2022-10-05 PROBLEM — G47.33 OSA (OBSTRUCTIVE SLEEP APNEA): Status: ACTIVE | Noted: 2020-04-21

## 2022-10-05 PROBLEM — L03.115 CELLULITIS OF RIGHT LOWER EXTREMITY: Status: ACTIVE | Noted: 2022-10-05

## 2022-10-05 LAB
ACCESSION NUMBER, LLC1M: ABNORMAL
ACINETOBACTER CALCOAC BAUMANNII COMPLEX BY PCR: NOT DETECTED
BACTEROIDES FRAGILIS BY PCR: NOT DETECTED
BIOFIRE TEST COMMENT: ABNORMAL
C ALBICANS DNA BLD POS QL NAA+NON-PROBE: NOT DETECTED
C GLABRATA DNA BLD POS QL NAA+NON-PROBE: NOT DETECTED
C KRUSEI DNA BLD POS QL NAA+NON-PROBE: NOT DETECTED
C PARAP DNA BLD POS QL NAA+NON-PROBE: NOT DETECTED
C TROPICLS DNA BLD POS QL NAA+NON-PROBE: NOT DETECTED
CANDIDA AURIS BY PCR: NOT DETECTED
CRYPTOCOCCUS NEOFORMANS/GATTII BY PCR: NOT DETECTED
E CLOAC COMP DNA BLD POS NAA+NON-PROBE: NOT DETECTED
E COLI DNA BLD POS QL NAA+NON-PROBE: NOT DETECTED
EKG ATRIAL RATE: 78 BPM
EKG DIAGNOSIS: NORMAL
EKG P AXIS: -8 DEGREES
EKG P-R INTERVAL: 154 MS
EKG Q-T INTERVAL: 392 MS
EKG QRS DURATION: 92 MS
EKG QTC CALCULATION (BAZETT): 446 MS
EKG R AXIS: 0 DEGREES
EKG T AXIS: -11 DEGREES
EKG VENTRICULAR RATE: 78 BPM
ENTEROBACTERALES BY PCR: NOT DETECTED
ENTEROCOCCUS FAECALIS BY PCR: NOT DETECTED
ENTEROCOCCUS FAECIUM BY PCR: NOT DETECTED
GLUCOSE BLD STRIP.AUTO-MCNC: 137 MG/DL (ref 65–100)
GLUCOSE BLD STRIP.AUTO-MCNC: 150 MG/DL (ref 65–100)
GLUCOSE BLD STRIP.AUTO-MCNC: 204 MG/DL (ref 65–100)
GP B STREP DNA BLD POS QL NAA+NON-PROBE: NOT DETECTED
HAEM INFLU DNA BLD POS QL NAA+NON-PROBE: NOT DETECTED
K OXYTOCA DNA BLD POS QL NAA+NON-PROBE: NOT DETECTED
KLEBSIELLA AEROGENES BY PCR: NOT DETECTED
KLEBSIELLA PNEUMONIAE GROUP BY PCR: NOT DETECTED
L MONOCYTOG DNA BLD POS QL NAA+NON-PROBE: NOT DETECTED
N MEN DNA BLD POS QL NAA+NON-PROBE: NOT DETECTED
P AERUGINOSA DNA BLD POS NAA+NON-PROBE: NOT DETECTED
PROTEUS SP DNA BLD POS QL NAA+NON-PROBE: NOT DETECTED
RESISTANT GENE TARGETS: ABNORMAL
S AUREUS DNA BLD POS QL NAA+NON-PROBE: NOT DETECTED
S AUREUS+CONS DNA BLD POS NAA+NON-PROBE: NOT DETECTED
S MARCESCENS DNA BLD POS NAA+NON-PROBE: NOT DETECTED
S PNEUM DNA BLD POS QL NAA+NON-PROBE: NOT DETECTED
S PYO DNA BLD POS QL NAA+NON-PROBE: NOT DETECTED
SALMONELLA SPECIES BY PCR: NOT DETECTED
SERVICE CMNT-IMP: ABNORMAL
STAPHYLOCOCCUS EPIDERMIDIS BY PCR: NOT DETECTED
STAPHYLOCOCCUS LUGDUNENSIS BY PCR: NOT DETECTED
STENOTROPHOMONAS MALTOPHILIA BY PCR: NOT DETECTED
STREPTOCOCCUS DNA BLD POS NAA+NON-PROBE: DETECTED

## 2022-10-05 PROCEDURE — 97530 THERAPEUTIC ACTIVITIES: CPT

## 2022-10-05 PROCEDURE — 1100000000 HC RM PRIVATE

## 2022-10-05 PROCEDURE — 2580000003 HC RX 258: Performed by: EMERGENCY MEDICINE

## 2022-10-05 PROCEDURE — 6360000002 HC RX W HCPCS: Performed by: EMERGENCY MEDICINE

## 2022-10-05 PROCEDURE — 96367 TX/PROPH/DG ADDL SEQ IV INF: CPT

## 2022-10-05 PROCEDURE — 94760 N-INVAS EAR/PLS OXIMETRY 1: CPT

## 2022-10-05 PROCEDURE — 96366 THER/PROPH/DIAG IV INF ADDON: CPT

## 2022-10-05 PROCEDURE — G0378 HOSPITAL OBSERVATION PER HR: HCPCS

## 2022-10-05 PROCEDURE — 2580000003 HC RX 258: Performed by: HOSPITALIST

## 2022-10-05 PROCEDURE — 96365 THER/PROPH/DIAG IV INF INIT: CPT

## 2022-10-05 PROCEDURE — 74177 CT ABD & PELVIS W/CONTRAST: CPT

## 2022-10-05 PROCEDURE — 82962 GLUCOSE BLOOD TEST: CPT

## 2022-10-05 PROCEDURE — 6360000002 HC RX W HCPCS: Performed by: INTERNAL MEDICINE

## 2022-10-05 PROCEDURE — 6360000002 HC RX W HCPCS: Performed by: HOSPITALIST

## 2022-10-05 PROCEDURE — 6370000000 HC RX 637 (ALT 250 FOR IP): Performed by: HOSPITALIST

## 2022-10-05 PROCEDURE — 6370000000 HC RX 637 (ALT 250 FOR IP): Performed by: EMERGENCY MEDICINE

## 2022-10-05 PROCEDURE — 6360000004 HC RX CONTRAST MEDICATION: Performed by: EMERGENCY MEDICINE

## 2022-10-05 PROCEDURE — 2500000003 HC RX 250 WO HCPCS: Performed by: INTERNAL MEDICINE

## 2022-10-05 PROCEDURE — 97162 PT EVAL MOD COMPLEX 30 MIN: CPT

## 2022-10-05 PROCEDURE — 96376 TX/PRO/DX INJ SAME DRUG ADON: CPT

## 2022-10-05 RX ORDER — SODIUM CHLORIDE 9 MG/ML
INJECTION, SOLUTION INTRAVENOUS PRN
Status: DISCONTINUED | OUTPATIENT
Start: 2022-10-05 | End: 2022-10-11 | Stop reason: HOSPADM

## 2022-10-05 RX ORDER — INSULIN LISPRO 100 [IU]/ML
0-4 INJECTION, SOLUTION INTRAVENOUS; SUBCUTANEOUS NIGHTLY
Status: DISCONTINUED | OUTPATIENT
Start: 2022-10-05 | End: 2022-10-11 | Stop reason: HOSPADM

## 2022-10-05 RX ORDER — ATORVASTATIN CALCIUM 10 MG/1
10 TABLET, FILM COATED ORAL DAILY
Status: DISCONTINUED | OUTPATIENT
Start: 2022-10-05 | End: 2022-10-06

## 2022-10-05 RX ORDER — TOPIRAMATE 100 MG/1
100 TABLET, FILM COATED ORAL 2 TIMES DAILY
Status: DISCONTINUED | OUTPATIENT
Start: 2022-10-05 | End: 2022-10-11 | Stop reason: HOSPADM

## 2022-10-05 RX ORDER — OXYCODONE AND ACETAMINOPHEN 10; 325 MG/1; MG/1
1 TABLET ORAL EVERY 6 HOURS PRN
Status: DISCONTINUED | OUTPATIENT
Start: 2022-10-05 | End: 2022-10-08

## 2022-10-05 RX ORDER — ENOXAPARIN SODIUM 100 MG/ML
40 INJECTION SUBCUTANEOUS DAILY
Status: DISCONTINUED | OUTPATIENT
Start: 2022-10-05 | End: 2022-10-11 | Stop reason: HOSPADM

## 2022-10-05 RX ORDER — ACETAMINOPHEN 650 MG/1
650 SUPPOSITORY RECTAL EVERY 6 HOURS PRN
Status: DISCONTINUED | OUTPATIENT
Start: 2022-10-05 | End: 2022-10-08

## 2022-10-05 RX ORDER — TIZANIDINE 2 MG/1
4 TABLET ORAL AS NEEDED
Status: DISCONTINUED | OUTPATIENT
Start: 2022-10-05 | End: 2022-10-05

## 2022-10-05 RX ORDER — SODIUM CHLORIDE 0.9 % (FLUSH) 0.9 %
5-40 SYRINGE (ML) INJECTION EVERY 12 HOURS SCHEDULED
Status: DISCONTINUED | OUTPATIENT
Start: 2022-10-05 | End: 2022-10-11 | Stop reason: HOSPADM

## 2022-10-05 RX ORDER — ACETAMINOPHEN 325 MG/1
650 TABLET ORAL EVERY 6 HOURS PRN
Status: DISCONTINUED | OUTPATIENT
Start: 2022-10-05 | End: 2022-10-08

## 2022-10-05 RX ORDER — ACETAMINOPHEN 500 MG
1000 TABLET ORAL
Status: COMPLETED | OUTPATIENT
Start: 2022-10-05 | End: 2022-10-05

## 2022-10-05 RX ORDER — DEXTROSE MONOHYDRATE 100 MG/ML
INJECTION, SOLUTION INTRAVENOUS CONTINUOUS PRN
Status: DISCONTINUED | OUTPATIENT
Start: 2022-10-05 | End: 2022-10-11 | Stop reason: HOSPADM

## 2022-10-05 RX ORDER — POLYETHYLENE GLYCOL 3350 17 G/17G
17 POWDER, FOR SOLUTION ORAL DAILY PRN
Status: DISCONTINUED | OUTPATIENT
Start: 2022-10-05 | End: 2022-10-11 | Stop reason: HOSPADM

## 2022-10-05 RX ORDER — ONDANSETRON 2 MG/ML
4 INJECTION INTRAMUSCULAR; INTRAVENOUS EVERY 6 HOURS PRN
Status: DISCONTINUED | OUTPATIENT
Start: 2022-10-05 | End: 2022-10-11 | Stop reason: HOSPADM

## 2022-10-05 RX ORDER — OXYCODONE HYDROCHLORIDE 5 MG/1
10 TABLET ORAL
Status: COMPLETED | OUTPATIENT
Start: 2022-10-05 | End: 2022-10-05

## 2022-10-05 RX ORDER — SODIUM CHLORIDE 0.9 % (FLUSH) 0.9 %
5-40 SYRINGE (ML) INJECTION PRN
Status: DISCONTINUED | OUTPATIENT
Start: 2022-10-05 | End: 2022-10-11 | Stop reason: HOSPADM

## 2022-10-05 RX ORDER — DULOXETIN HYDROCHLORIDE 30 MG/1
30 CAPSULE, DELAYED RELEASE ORAL NIGHTLY
Status: DISCONTINUED | OUTPATIENT
Start: 2022-10-05 | End: 2022-10-06

## 2022-10-05 RX ORDER — MORPHINE SULFATE 4 MG/ML
4 INJECTION INTRAVENOUS
Status: COMPLETED | OUTPATIENT
Start: 2022-10-05 | End: 2022-10-05

## 2022-10-05 RX ORDER — SODIUM CHLORIDE 0.9 % (FLUSH) 0.9 %
10 SYRINGE (ML) INJECTION
Status: COMPLETED | OUTPATIENT
Start: 2022-10-05 | End: 2022-10-05

## 2022-10-05 RX ORDER — INSULIN LISPRO 100 [IU]/ML
0-8 INJECTION, SOLUTION INTRAVENOUS; SUBCUTANEOUS
Status: DISCONTINUED | OUTPATIENT
Start: 2022-10-05 | End: 2022-10-11 | Stop reason: HOSPADM

## 2022-10-05 RX ORDER — HYDROMORPHONE HYDROCHLORIDE 1 MG/ML
0.5 INJECTION, SOLUTION INTRAMUSCULAR; INTRAVENOUS; SUBCUTANEOUS
Status: DISCONTINUED | OUTPATIENT
Start: 2022-10-05 | End: 2022-10-06

## 2022-10-05 RX ORDER — 0.9 % SODIUM CHLORIDE 0.9 %
100 INTRAVENOUS SOLUTION INTRAVENOUS ONCE
Status: COMPLETED | OUTPATIENT
Start: 2022-10-05 | End: 2022-10-05

## 2022-10-05 RX ORDER — ONDANSETRON 4 MG/1
4 TABLET, ORALLY DISINTEGRATING ORAL EVERY 8 HOURS PRN
Status: DISCONTINUED | OUTPATIENT
Start: 2022-10-05 | End: 2022-10-11 | Stop reason: HOSPADM

## 2022-10-05 RX ORDER — TIZANIDINE 2 MG/1
4 TABLET ORAL EVERY 12 HOURS PRN
Status: DISCONTINUED | OUTPATIENT
Start: 2022-10-05 | End: 2022-10-09

## 2022-10-05 RX ORDER — ASPIRIN 81 MG/1
81 TABLET, CHEWABLE ORAL NIGHTLY
Status: DISCONTINUED | OUTPATIENT
Start: 2022-10-05 | End: 2022-10-11 | Stop reason: HOSPADM

## 2022-10-05 RX ORDER — TRAZODONE HYDROCHLORIDE 50 MG/1
50 TABLET ORAL NIGHTLY
Status: DISCONTINUED | OUTPATIENT
Start: 2022-10-05 | End: 2022-10-11 | Stop reason: HOSPADM

## 2022-10-05 RX ADMIN — ACETAMINOPHEN 1000 MG: 500 TABLET, FILM COATED ORAL at 01:55

## 2022-10-05 RX ADMIN — PIPERACILLIN AND TAZOBACTAM 3375 MG: 3; .375 INJECTION, POWDER, FOR SOLUTION INTRAVENOUS at 05:13

## 2022-10-05 RX ADMIN — HYDROMORPHONE HYDROCHLORIDE 0.5 MG: 1 INJECTION, SOLUTION INTRAMUSCULAR; INTRAVENOUS; SUBCUTANEOUS at 15:17

## 2022-10-05 RX ADMIN — TOPIRAMATE 100 MG: 100 TABLET, FILM COATED ORAL at 08:59

## 2022-10-05 RX ADMIN — TOPIRAMATE 100 MG: 100 TABLET, FILM COATED ORAL at 20:46

## 2022-10-05 RX ADMIN — SODIUM CHLORIDE, PRESERVATIVE FREE 10 ML: 5 INJECTION INTRAVENOUS at 20:46

## 2022-10-05 RX ADMIN — TUBERCULIN PURIFIED PROTEIN DERIVATIVE 5 UNITS: 5 INJECTION, SOLUTION INTRADERMAL at 16:55

## 2022-10-05 RX ADMIN — OXYCODONE AND ACETAMINOPHEN 1 TABLET: 10; 325 TABLET ORAL at 10:18

## 2022-10-05 RX ADMIN — SODIUM CHLORIDE 100 ML: 9 INJECTION, SOLUTION INTRAVENOUS at 01:35

## 2022-10-05 RX ADMIN — DULOXETINE HYDROCHLORIDE 30 MG: 30 CAPSULE, DELAYED RELEASE ORAL at 20:45

## 2022-10-05 RX ADMIN — ASPIRIN 81 MG: 81 TABLET, CHEWABLE ORAL at 20:46

## 2022-10-05 RX ADMIN — ENOXAPARIN SODIUM 40 MG: 100 INJECTION SUBCUTANEOUS at 08:59

## 2022-10-05 RX ADMIN — OXYCODONE AND ACETAMINOPHEN 1 TABLET: 10; 325 TABLET ORAL at 14:48

## 2022-10-05 RX ADMIN — OXYCODONE 10 MG: 5 TABLET ORAL at 02:35

## 2022-10-05 RX ADMIN — TRAZODONE HYDROCHLORIDE 50 MG: 50 TABLET ORAL at 20:46

## 2022-10-05 RX ADMIN — SODIUM CHLORIDE, PRESERVATIVE FREE 10 ML: 5 INJECTION INTRAVENOUS at 09:00

## 2022-10-05 RX ADMIN — INSULIN LISPRO 2 UNITS: 100 INJECTION, SOLUTION INTRAVENOUS; SUBCUTANEOUS at 16:54

## 2022-10-05 RX ADMIN — ATORVASTATIN CALCIUM 10 MG: 10 TABLET, FILM COATED ORAL at 08:59

## 2022-10-05 RX ADMIN — PIPERACILLIN SODIUM AND TAZOBACTAM SODIUM 4500 MG: 4; .5 INJECTION, POWDER, LYOPHILIZED, FOR SOLUTION INTRAVENOUS at 00:00

## 2022-10-05 RX ADMIN — SODIUM CHLORIDE, PRESERVATIVE FREE 10 ML: 5 INJECTION INTRAVENOUS at 01:35

## 2022-10-05 RX ADMIN — HYDROMORPHONE HYDROCHLORIDE 0.5 MG: 1 INJECTION, SOLUTION INTRAMUSCULAR; INTRAVENOUS; SUBCUTANEOUS at 20:14

## 2022-10-05 RX ADMIN — VANCOMYCIN HYDROCHLORIDE 2000 MG: 10 INJECTION, POWDER, LYOPHILIZED, FOR SOLUTION INTRAVENOUS at 00:51

## 2022-10-05 RX ADMIN — MORPHINE SULFATE 4 MG: 4 INJECTION INTRAVENOUS at 01:17

## 2022-10-05 RX ADMIN — PIPERACILLIN AND TAZOBACTAM 3375 MG: 3; .375 INJECTION, POWDER, FOR SOLUTION INTRAVENOUS at 13:39

## 2022-10-05 RX ADMIN — OXYCODONE AND ACETAMINOPHEN 1 TABLET: 10; 325 TABLET ORAL at 04:38

## 2022-10-05 RX ADMIN — IOPAMIDOL 100 ML: 755 INJECTION, SOLUTION INTRAVENOUS at 01:35

## 2022-10-05 ASSESSMENT — PAIN DESCRIPTION - ORIENTATION
ORIENTATION: RIGHT

## 2022-10-05 ASSESSMENT — PAIN SCALES - GENERAL
PAINLEVEL_OUTOF10: 5
PAINLEVEL_OUTOF10: 8
PAINLEVEL_OUTOF10: 3
PAINLEVEL_OUTOF10: 10
PAINLEVEL_OUTOF10: 8
PAINLEVEL_OUTOF10: 3
PAINLEVEL_OUTOF10: 10
PAINLEVEL_OUTOF10: 8
PAINLEVEL_OUTOF10: 5

## 2022-10-05 ASSESSMENT — PAIN DESCRIPTION - DESCRIPTORS
DESCRIPTORS: ACHING;BURNING
DESCRIPTORS: ACHING;BURNING;DISCOMFORT
DESCRIPTORS: ACHING
DESCRIPTORS: THROBBING
DESCRIPTORS: BURNING

## 2022-10-05 ASSESSMENT — PAIN DESCRIPTION - ONSET: ONSET: GRADUAL

## 2022-10-05 ASSESSMENT — PAIN DESCRIPTION - LOCATION
LOCATION: LEG
LOCATION: LEG
LOCATION: FOOT
LOCATION: FOOT
LOCATION: LEG
LOCATION: LEG

## 2022-10-05 ASSESSMENT — LIFESTYLE VARIABLES
HOW OFTEN DO YOU HAVE A DRINK CONTAINING ALCOHOL: NEVER
HOW MANY STANDARD DRINKS CONTAINING ALCOHOL DO YOU HAVE ON A TYPICAL DAY: PATIENT DOES NOT DRINK

## 2022-10-05 ASSESSMENT — PAIN DESCRIPTION - PAIN TYPE: TYPE: ACUTE PAIN

## 2022-10-05 ASSESSMENT — PAIN DESCRIPTION - FREQUENCY: FREQUENCY: INTERMITTENT

## 2022-10-05 NOTE — PROGRESS NOTES
Hospitalist Progress Note   Admit Date:  10/4/2022  9:06 PM   Name:  Bonny Carballo   Age:  69 y.o. Sex:  female  :  1955   MRN:  [id number]   Room:  [facility]    Presenting Complaint: Back pain and fatigue  Reason(s) for Admission: Cellulitis [L03.90]  JOSE (acute kidney injury) ([facility] [address] [N17.9]  Cellulitis of right lower extremity [L03.115]  Sepsis, due to unspecified organism, unspecified whether acute organ dysfunction present [facility]) [A41.9]  Bacteremia due to Gram-negative bacteria [facility] Course & Interval History:   69year old female with chronic pain status post neurostimulator placement on 22, hypertension, diabetes type 2, fibromyalgia presented to emergency room on 10/5/22 with chief complaint of generalized body ache, generalized weakness, poor appetite for past few days duration with dalia falls. Patient reported low-grade fever at home, history of right lower extremity redness involving just above ankle for past few days duration. Further work-up in emergency room shows evidence of elevated white count, procalcitonin is significantly elevated at 15. Patient was initiated on broad-spectrum antibiotics, chest x-ray did not show any evidence of infiltrates, urinalysis does not show evidence of UTI. Subjective/24hr Events (10/05/22): She is moaning and complaining of worsening neck and back pain. Says she doesn't feel right. Denies CP/SOB. Denies abdominal pain. Denies N/V/D. Denies leg pain. States erythema is the same for a while. Assessment & Plan:     Principal Problem:    Bacteremia due to Gram-positive bacteria  - Blood cultures from 10/4 with GPC  - Unsure etiology  - Continue Zosyn  - Continue Vancomycin  - ? Cellulitis  - UA unremarkable  - Pain stimulator area not red or tender  - CXR clear  - Procalcitonin 15  - Repeat blood cultures tomorrow AM  - Check ECHO  - Check CT spine w contrast  - Consult ID in setting of recent stimulator placement    Active Problems:    Cellulitis of right lower extremity  - Unsure if true cellulitis  - Has well demarcated lines and patient/ states it's been there a while  - Had right second to wound - followed by podiatry as OP  - Continue Zosyn      HTN (hypertension)  - All BP meds held due to hypotension in ER  - BP now up  - Restart Coreg now  - Restart other BP meds as appropriate      Type 2 diabetes mellitus without complication (HCC)  - Stable  - Continue Humalog SSI      Chronic pain syndrome  - States that pain is a lot worse over past 4 weeks  - S/P spinal stimulator on 6/23/22  - Continue home Percocet 10  - Add Dilaudid PRN      S/P insertion of spinal cord stimulator      Opioid dependence ([address]  - Be mindful of drowsiness and respiratory depression      Fibromyalgia      Generalized anxiety disorder      HEIDY (obstructive sleep apnea)      Class 1 Obesity    Diet:  ADULT DIET; Regular  DVT PPx: Lovenox  Code status: Full Code    Hospital Problems:  Principal Problem:    Bacteremia due to Gram-negative bacteria  Active Problems:    Cellulitis of right lower extremity    HTN (hypertension)    Paroxysmal atrial fibrillation (HCC)    Type 2 diabetes mellitus without complication (HCC)    Chronic pain syndrome    S/P insertion of spinal cord stimulator    Opioid dependence (HCC)    Fibromyalgia    Generalized anxiety disorder    HEIDY (obstructive sleep apnea)    Primary hypothyroidism    Obesity  Resolved Problems:    * No resolved hospital problems.  *      Discharge Plan:     Location: [facility]  Days: 3-4  PPD Ordered: 10/5/22      Objective:   Patient Vitals for the past 24 hrs:   Temp Pulse Resp BP SpO2   10/05/22 1051 98.4 °F (36.9 °C) 84 16 138/66 100 %   10/05/22 0735 97.5 °F (36.4 °C) 77 18 124/66 94 %   10/05/22 0354 99.1 °F (37.3 °C) 87 -- (!) 106/56 91 %   10/05/22 0324 -- 90 18 -- 98 %   10/05/22 0259 -- 90 20 (!) 116/55 92 %   10/05/22 0230 -- 94 16 (!) 118/56 93 %   10/05/22 0202 -- 97 22 -- 95 %   10/05/22 0201 -- 99 -- (!) 175/78 95 %   10/05/22 0100 100.1 °F (37.8 °C) 94 23 (!) 143/87 95 %   10/05/22 0047 -- 83 26 -- 96 %   10/05/22 0030 -- 91 18 (!) 140/61 --   10/05/22 0025 -- -- -- -- 96 %   10/05/22 0004 -- 89 16 (!) 132/58 97 %   10/04/22 2100 98 °F (36.7 °C) 79 20 139/81 98 %       Oxygen Therapy  SpO2: 100 %  Pulse Oximetry Type: Intermittent  Pulse via Oximetry: 94 beats per minute  O2 Device: None (Room air)    Estimated body mass index is 32.11 kg/m² as calculated from the following:    Height as of this encounter: 5' 7\" (1.702 m). Weight as of this encounter: 205 lb (93 kg). No intake or output data in the 24 hours ending 10/05/22 1509      Physical Exam:   Blood pressure 138/66, pulse 84, temperature 98.4 °F (36.9 °C), temperature source Oral, resp. rate 16, height 5' 7\" (1.702 m), weight 205 lb (93 kg), SpO2 100 %. General:    Well nourished. In moderate distress, moaning  Head:  Normocephalic, atraumatic  Eyes:  Sclerae appear normal.  Pupils equally round. ENT:  Nares appear normal, no drainage. Moist oral mucosa  Neck:  No restricted ROM. Trachea midline   CV:   RRR. No m/r/g. No jugular venous distension. Lungs:   CTAB. No wheezing, rhonchi, or rales. Respirations even, unlabored  Abdomen: Bowel sounds present. Soft, nontender, nondistended. Extremities: No cyanosis or clubbing. No edema  Skin:     RLE with well demarcated area of erythema. Right second toe with wound. Warm and dry. Neuro:  CN II-XII grossly intact. Sensation intact. A&Ox3  Psych:  Normal mood and affect.       I have reviewed ordered lab tests and independently visualized imaging below:    Recent Labs:  Recent Results (from the past 48 hour(s))   Comprehensive Metabolic Panel    Collection Time: 10/04/22 10:07 PM   Result Value Ref Range    Sodium 131 (L) 136 - 145 mmol/L    Potassium 4.0 3.5 - 5.1 mmol/L    Chloride 96 (L) 101 - 110 mmol/L    CO2 25 21 - 32 mmol/L    Anion Gap 10 4 - 13 mmol/L    Glucose 190 (H) 65 - 100 mg/dL    BUN 54 (H) 8 - 23 MG/DL    Creatinine 1.45 (H) 0.6 - 1.0 MG/DL    Est, Glom Filt Rate 39 (L) >60 ml/min/1.73m2    Calcium 9.0 8.3 - 10.4 MG/DL    Total Bilirubin 1.0 0.2 - 1.1 MG/DL    ALT 27 12 - 65 U/L    AST 37 15 - 37 U/L    Alk Phosphatase 84 50 - 130 U/L    Total Protein 6.7 6.3 - 8.2 g/dL    Albumin 2.8 (L) 3.2 - 4.6 g/dL    Globulin 3.9 (H) 2.3 - 3.5 g/dL    Albumin/Globulin Ratio 0.7 (L) 1.2 - 3.5     Blood Culture 1    Collection Time: 10/04/22 10:07 PM    Specimen: Blood   Result Value Ref Range    Special Requests NO SPECIAL REQUESTS  LEFT  Antecubital        Gram stain AEROBIC AND ANAEROBIC BOTTLES      Gram stain GRAM POSITIVE COCCI      Gram stain        RESULTS VERIFIED, PHONED TO AND READ BACK BY Emerick Essex RN 3ORTHO AT 1121 ON 10/5/22 HA    Culture CULTURE IN PROGRESS,FURTHER UPDATES TO FOLLOW     CBC with Auto Differential    Collection Time: 10/04/22 10:07 PM   Result Value Ref Range    WBC 12.6 (H) 4.3 - 11.1 K/uL    RBC 3.87 (L) 4.05 - 5.2 M/uL    Hemoglobin 11.6 (L) 11.7 - 15.4 g/dL    Hematocrit 35.7 (L) 35.8 - 46.3 %    MCV 92.2 79.6 - 97.8 FL    MCH 30.0 26.1 - 32.9 PG    MCHC 32.5 31.4 - 35.0 g/dL    RDW 13.8 11.9 - 14.6 %    Platelets 031 (L) 166 - 450 K/uL    MPV 11.1 9.4 - 12.3 FL    nRBC 0.00 0.0 - 0.2 K/uL    Seg Neutrophils 77 (H) 47 - 75 %    Bands 17 (H) 0 - 6 %    Lymphocytes 5 (L) 16 - 44 %    Monocytes 1 (L) 3 - 9 %    Segs Absolute 11.9 (H) 1.7 - 8.2 K/UL    Absolute Lymph # 0.6 0.5 - 4.6 K/UL    Absolute Mono # 0.1 0.1 - 1.3 K/UL    RBC Comment NORMOCYTIC/NORMOCHROMIC      WBC Comment RARE      Platelet Comment ADEQUATE      Differential Type AUTOMATED     Lactate, Sepsis    Collection Time: 10/04/22 10:07 PM   Result Value Ref Range    Lactic Acid, Sepsis 1.3 0.5 - 2.0 MMOL/L   Procalcitonin    Collection Time: 10/04/22 10:07 PM   Result Value Ref Range    Procalcitonin 15.70 (H) 0.00 - 0.49 ng/mL   Rapid influenza A/B antigens    Collection Time: 10/04/22 10:07 PM    Specimen: Nasal Washing   Result Value Ref Range    Influenza A Ag Negative NEG      Influenza B Ag Negative NEG      Comments HIDE     Source Nasopharyngeal     COVID-19, Rapid    Collection Time: 10/04/22 10:07 PM    Specimen: Nasopharyngeal   Result Value Ref Range    Source Nasopharyngeal      SARS-CoV-2, Rapid Not detected NOTD     Blood Culture 2    Collection Time: 10/04/22 10:12 PM    Specimen: Blood   Result Value Ref Range    Special Requests NO SPECIAL REQUESTS  RIGHT  ARM        Gram stain AEROBIC AND ANAEROBIC BOTTLES      Gram stain GRAM POSITIVE COCCI      Gram stain        RESULTS VERIFIED, PHONED TO AND READ BACK BY Nkechi José RN 3ORTHO AT 7694 ON 10/5/22 HA    Culture CULTURE IN PROGRESS,FURTHER UPDATES TO FOLLOW     Urinalysis w rflx microscopic    Collection Time: 10/04/22 10:12 PM   Result Value Ref Range    Color, UA YELLOW/STRAW      Appearance CLOUDY      Specific [place], UA 1.016 1.001 - 1.023      pH, Urine 5.5 5.0 - 9.0      Protein, UA 30 (A) NEG mg/dL    Glucose, UA Negative mg/dL    Ketones, Urine 15 (A) NEG mg/dL    Bilirubin Urine Negative NEG      Blood, Urine MODERATE (A) NEG      Urobilinogen, Urine 1.0 0.2 - 1.0 EU/dL    Nitrite, Urine Negative NEG      Leukocyte Esterase, Urine TRACE (A) NEG      WBC, UA 0-3 0 /hpf    RBC, UA 0-3 0 /hpf    Epithelial Cells UA 3-5 0 /hpf    BACTERIA, URINE TRACE 0 /hpf    Mucus, UA 1+ (H) 0 /lpf   Culture, Urine    Collection Time: 10/04/22 10:12 PM    Specimen: Urine, clean catch    URINE   Result Value Ref Range    Special Requests NO SPECIAL REQUESTS      Culture        No growth after short period of incubation. Further results to follow after overnight incubation.    EKG 12 Lead    Collection Time: 10/04/22 10:19 PM   Result Value Ref Range    Ventricular Rate 78 BPM    Atrial Rate 78 BPM    P-R Interval 154 ms    QRS Duration 92 ms    Q-T Interval 392 ms    QTc Calculation (Bazett) 446 ms    P Axis -8 degrees    R Axis 0 degrees    T Axis -11 degrees    Diagnosis Normal sinus rhythm    POCT Glucose    Collection Time: 10/05/22 10:50 AM   Result Value Ref Range    POC Glucose 137 (H) 65 - 100 mg/dL    Performed by: Lila        Other Studies:  CT ABDOMEN PELVIS W IV CONTRAST Additional Contrast? None    Result Date: 10/5/2022  EXAM: CT ABDOMEN PELVIS W IV CONTRAST HISTORY: sepsis. TECHNIQUE: Axial images of the abdomen and pelvis were performed following the administration of intravenous contrast. Images were obtained in the axial plane and coronal reformatted images were created and reviewed. Dose reduction technique used: Automated exposure control/Adjustment of the mA and/or kV according to patient size/Use of iterative reconstruction technique. 100 mL of [id number]-370 were utilized. COMPARISON: CT chest dated 9/12/2009. FINDINGS: The visualized lung bases show mild dependent atelectasis. The visualized mediastinum appears unremarkable. The liver, spleen, adrenal glands, and kidneys are within normal limits. The bladder appears grossly normal. The pancreas appears atrophic. The gallbladder is dilated. It measures up to 6.1 cm transversely. It contains at least one radiopaque stone. Distal esophagus appears unremarkable. The patient is status post gastric bypass. Small and large bowel show no evidence of obstruction. A structure felt to represent the appendix is seen in the right lower quadrant and appears grossly normal. No evidence of free fluid or free air. No pathologic adenopathy. No abdominal aortic aneurysm. Osseous structures show no evidence of acute fracture or suspicious lesion. There is a small fat-containing umbilical hernia. Spinal stimulator leads are in place at the level of the thoracic spine. There is a small fluid collection surrounding the subcutaneous portion. The gallbladder is dilated. It measures up to 6.1 cm transversely. It contains at least one radiopaque stone.  There is no appreciable pericholecystic fluid or obvious wall thickening. Spinal stimulator leads are in place at the level of the thoracic spine. There is a small fluid collection surrounding the subcutaneous portion. The patient is status post gastric bypass. XR CHEST PORTABLE    Result Date: 10/4/2022  CHEST X-RAY, single portable view  10/4/2022 History: Fall. Technique: Single frontal view of the chest. Comparison: Chest x-ray 12/14/2010 Findings: Today's study is limited given that the patient is imaged obliqued to the right. The cardiac silhouette is not clearly enlarged given AP technique of this exam.  There is an asymmetric appearance of the left hilum which may be related to the patient's positioning. Similar prominence has been seen such as on a prior comparison dated 12/11/2010 therefore this is not highly suspicious. The lungs are expanded without evidence for pneumothorax. No consolidative airspace process or pleural effusion is seen. 1.  Limited study without significant acute changes evident.        Current Meds:  Current Facility-Administered Medications   Medication Dose Route Frequency    sodium chloride flush 0.9 % injection 5-40 mL  5-40 mL IntraVENous 2 times per day    sodium chloride flush 0.9 % injection 5-40 mL  5-40 mL IntraVENous PRN    0.9 % sodium chloride infusion   IntraVENous PRN    enoxaparin (LOVENOX) injection 40 mg  40 mg SubCUTAneous Daily    ondansetron (ZOFRAN-ODT) disintegrating tablet 4 mg  4 mg Oral Q8H PRN    Or    ondansetron (ZOFRAN) injection 4 mg  4 mg IntraVENous Q6H PRN    polyethylene glycol (GLYCOLAX) packet 17 g  17 g Oral Daily PRN    acetaminophen (TYLENOL) tablet 650 mg  650 mg Oral Q6H PRN    Or    acetaminophen (TYLENOL) suppository 650 mg  650 mg Rectal Q6H PRN    piperacillin-tazobactam (ZOSYN) 3,375 mg in sodium chloride 0.9 % 50 mL IVPB (mini-bag)  3,375 mg IntraVENous Q8H    insulin lispro (HUMALOG) injection vial 0-8 Units  0-8 Units SubCUTAneous TID     insulin lispro (HUMALOG) injection vial 0-4 Units  0-4 Units SubCUTAneous Nightly    glucose chewable tablet 16 g  4 tablet Oral PRN    dextrose bolus 10% 125 mL  125 mL IntraVENous PRN    Or    dextrose bolus 10% 250 mL  250 mL IntraVENous PRN    glucagon (rDNA) injection 1 mg  1 mg SubCUTAneous PRN    dextrose 10 % infusion   IntraVENous Continuous PRN    aspirin chewable tablet 81 mg  81 mg Oral Nightly    DULoxetine (CYMBALTA) extended release capsule 30 mg  30 mg Oral Nightly    traZODone (DESYREL) tablet 50 mg  50 mg Oral Nightly    topiramate (TOPAMAX) tablet 100 mg  100 mg Oral BID    atorvastatin (LIPITOR) tablet 10 mg  10 mg Oral Daily    oxyCODONE-acetaminophen (PERCOCET)  MG per tablet 1 tablet  1 tablet Oral Q6H PRN    tiZANidine (ZANAFLEX) tablet 4 mg  4 mg Oral Q12H PRN    HYDROmorphone HCl PF (DILAUDID) injection 0.5 mg  0.5 mg IntraVENous Q3H PRN       Signed:  Diana Rascon DO  10/5/22

## 2022-10-05 NOTE — H&P
Hospitalist History and Physical   Admit Date:  10/4/2022  9:06 PM   Name:  Kathy Flores   Age:  69 y.o. Sex:  female  :  1955   MRN:  [id number]   Room:  [facility]    Presenting Complaint: Fall     Reason(s) for Admission: Cellulitis [L03.90]  JOSE (acute kidney injury) ([facility] [N17.9]  Cellulitis of right lower extremity [L03.115]  Sepsis, due to unspecified organism, unspecified whether acute organ dysfunction present ([facility] [A41.9]     History of Present Illness:   Kathy Flores is a 69 y.o. female with medical history of chronic pain, status post neurostimulator placement, hypertension, diabetes type 2, fibromyalgia presented to emergency room with chief complaint of generalized body ache, generalized weakness, poor appetite for past few days duration. Patient reports history of fall for past few days. Patient reports low-grade fever at home, history of right lower extremity redness involving just above ankle for past few days duration. Further work-up in emergency room shows evidence of elevated white count, procalcitonin is significantly elevated at 15. Patient was initiated on broad-spectrum antibiotics, chest x-ray did not show any evidence of infiltrates, urinalysis shows evidence of UTI. Emergency room physician requested admission of this patient for further evaluation management of cellulitis. Patient looks drowsy but requesting for pain medication at this time. Review of Systems:  10 systems reviewed and negative except as noted in HPI. Assessment & Plan:     Principal Problem:    Cellulitis: Initiated on broad-spectrum antibiotics with vancomycin, Zosyn will follow blood cultures. Active Problems:    HTN (hypertension): Continue on home medications. Type 2 diabetes mellitus without complication ([facility]): Placed on sliding scale insulin, monitor blood sugars, hypoglycemia protocol initiated.       Obesity: Dietary modifications advised        Anticipated discharge needs: Home with family    Patient not able to tell home medications at this time, reconciled available medications. Diet: ADULT DIET; Regular  VTE ppx: Lovenox  Code status: Full Code    Hospital Problems:  Principal Problem:    Cellulitis  Active Problems:    HTN (hypertension)    Type 2 diabetes mellitus without complication ([address]    Obesity  Resolved Problems:    * No resolved hospital problems. *       Past History:     Past Medical History:   Diagnosis Date    Arrhythmia     Arthritis     Atrial septal defect 10/26/2016    Small PFO. Followed by Dr. Mendoza [place]    Autoimmune disease [facility])     fibromyalgia    Chronic pain     fibromyalgia; chronic back pain     Diarrhea 12/15/2010    Edema 10/26/2016    Fibromyalgia 12/11/2010    GERD (gastroesophageal reflux disease)     HTN (hypertension) 12/11/2010    Hypercapnic acidosis 12/11/2010    Hyperglycemia 12/11/2010    Major depressive disorder, recurrent ([address] 03/13/2017    Obesity, Class III, BMI 40-49.9 (morbid obesity) ([address]     also type II diabetes and sleep apnea    Opioid dependence ([address] 12/11/2010    Palpitations 10/26/2016    Dilcia Waters returns for followup. We did not detect any arrhythmias on her event recorder. .  She takes her blood pressure with a wrist cuff and occasionally notes high pressures and heart rates in the 190. Reviewing her old records she did have an EP study 2000 this showed easily inducible atrial flutter was apparently on Rythmol for a period of time.   She now relates that she was hospitalized after     Paroxysmal atrial fibrillation ([address] 10/26/2016    Preop cardiovascular exam 01/23/2017    Primary hypothyroidism 12/13/2010    Prolonged emergence from general anesthesia     slow to wake up with hysterectomy    Psychiatric disorder     depression, anxiety    Respiratory failure ([address] 12/11/2010    Stroke ([address] 2010    was told had probable tia x2    Thyroid disease     hypo    TIA (transient ischemic attack) 10/26/2016    Type 2 diabetes mellitus without complication ([facility] 85/95/6204    insulin reliant; AVG -130; s.s. of hypoglycemia @ 70s; last A1C 6.8 on 06/16/2022    Typical atrial flutter ([facility] 01/23/2017    Unspecified adverse effect of anesthesia     pt had difficulty waking up once per pt    Unspecified sleep apnea     uses 3 Liters oxygen nightly    Weakness 12/13/2010       Past Surgical History:   Procedure Laterality Date    CARDIAC CATHETERIZATION      COLONOSCOPY N/A 5/29/2018    COLONOSCOPY/ 39 performed by Ashley Nair MD at [facility] ENDOSCOPY    COLONOSCOPY N/A 12/5/2017    COLONOSCOPY /   BMI 44 performed by Ashley aNir MD at [address] [place] [address] N/A 6/23/2022    T8-9 THORACIC LAMINOTOMY AND PLACEMENT OF SPINAL CORD STIMULATOR LEAD AND BATTERY/ANS performed by Jordan Retana MD at [facility] MAIN OR    STELLA AND BSO (CERVIX REMOVED)          Social History     Tobacco Use    Smoking status: Never    Smokeless tobacco: Never   Substance Use Topics    Alcohol use: No      Social History     Substance and Sexual Activity   Drug Use No       Family History   Problem Relation Age of Onset    Heart Attack Father     Heart Disease Father     Post-op Nausea/Vomiting Mother     Cancer Mother         skin        Immunization History   Administered Date(s) Administered    COVID-19, PFIZER PURPLE top, DILUTE for use, (age 15 y+), 30mcg/0.3mL 03/01/2021, 03/29/2021, 11/23/2021     Allergies   Allergen Reactions    Latex Rash     itching    Benzodiazepines Other (See Comments)     Dr. Maren Goldberg didn't wont medication combined with other medications pt was taking    Diltiazem Other (See Comments)     Other reaction(s): Headache-Intolerance    Nsaids Other (See Comments) and Swelling     Other reaction(s):  Other- (not listed) - Allergy  Elevated Blood Pressure; leg edema  Elevated Blood Pressure; leg edema      Amlodipine Other (See Comments)     Other reaction(s): Headache-Intolerance Prior to Admit Medications:  Current Outpatient Medications   Medication Instructions    ascorbic acid (VITAMIN C) 250 MG tablet Oral, DAILY    aspirin 81 mg, Oral, Nightly, Preventative Only-pt denies heart attack, stents, blood clots. Per pt possibly had a TIA in the past    carvedilol (COREG) 12.5 mg, Oral, AS NEEDED, Per pt depends on HR    carvedilol (COREG) 6.25 mg, Oral, AS NEEDED, Per pt depends on HR     celecoxib (CELEBREX) 200 mg, Oral, 2 TIMES DAILY    chlorthalidone (HYGROTON) 25 mg, Oral, AS NEEDED    coenzyme Q10 200 mg, Oral, DAILY    DULoxetine (CYMBALTA) 30 mg, Oral, Nightly    DULoxetine (CYMBALTA) 60 mg, Oral, DAILY    estradiol (ESTRACE) 0.5 mg, Oral, DAILY    ezetimibe (ZETIA) 10 mg, Oral, DAILY    ferrous sulfate (IRON 325) 325 mg, Oral, Per pt takes 3-4 times a week    gabapentin (NEURONTIN) 300 mg, Oral, 3 TIMES DAILY    hydrALAZINE (APRESOLINE) 25 mg, Oral, EVERY 6 HOURS    insulin NPH (HUMULIN N;NOVOLIN N) 10 Units, SubCUTAneous, EVERY MORNING    insulin regular (HUMULIN R;NOVOLIN R) 100 UNIT/ML injection SubCUTAneous, Sliding scale    ketoconazole (NIZORAL) 2 % cream Topical, AS NEEDED    levothyroxine (SYNTHROID) 125 MCG tablet Oral, DAILY BEFORE BREAKFAST    Lidocaine HCl 2.75 % LOTN Topical, AS NEEDED    lisinopril (PRINIVIL;ZESTRIL) 40 mg, Oral, Nightly    loratadine (CLARITIN) 10 MG tablet Strength: 10 mg; Form: tablet; SIG: take 1 tab(s) orally once a day    magnesium oxide (MAG-OX) 500 mg, Oral, 2 TIMES DAILY    omeprazole (PRILOSEC) 20 mg, Oral, 2 TIMES DAILY    ondansetron (ZOFRAN) 4 mg, Oral, EVERY 8 HOURS PRN    ondansetron (ZOFRAN-ODT) 4 MG disintegrating tablet No dose, route, or frequency recorded. oxyCODONE-acetaminophen (PERCOCET)  MG per tablet No dose, route, or frequency recorded.     pitavastatin (LIVALO) 2 mg, Oral, DAILY    spironolactone (ALDACTONE) 25 MG tablet spironolactone 25 mg tablet    SUMAtriptan (IMITREX) 100 MG tablet sumatriptan 100 mg tablet tiZANidine (ZANAFLEX) 4 mg, Oral, AS NEEDED    topiramate (TOPAMAX) 100 MG tablet Oral, 2 TIMES DAILY    traZODone (DESYREL) 50 mg, Oral, NIGHTLY    vitamin D3 (CHOLECALCIFEROL) 125 MCG (5000 UT) TABS tablet Oral, DAILY         Objective:   Patient Vitals for the past 24 hrs:   Temp Pulse Resp BP SpO2   10/05/22 0354 99.1 °F (37.3 °C) 87 -- (!) 106/56 91 %   10/05/22 0324 -- 90 18 -- 98 %   10/05/22 0259 -- 90 20 (!) 116/55 92 %   10/05/22 0230 -- 94 16 (!) 118/56 93 %   10/05/22 0202 -- 97 22 -- 95 %   10/05/22 0201 -- 99 -- (!) 175/78 95 %   10/05/22 0100 100.1 °F (37.8 °C) 94 23 (!) 143/87 95 %   10/05/22 0047 -- 83 26 -- 96 %   10/05/22 0030 -- 91 18 (!) 140/61 --   10/05/22 0025 -- -- -- -- 96 %   10/05/22 0004 -- 89 16 (!) 132/58 97 %   10/04/22 2100 98 °F (36.7 °C) 79 20 139/81 98 %       Oxygen Therapy  SpO2: 91 %  Pulse Oximetry Type: Intermittent  Pulse via Oximetry: 94 beats per minute  O2 Device: None (Room air)    Estimated body mass index is 32.11 kg/m² as calculated from the following:    Height as of this encounter: 5' 7\" (1.702 m). Weight as of this encounter: 205 lb (93 kg). No intake or output data in the 24 hours ending 10/05/22 0410      Physical Exam:    Blood pressure (!) 106/56, pulse 87, temperature 99.1 °F (37.3 °C), resp. rate 18, height 5' 7\" (1.702 m), weight 205 lb (93 kg), SpO2 91 %. General:    Well nourished. Head:  Normocephalic, atraumatic  Eyes:  Sclerae appear normal.  Pupils equally round. ENT:  Nares appear normal, no drainage. Moist oral mucosa  Neck:  No restricted ROM. Trachea midline   CV:   RRR. No m/r/g. No jugular venous distension. Lungs:   CTAB. No wheezing, rhonchi, or rales. Symmetric expansion. Abdomen: Bowel sounds present. Soft, nontender, nondistended. Extremities: Redness involving right lower extremity. Skin:     No rashes and normal coloration. Warm and dry. Neuro:  CN II-XII grossly intact. Sensation intact. Time: 10/04/22 10:07 PM    Specimen: Nasal Washing   Result Value Ref Range    Influenza A Ag Negative NEG      Influenza B Ag Negative NEG      Comments HIDE     Source Nasopharyngeal     COVID-19, Rapid    Collection Time: 10/04/22 10:07 PM    Specimen: Nasopharyngeal   Result Value Ref Range    Source Nasopharyngeal      SARS-CoV-2, Rapid Not detected NOTD     Urinalysis w rflx microscopic    Collection Time: 10/04/22 10:12 PM   Result Value Ref Range    Color, UA YELLOW/STRAW      Appearance CLOUDY      Specific [place], UA 1.016 1.001 - 1.023      pH, Urine 5.5 5.0 - 9.0      Protein, UA 30 (A) NEG mg/dL    Glucose, UA Negative mg/dL    Ketones, Urine 15 (A) NEG mg/dL    Bilirubin Urine Negative NEG      Blood, Urine MODERATE (A) NEG      Urobilinogen, Urine 1.0 0.2 - 1.0 EU/dL    Nitrite, Urine Negative NEG      Leukocyte Esterase, Urine TRACE (A) NEG      WBC, UA 0-3 0 /hpf    RBC, UA 0-3 0 /hpf    Epithelial Cells UA 3-5 0 /hpf    BACTERIA, URINE TRACE 0 /hpf    Mucus, UA 1+ (H) 0 /lpf   EKG 12 Lead    Collection Time: 10/04/22 10:19 PM   Result Value Ref Range    Ventricular Rate 78 BPM    Atrial Rate 78 BPM    P-R Interval 154 ms    QRS Duration 92 ms    Q-T Interval 392 ms    QTc Calculation (Bazett) 446 ms    P Axis -8 degrees    R Axis 0 degrees    T Axis -11 degrees    Diagnosis Normal sinus rhythm        I have personally reviewed imaging studies showing:  CT ABDOMEN PELVIS W IV CONTRAST Additional Contrast? None    Result Date: 10/5/2022  EXAM: CT ABDOMEN PELVIS W IV CONTRAST HISTORY: sepsis. TECHNIQUE: Axial images of the abdomen and pelvis were performed following the administration of intravenous contrast. Images were obtained in the axial plane and coronal reformatted images were created and reviewed. Dose reduction technique used: Automated exposure control/Adjustment of the mA and/or kV according to patient size/Use of iterative reconstruction technique. 100 mL of Isovue-370 were utilized. COMPARISON: CT chest dated 9/12/2009. FINDINGS: The visualized lung bases show mild dependent atelectasis. The visualized mediastinum appears unremarkable. The liver, spleen, adrenal glands, and kidneys are within normal limits. The bladder appears grossly normal. The pancreas appears atrophic. The gallbladder is dilated. It measures up to 6.1 cm transversely. It contains at least one radiopaque stone. Distal esophagus appears unremarkable. The patient is status post gastric bypass. Small and large bowel show no evidence of obstruction. A structure felt to represent the appendix is seen in the right lower quadrant and appears grossly normal. No evidence of free fluid or free air. No pathologic adenopathy. No abdominal aortic aneurysm. Osseous structures show no evidence of acute fracture or suspicious lesion. There is a small fat-containing umbilical hernia. Spinal stimulator leads are in place at the level of the thoracic spine. There is a small fluid collection surrounding the subcutaneous portion. The gallbladder is dilated. It measures up to 6.1 cm transversely. It contains at least one radiopaque stone. There is no appreciable pericholecystic fluid or obvious wall thickening. Spinal stimulator leads are in place at the level of the thoracic spine. There is a small fluid collection surrounding the subcutaneous portion. The patient is status post gastric bypass. XR CHEST PORTABLE    Result Date: 10/4/2022  CHEST X-RAY, single portable view  10/4/2022 History: Fall. Technique: Single frontal view of the chest. Comparison: Chest x-ray 12/14/2010 Findings: Today's study is limited given that the patient is imaged obliqued to the right. The cardiac silhouette is not clearly enlarged given AP technique of this exam.  There is an asymmetric appearance of the left hilum which may be related to the patient's positioning.   Similar prominence has been seen such as on a prior comparison dated 12/11/2010 therefore this is not highly suspicious. The lungs are expanded without evidence for pneumothorax. No consolidative airspace process or pleural effusion is seen. 1.  Limited study without significant acute changes evident. Echocardiogram:  No results found for this or any previous visit.         Orders Placed This Encounter   Medications    lactated ringers bolus    morphine injection 4 mg    ondansetron (ZOFRAN) injection 4 mg    piperacillin-tazobactam (ZOSYN) 4,500 mg in sodium chloride 0.9 % 100 mL IVPB (mini-bag)     Order Specific Question:   Antimicrobial Indications     Answer:   Sepsis of Unknown Etiology    vancomycin (VANCOCIN) 2000 mg in 0.9% sodium chloride 500 mL IVPB     Order Specific Question:   Antimicrobial Indications     Answer:   Sepsis of Unknown Etiology    morphine injection 4 mg    acetaminophen (TYLENOL) tablet 1,000 mg    oxyCODONE (ROXICODONE) immediate release tablet 10 mg    sodium chloride flush 0.9 % injection 5-40 mL    sodium chloride flush 0.9 % injection 5-40 mL    0.9 % sodium chloride infusion    enoxaparin (LOVENOX) injection 40 mg     Order Specific Question:   Indication of Use     Answer:   Prophylaxis-DVT/PE    OR Linked Order Group     ondansetron (ZOFRAN-ODT) disintegrating tablet 4 mg     ondansetron (ZOFRAN) injection 4 mg    polyethylene glycol (GLYCOLAX) packet 17 g    OR Linked Order Group     acetaminophen (TYLENOL) tablet 650 mg     acetaminophen (TYLENOL) suppository 650 mg    DISCONTD: piperacillin-tazobactam (ZOSYN) 3,375 mg in sodium chloride 0.9 % 50 mL IVPB (mini-bag)     Order Specific Question:   Antimicrobial Indications     Answer:   Skin and Soft Tissue Infection     Order Specific Question:   Skin duration of therapy     Answer:   7 days    piperacillin-tazobactam (ZOSYN) 3,375 mg in sodium chloride 0.9 % 50 mL IVPB (mini-bag)     Order Specific Question:   Antimicrobial Indications     Answer:   Skin and Soft Tissue Infection     Order Specific Question:   Skin duration of therapy     Answer:   7 days    vancomycin (VANCOCIN) 1250 mg in sodium chloride 0.9% 250 mL IVPB     Order Specific Question:   Antimicrobial Indications     Answer:   Sepsis of Unknown Etiology     Order Specific Question:   Sepsis duration of therapy     Answer:   7 days    insulin lispro (HUMALOG) injection vial 0-8 Units    insulin lispro (HUMALOG) injection vial 0-4 Units    glucose chewable tablet 16 g    OR Linked Order Group     dextrose bolus 10% 125 mL     dextrose bolus 10% 250 mL    glucagon (rDNA) injection 1 mg    dextrose 10 % infusion    aspirin chewable tablet 81 mg    DULoxetine (CYMBALTA) extended release capsule 30 mg    tiZANidine (ZANAFLEX) tablet 4 mg    traZODone (DESYREL) tablet 50 mg    topiramate (TOPAMAX) tablet 100 mg    atorvastatin (LIPITOR) tablet 10 mg    oxyCODONE-acetaminophen (PERCOCET)  MG per tablet 1 tablet         Signed:  Mariel Yo MD    Part of this note may have been written by using a voice dictation software. The note has been proof read but may still contain some grammatical/other typographical errors.

## 2022-10-05 NOTE — PROGRESS NOTES
ACUTE PHYSICAL THERAPY GOALS:   (Developed with and agreed upon by patient and/or caregiver.)  STG:  (1.)Ms. Anjali Torres will move from supine to sit and sit to supine  with MINIMAL ASSIST within 5 treatment day(s). (2.)Ms. Anjali Torres will transfer from bed to chair and chair to bed with MINIMAL ASSIST using the least restrictive device within 5 treatment day(s). (3.)Ms. Anjali Torres will ambulate with MINIMAL ASSIST for 10 feet with the least restrictive device within 5 treatment day(s). LTG:  (1.)Ms. Anjali Torres will move from supine to sit and sit to supine  in bed with STAND BY ASSIST within 10 treatment day(s). (2.)Ms. Anjali Torres will transfer from bed to chair and chair to bed with STAND BY ASSIST using the least restrictive device within 10 treatment day(s). (3.)Ms. Anjali Torres will ambulate with STAND BY ASSIST for 50 feet with the least restrictive device within 10 treatment day(s). ________________________________________________________________________________________________      PHYSICAL THERAPY Initial Assessment and PM  (Link to Caseload Tracking: PT Visit Days : 1  Acknowledge Orders  Time In/Out  PT Charge Capture  Rehab Caseload Tracker    Harlen Simmonds is a 69 y.o. female   PRIMARY DIAGNOSIS: Bacteremia due to Gram-positive bacteria  Cellulitis [L03.90]  JOSE (acute kidney injury) ([facility] [address] [N17.9]  Cellulitis of right lower extremity [L03.115]  Sepsis, due to unspecified organism, unspecified whether acute organ dysfunction present ([facility] [address] [A41.9]  Bacteremia due to Gram-negative bacteria [R78.81]       Reason for Referral: Generalized Muscle Weakness (M62.81)  Other lack of cordination (R27.8)  Difficulty in walking, Not elsewhere classified (R26.2)  History of falling (Z91.81)  Inpatient: Payor: MEDICARE / Plan: MEDICARE PART A AND B / Product Type: *No Product type* /     ASSESSMENT:     REHAB RECOMMENDATIONS:   Recommendation to date pending progress:  Setting: To be determined, likely STR    Equipment:     To Be Determined     ASSESSMENT:  Ms. Anya Yi admitted with above diagnosis. At time of assessment, patient demonstrated confusion, agitation, and poor mobility. Per CNA, patient was more cooperative and assisted with bed mobility earlier in the day. Patient's daughter reports patient is typically independent with gait without an assistive device. She lives with her spouse in a split level home. Patient needs therapy intervention during admission. Unable to get a good picture of patient's mobility today but anticipate she will need STR at d/c-daughter reports she has been to rehab before. Goals set based on patient's PLOF as reported by daughter but hopefully can be progressed quickly as patient able to participate. [facility] [address] [address] AM-PAC 6 Clicks Basic Mobility Inpatient Short Form  AM-PAC Mobility Inpatient   How much difficulty turning over in bed?: A Lot  How much difficulty sitting down on / standing up from a chair with arms?: Unable  How much difficulty moving from lying on back to sitting on side of bed?: A Lot  How much help from another person moving to and from a bed to a chair?: Total  How much help from another person needed to walk in hospital room?: Total  How much help from another person for climbing 3-5 steps with a railing?: Total  AM-PAC Inpatient Mobility Raw Score : 8  AM-PAC Inpatient T-Scale Score : 28.52  Mobility Inpatient CMS 0-100% Score: 86.62  Mobility Inpatient CMS G-Code Modifier : CM    SUBJECTIVE:   Ms. Anya Yi states, \"My neck\".      Social/Functional Lives With: Spouse  Type of Home: House  Home Layout: Multi-level, Bed/Bath upstairs  Home Access: Stairs to enter without rails  Entrance Stairs - Number of Steps: 3  Receives Help From: Family  ADL Assistance: Independent  Homemaking Assistance: Needs assistance  Homemaking Responsibilities: No  Ambulation Assistance: Independent  Active : No (has a license but doesn't drive)  Occupation: Retired    OBJECTIVE:     PAIN: VITALS / O2: PRECAUTION / Tristian Amis / DRAINS:   Pre Treatment:   Pain Assessment: Face, Legs, Activity, Cry, and Consolability (FLACC)      Post Treatment: 4 Vitals        Oxygen      IV    RESTRICTIONS/PRECAUTIONS:  Restrictions/Precautions: Fall Risk                 GROSS EVALUATION: Intact Impaired (Comments):   AROM []  Decreased B LE   PROM []    Strength []  Decreased B LE   Balance []  Impaired   Posture [] Forward Head  Rounded Shoulders   Sensation []     Coordination []   impaired   Tone []     Edema []    Activity Tolerance [] Patient limited by fatigue, Patient limited by pain, Treatment limited secondary to decreased cognition, Treatment limited secondary to agitation    []      COGNITION/  PERCEPTION: Intact Impaired (Comments):   Orientation [] Disoriented to time, Disoriented to situation, Disoriented to place   Vision []     Hearing []     Cognition  []       MOBILITY: I Mod I S SBA CGA Min Mod Max Total  NT x2 Comments:   Bed Mobility    Rolling [] [] [] [] [] [] [] [x] [] [] []    Supine to Sit [] [] [] [] [] [] [] [] [x] [] []    Scooting [] [] [] [] [] [] [] [] [x] [] []    Sit to Supine [] [] [] [] [] [] [] [x] [] [] []    Transfers    Sit to Stand [] [] [] [] [] [] [] [] [] [x] []    Bed to Chair [] [] [] [] [] [] [] [] [] [x] []    Stand to Sit [] [] [] [] [] [] [] [] [] [x] []     [] [] [] [] [] [] [] [] [] [] []    I=Independent, Mod I=Modified Independent, S=Supervision, SBA=Standby Assistance, CGA=Contact Guard Assistance,   Min=Minimal Assistance, Mod=Moderate Assistance, Max=Maximal Assistance, Total=Total Assistance, NT=Not Tested    GAIT: I Mod I S SBA CGA Min Mod Max Total  NT x2 Comments:   Level of Assistance [] [] [] [] [] [] [] [] [] [x] []    Distance   feet    DME N/A    Gait Quality N/A    Weightbearing Status Restrictions/Precautions  Restrictions/Precautions: Fall Risk    Stairs      I=Independent, Mod I=Modified Independent, S=Supervision, SBA=Standby Assistance, CGA=Contact Guard Assistance,   Min=Minimal Assistance, Mod=Moderate Assistance, Max=Maximal Assistance, Total=Total Assistance, NT=Not Tested    PLAN:   FREQUENCY AND DURATION: Daily for duration of hospital stay or until stated goals are met, whichever comes first.    THERAPY PROGNOSIS: Fair    PROBLEM LIST:   (Skilled intervention is medically necessary to address:)  Decreased ADL/Functional Activities  Decreased Activity Tolerance  Decreased AROM/PROM  Decreased Balance  Decreased Cognition  Decreased Coordination  Decreased Gait Ability  Decreased Safety Awareness  Decreased Strength  Decreased Transfer Abilities  Increased Pain INTERVENTIONS PLANNED:   (Benefits and precautions of physical therapy have been discussed with the patient.)  Therapeutic Activity  Therapeutic Exercise/HEP  Neuromuscular Re-education  Gait Training  Education       TREATMENT:   EVALUATION: MODERATE COMPLEXITY: (Untimed Charge)    TREATMENT:   Therapeutic Activity (15 Minutes): Therapeutic activity included Rolling, Supine to Sit, Sit to Supine, and Scooting to improve functional Activity tolerance, Balance, Coordination, Mobility, Strength, and ROM. TREATMENT GRID:  N/A    AFTER TREATMENT PRECAUTIONS: Alarm Activated, Bed, Bed/Chair Locked, Call light within reach, Needs within reach, RN notified, and Visitors at bedside    INTERDISCIPLINARY COLLABORATION:  RN/ PCT    EDUCATION: Education Given To: Patient; Family  Education Provided: Role of Therapy;Plan of Care  Education Method: Verbal  Education Outcome: Continued education needed    TIME IN/OUT:  Time In: [address]  Time Out: [address]  Minutes: [address] PT

## 2022-10-05 NOTE — ED TRIAGE NOTES
Fall Monday, fall today at 4p, has collar on per ems, related to back pain and neck pain, orthostatics noted per ems, dry mucus membranes family coming

## 2022-10-05 NOTE — ED NOTES
TRANSFER - OUT REPORT:    Verbal report given to Juan Lopez RN on Bobby Ascencio  being transferred to [address] [place] [address] for routine progression of patient care       Report consisted of patient's Situation, Background, Assessment and   Recommendations(SBAR). Information from the following report(s) ED SBAR was reviewed with the receiving nurse. Lines:   Peripheral IV 10/04/22 Left Antecubital (Active)   Site Assessment Clean, dry & intact 10/04/22 2232   Line Status Brisk blood return 10/04/22 2232   Line Care Connections checked and tightened 10/04/22 2232   Phlebitis Assessment No symptoms 10/04/22 2232   Infiltration Assessment 0 10/04/22 2232   Dressing Status Clean, dry & intact 10/04/22 2232   Dressing Type Transparent 10/04/22 2232   Dressing Intervention New 10/04/22 2232       Peripheral IV 10/04/22 Right Antecubital (Active)   Site Assessment Clean, dry & intact 10/04/22 2232   Line Status Brisk blood return 10/04/22 2232   Line Care Connections checked and tightened 10/04/22 2232   Phlebitis Assessment No symptoms 10/04/22 2232   Infiltration Assessment 0 10/04/22 2232   Dressing Status Clean, dry & intact 10/04/22 2232   Dressing Type Transparent 10/04/22 2232   Dressing Intervention New 10/04/22 2232        Opportunity for questions and clarification was provided. Patient transported with:  Registered Nurse     Sarah Grover.  Clemente Daniel, [facility]  10/05/22 7029
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
## 2022-10-05 NOTE — ED PROVIDER NOTES
Emergency Department Provider Note                   PCP:                Tawana Parker RN               Age: 69 y.o. Sex: female       ICD-10-CM    1. Sepsis, due to unspecified organism, unspecified whether acute organ dysfunction present ([facility]  A41.9       2. Cellulitis of right lower extremity  L03.115       3. JOSE (acute kidney injury) ([facility]  N17.9           DISPOSITION Decision To Admit 10/05/2022 02:18:10 AM       MDM  Number of Diagnoses or Management Options  JOSE (acute kidney injury) ([facility]  Cellulitis of right lower extremity  Sepsis, due to unspecified organism, unspecified whether acute organ dysfunction present [facility])  Diagnosis management comments: I wore appropriate PPE throughout this patient's ED visit. Ashely Ley MD, 10:14 PM      She has chronic pain with thoracic laminotomy and spinal cord stimulator in addition to fibromyalgia. Possible viral syndrome or other infectious process exacerbating this process. 1:07 AM  Elevated procalcitonin with bandemia and initially no obvious source of infection, so CT ordered. She does have some chronic erythema of her right ankle, but appears to have some early erythema extending up to her knee with warmth. Covered with Zosyn and Vanco.  Pain treated. Patient rolled and there was no other signs of infection to her back, abdomen, or other extremities. No crepitus or signs of necrotizing fasciitis. 2:18 AM  CT shows distended gallbladder without signs of cholecystitis. Discussed with hospitalist for admission.        Amount and/or Complexity of Data Reviewed  Clinical lab tests: ordered and reviewed  Tests in the radiology section of CPT®: reviewed and ordered  Tests in the medicine section of CPT®: reviewed and ordered  Discussion of test results with the performing providers: yes  Decide to obtain previous medical records or to obtain history from someone other than the patient: yes  Obtain history from someone other than the patient: yes  Review and summarize past medical records: yes  Discuss the patient with other providers: yes  Independent visualization of images, tracings, or specimens: yes               Orders Placed This Encounter   Procedures    Blood Culture 1    Blood Culture 2    Rapid influenza A/B antigens    COVID-19, Rapid    Culture, Urine    XR CHEST PORTABLE    CT ABDOMEN PELVIS W IV CONTRAST Additional Contrast? None    Comprehensive Metabolic Panel    CBC with Auto Differential    Lactate, Sepsis    Procalcitonin    Urinalysis w rflx microscopic    Neurologic Status Assessment    Strict intake and output    Vital Signs Per Unit Routine    Vital signs    EKG 12 Lead    Saline lock IV    Urinary catheter straight        Medications   vancomycin (VANCOCIN) 2000 mg in 0.9% sodium chloride 500 mL IVPB (2,000 mg IntraVENous New Bag 10/5/22 0051)   lactated ringers bolus (1,848 mLs IntraVENous New Bag 10/4/22 2233)   morphine injection 4 mg (4 mg IntraVENous Given 10/4/22 2233)   ondansetron (ZOFRAN) injection 4 mg (4 mg IntraVENous Given 10/4/22 2233)   piperacillin-tazobactam (ZOSYN) 4,500 mg in sodium chloride 0.9 % 100 mL IVPB (mini-bag) (0 mg IntraVENous Stopped 10/5/22 0030)   morphine injection 4 mg (4 mg IntraVENous Given 10/5/22 0117)   acetaminophen (TYLENOL) tablet 1,000 mg (1,000 mg Oral Given 10/5/22 0155)       New Prescriptions    No medications on file        Jacky Keita is a 69 y.o. female who presents to the Emergency Department with chief complaint of    Chief Complaint   Patient presents with    Fall      75-year-old female presents with 3 to 4 days of generalized weakness and \"pain all over. \"  She denies cough, chest pain, shortness of breath, abdominal pain, nausea, vomiting, or diarrhea. No pain with urination. She has had low grade fever around 99 and had a negative at home COVID test. She has had a poor appetite and has not been eating or drinking well. She fell yesterday and today due to weakness.   She states she just slid down from her bed onto her buttocks. She did not hit her head. No actual injuries from the fall. She has chronic neck and back pain and has a spinal stimulator. She states she has pain in all of her joints and muscles so was unable to get up after her falls. She had no loss of consciousness. No focal numbness or weakness that is new. No recent changes in medicines. She is followed by pain management and takes oxycodone and Zanaflex regularly. All other systems reviewed and are negative unless otherwise stated in the history of present illness section. Review of Systems   Constitutional:  Positive for fatigue. Negative for fever. HENT:  Negative for facial swelling. Eyes:  Negative for visual disturbance. Respiratory:  Negative for cough and shortness of breath. Cardiovascular:  Negative for chest pain. Gastrointestinal:  Negative for abdominal pain, diarrhea, nausea and vomiting. Genitourinary:  Negative for dysuria. Musculoskeletal:  Positive for arthralgias, back pain, myalgias and neck pain. Negative for joint swelling. Skin:  Negative for rash. Neurological:  Positive for weakness. Negative for speech difficulty and numbness. Psychiatric/Behavioral:  Negative for confusion. All other systems reviewed and are negative. Past Medical History:   Diagnosis Date    Arrhythmia     Arthritis     Atrial septal defect 10/26/2016    Small PFO.  Followed by Dr. Hayes Lopez    Autoimmune disease [facility])     fibromyalgia    Chronic pain     fibromyalgia; chronic back pain     Diarrhea 12/15/2010    Edema 10/26/2016    Fibromyalgia 12/11/2010    GERD (gastroesophageal reflux disease)     HTN (hypertension) 12/11/2010    Hypercapnic acidosis 12/11/2010    Hyperglycemia 12/11/2010    Major depressive disorder, recurrent ([address] 03/13/2017    Obesity, Class III, BMI 40-49.9 (morbid obesity) ([address]     also type II diabetes and sleep apnea    Opioid dependence ([address] 12/11/2010    Palpitations 10/26/2016    Dilcia Waters returns for followup. We did not detect any arrhythmias on her event recorder. .  She takes her blood pressure with a wrist cuff and occasionally notes high pressures and heart rates in the 190. Reviewing her old records she did have an EP study 2000 this showed easily inducible atrial flutter was apparently on Rythmol for a period of time.   She now relates that she was hospitalized after     Paroxysmal atrial fibrillation ([address] 10/26/2016    Preop cardiovascular exam 01/23/2017    Primary hypothyroidism 12/13/2010    Prolonged emergence from general anesthesia     slow to wake up with hysterectomy    Psychiatric disorder     depression, anxiety    Respiratory failure ([address] 12/11/2010    Stroke ([address] 2010    was told had probable tia x2    Thyroid disease     hypo    TIA (transient ischemic attack) 10/26/2016    Type 2 diabetes mellitus without complication ([address] 05/54/0233    insulin reliant; AVG -130; s.s. of hypoglycemia @ 70s; last A1C 6.8 on 06/16/2022    Typical atrial flutter ([address] 01/23/2017    Unspecified adverse effect of anesthesia     pt had difficulty waking up once per pt    Unspecified sleep apnea     uses 3 Liters oxygen nightly    Weakness 12/13/2010        Past Surgical History:   Procedure Laterality Date    CARDIAC CATHETERIZATION      COLONOSCOPY N/A 5/29/2018    COLONOSCOPY/ 39 performed by Sweta Dobbins MD at [facility] ENDOSCOPY    COLONOSCOPY N/A 12/5/2017    COLONOSCOPY /   BMI 44 performed by Sweta Dobbins MD at [address] [place] [address] N/A 6/23/2022    T8-9 THORACIC LAMINOTOMY AND PLACEMENT OF SPINAL CORD STIMULATOR LEAD AND BATTERY/ANS performed by Mauro To MD at [facility] MAIN OR    STELLA AND BSO (CERVIX REMOVED)          Family History   Problem Relation Age of Onset    Heart Attack Father     Heart Disease Father     Post-op Nausea/Vomiting Mother     Cancer Mother         skin Social History     Socioeconomic History    Marital status:    Tobacco Use    Smoking status: Never    Smokeless tobacco: Never   Vaping Use    Vaping Use: Never used   Substance and Sexual Activity    Alcohol use: No    Drug use: No   Social History Narrative    She is a never smoker, lives with her  and has two children that are accompanying her to the hospital.  She does not use illicit drugs apparently. Allergies: Latex, Benzodiazepines, Diltiazem, Nsaids, and Amlodipine    Previous Medications    ASCORBIC ACID (VITAMIN C) 250 MG TABLET    Take by mouth daily    ASPIRIN 81 MG CHEWABLE TABLET    Take 81 mg by mouth at bedtime Preventative Only-pt denies heart attack, stents, blood clots. Per pt possibly had a TIA in the past    CARVEDILOL (COREG) 12.5 MG TABLET    Take 12.5 mg by mouth as needed Per pt depends on HR    CARVEDILOL (COREG) 6.25 MG TABLET    Take 6.25 mg by mouth as needed Per pt depends on HR    CELECOXIB (CELEBREX) 200 MG CAPSULE    Take 200 mg by mouth 2 times daily    CHLORTHALIDONE (HYGROTON) 25 MG TABLET    Take 25 mg by mouth as needed     COENZYME Q10 200 MG CAPS CAPSULE    Take 200 mg by mouth daily     DULOXETINE (CYMBALTA) 30 MG EXTENDED RELEASE CAPSULE    Take 30 mg by mouth at bedtime     DULOXETINE (CYMBALTA) 60 MG EXTENDED RELEASE CAPSULE    Take 60 mg by mouth daily    ESTRADIOL (ESTRACE) 0.5 MG TABLET    Take 0.5 mg by mouth daily     EZETIMIBE (ZETIA) 10 MG TABLET    Take 10 mg by mouth daily    FERROUS SULFATE (IRON 325) 325 (65 FE) MG TABLET    Take 325 mg by mouth Per pt takes 3-4 times a week    GABAPENTIN (NEURONTIN) 300 MG CAPSULE    Take 300 mg by mouth 3 times daily.     HYDRALAZINE (APRESOLINE) 25 MG TABLET    Take 25 mg by mouth every 6 hours     INSULIN NPH (HUMULIN N;NOVOLIN N) 100 UNIT/ML INJECTION VIAL    Inject 10 Units into the skin every morning     INSULIN REGULAR (HUMULIN R;NOVOLIN R) 100 UNIT/ML INJECTION    Inject into the skin Sliding scale    KETOCONAZOLE (NIZORAL) 2 % CREAM    Apply topically as needed     LEVOTHYROXINE (SYNTHROID) 125 MCG TABLET    Take by mouth every morning (before breakfast)    LIDOCAINE HCL 2.75 % LOTN    Apply topically as needed     LISINOPRIL (PRINIVIL;ZESTRIL) 40 MG TABLET    Take 40 mg by mouth at bedtime     LORATADINE (CLARITIN) 10 MG TABLET    Strength: 10 mg; Form: tablet; SIG: take 1 tab(s) orally once a day    MAGNESIUM OXIDE (MAG-OX) 400 MG TABLET    Take 500 mg by mouth 2 times daily    OMEPRAZOLE (PRILOSEC) 20 MG DELAYED RELEASE CAPSULE    Take 20 mg by mouth 2 times daily    ONDANSETRON (ZOFRAN) 4 MG TABLET    Take 4 mg by mouth every 8 hours as needed    ONDANSETRON (ZOFRAN-ODT) 4 MG DISINTEGRATING TABLET        OXYCODONE-ACETAMINOPHEN (PERCOCET)  MG PER TABLET        PITAVASTATIN (LIVALO) 2 MG TABS TABLET    Take 2 mg by mouth daily    SPIRONOLACTONE (ALDACTONE) 25 MG TABLET    spironolactone 25 mg tablet    SUMATRIPTAN (IMITREX) 100 MG TABLET    sumatriptan 100 mg tablet    TIZANIDINE (ZANAFLEX) 4 MG TABLET    Take 4 mg by mouth as needed     TOPIRAMATE (TOPAMAX) 100 MG TABLET    Take by mouth 2 times daily    TRAZODONE (DESYREL) 50 MG TABLET    Take 50 mg by mouth nightly     VITAMIN D3 (CHOLECALCIFEROL) 125 MCG (5000 UT) TABS TABLET    Take by mouth daily        Vitals signs and nursing note reviewed. Patient Vitals for the past 4 hrs:   Temp Pulse Resp BP SpO2   10/05/22 0100 100.1 °F (37.8 °C) 94 23 (!) 143/87 95 %   10/05/22 0047 -- 83 26 -- 96 %   10/05/22 0030 -- 91 18 (!) 140/61 --   10/05/22 0025 -- -- -- -- 96 %   10/05/22 0004 -- 89 16 (!) 132/58 97 %          Physical Exam  Vitals and nursing note reviewed. Constitutional:       Appearance: Normal appearance. She is well-developed. She is obese. Comments: Chronically ill-appearing   HENT:      Head: Normocephalic and atraumatic. Nose: Nose normal.      Mouth/Throat:      Mouth: Mucous membranes are dry.       Pharynx: Oropharynx is clear. Eyes:      Extraocular Movements: Extraocular movements intact. Pupils: Pupils are equal, round, and reactive to light. Cardiovascular:      Rate and Rhythm: Normal rate and regular rhythm. Heart sounds: Normal heart sounds. Pulmonary:      Effort: Pulmonary effort is normal. No respiratory distress. Breath sounds: Normal breath sounds. Abdominal:      General: Abdomen is flat. There is no distension. Tenderness: There is no abdominal tenderness. Musculoskeletal:         General: Normal range of motion. Skin:     General: Skin is warm and dry. Neurological:      General: No focal deficit present. Mental Status: She is alert. Mental status is at baseline. Psychiatric:         Mood and Affect: Mood normal.        Procedures    ED EKG Interpretation  EKG was interpreted in the absence of a cardiologist.    Rate: 78  EKG Interpretation: EKG Interpretation: sinus rhythm  ST Segments: Nonspecific ST segments - NO STEMI, limited by artifact    Results for orders placed or performed during the hospital encounter of 10/04/22   Rapid influenza A/B antigens    Specimen: Nasal Washing   Result Value Ref Range    Influenza A Ag Negative NEG      Influenza B Ag Negative NEG      Comments HIDE     Source Nasopharyngeal     COVID-19, Rapid    Specimen: Nasopharyngeal   Result Value Ref Range    Source Nasopharyngeal      SARS-CoV-2, Rapid Not detected NOTD     XR CHEST PORTABLE    Narrative    CHEST X-RAY, single portable view  10/4/2022    History: Fall. Technique: Single frontal view of the chest.    Comparison: Chest x-ray 12/14/2010    Findings:   Today's study is limited given that the patient is imaged obliqued to the right. The cardiac silhouette is not clearly enlarged given AP technique of this exam.   There is an asymmetric appearance of the left hilum which may be related to the  patient's positioning.   Similar prominence has been seen such as on a prior  comparison dated 12/11/2010 therefore this is not highly suspicious. The lungs  are expanded without evidence for pneumothorax. No consolidative airspace  process or pleural effusion is seen. Impression    1. Limited study without significant acute changes evident. CT ABDOMEN PELVIS W IV CONTRAST Additional Contrast? None    Narrative    EXAM: CT ABDOMEN PELVIS W IV CONTRAST    HISTORY: sepsis. TECHNIQUE: Axial images of the abdomen and pelvis were performed following the  administration of intravenous contrast. Images were obtained in the axial plane  and coronal reformatted images were created and reviewed. Dose reduction technique used: Automated exposure control/Adjustment of the mA  and/or kV according to patient size/Use of iterative reconstruction technique. 100 mL of Isovue-370 were utilized. COMPARISON: CT chest dated 9/12/2009. FINDINGS:   The visualized lung bases show mild dependent atelectasis. The visualized mediastinum appears unremarkable. The liver, spleen, adrenal glands, and kidneys are within normal limits. The  bladder appears grossly normal. The pancreas appears atrophic. The gallbladder is dilated. It measures up to 6.1 cm transversely. It contains  at least one radiopaque stone. Distal esophagus appears unremarkable. The patient is status post gastric  bypass. Small and large bowel show no evidence of obstruction. A structure felt  to represent the appendix is seen in the right lower quadrant and appears  grossly normal.    No evidence of free fluid or free air. No pathologic adenopathy. No abdominal  aortic aneurysm. Osseous structures show no evidence of acute fracture or suspicious lesion. There is a small fat-containing umbilical hernia. Spinal stimulator leads are in place at the level of the thoracic spine. There  is a small fluid collection surrounding the subcutaneous portion. Impression    The gallbladder is dilated.  It measures up to 6.1 cm transversely. It contains  at least one radiopaque stone. There is no appreciable pericholecystic fluid or  obvious wall thickening. Spinal stimulator leads are in place at the level of the thoracic spine. There  is a small fluid collection surrounding the subcutaneous portion. The patient is status post gastric bypass.    Comprehensive Metabolic Panel   Result Value Ref Range    Sodium 131 (L) 136 - 145 mmol/L    Potassium 4.0 3.5 - 5.1 mmol/L    Chloride 96 (L) 101 - 110 mmol/L    CO2 25 21 - 32 mmol/L    Anion Gap 10 4 - 13 mmol/L    Glucose 190 (H) 65 - 100 mg/dL    BUN 54 (H) 8 - 23 MG/DL    Creatinine 1.45 (H) 0.6 - 1.0 MG/DL    Est, Glom Filt Rate 39 (L) >60 ml/min/1.73m2    Calcium 9.0 8.3 - 10.4 MG/DL    Total Bilirubin 1.0 0.2 - 1.1 MG/DL    ALT 27 12 - 65 U/L    AST 37 15 - 37 U/L    Alk Phosphatase 84 50 - 130 U/L    Total Protein 6.7 6.3 - 8.2 g/dL    Albumin 2.8 (L) 3.2 - 4.6 g/dL    Globulin 3.9 (H) 2.3 - 3.5 g/dL    Albumin/Globulin Ratio 0.7 (L) 1.2 - 3.5     CBC with Auto Differential   Result Value Ref Range    WBC 12.6 (H) 4.3 - 11.1 K/uL    RBC 3.87 (L) 4.05 - 5.2 M/uL    Hemoglobin 11.6 (L) 11.7 - 15.4 g/dL    Hematocrit 35.7 (L) 35.8 - 46.3 %    MCV 92.2 79.6 - 97.8 FL    MCH 30.0 26.1 - 32.9 PG    MCHC 32.5 31.4 - 35.0 g/dL    RDW 13.8 11.9 - 14.6 %    Platelets 821 (L) 735 - 450 K/uL    MPV 11.1 9.4 - 12.3 FL    nRBC 0.00 0.0 - 0.2 K/uL    Seg Neutrophils 77 (H) 47 - 75 %    Bands 17 (H) 0 - 6 %    Lymphocytes 5 (L) 16 - 44 %    Monocytes 1 (L) 3 - 9 %    Segs Absolute 11.9 (H) 1.7 - 8.2 K/UL    Absolute Lymph # 0.6 0.5 - 4.6 K/UL    Absolute Mono # 0.1 0.1 - 1.3 K/UL    RBC Comment NORMOCYTIC/NORMOCHROMIC      WBC Comment RARE      Platelet Comment ADEQUATE      Differential Type AUTOMATED     Lactate, Sepsis   Result Value Ref Range    Lactic Acid, Sepsis 1.3 0.5 - 2.0 MMOL/L   Procalcitonin   Result Value Ref Range    Procalcitonin 15.70 (H) 0.00 - 0.49 ng/mL Urinalysis w rflx microscopic   Result Value Ref Range    Color, UA YELLOW/STRAW      Appearance CLOUDY      Specific [place], UA 1.016 1.001 - 1.023      pH, Urine 5.5 5.0 - 9.0      Protein, UA 30 (A) NEG mg/dL    Glucose, UA Negative mg/dL    Ketones, Urine 15 (A) NEG mg/dL    Bilirubin Urine Negative NEG      Blood, Urine MODERATE (A) NEG      Urobilinogen, Urine 1.0 0.2 - 1.0 EU/dL    Nitrite, Urine Negative NEG      Leukocyte Esterase, Urine TRACE (A) NEG      WBC, UA 0-3 0 /hpf    RBC, UA 0-3 0 /hpf    Epithelial Cells UA 3-5 0 /hpf    BACTERIA, URINE TRACE 0 /hpf    Mucus, UA 1+ (H) 0 /lpf   EKG 12 Lead   Result Value Ref Range    Ventricular Rate 78 BPM    Atrial Rate 78 BPM    P-R Interval 154 ms    QRS Duration 92 ms    Q-T Interval 392 ms    QTc Calculation (Bazett) 446 ms    P Axis -8 degrees    R Axis 0 degrees    T Axis -11 degrees    Diagnosis Normal sinus rhythm         CT ABDOMEN PELVIS W IV CONTRAST Additional Contrast? None   Final Result   The gallbladder is dilated. It measures up to 6.1 cm transversely. It contains   at least one radiopaque stone. There is no appreciable pericholecystic fluid or   obvious wall thickening. Spinal stimulator leads are in place at the level of the thoracic spine. There   is a small fluid collection surrounding the subcutaneous portion. The patient is status post gastric bypass. XR CHEST PORTABLE   Final Result   1. Limited study without significant acute changes evident. Voice dictation software was used during the making of this note. This software is not perfect and grammatical and other typographical errors may be present. This note has not been completely proofread for errors.      Ca Shook MD  10/05/22 0989

## 2022-10-05 NOTE — PROGRESS NOTES
TRANSFER - IN REPORT:    Verbal report received from Elspeth Dubin, RN on Filiberto Ochoa  being received from [facility] ED for routine progression of patient care      Report consisted of patient's Situation, Background, Assessment and   Recommendations(SBAR). Information from the following report(s) Nurse Handoff Report, ED Encounter Summary, ED SBAR, Adult Overview, Intake/Output, MAR, Recent Results, Med Rec Status, Quality Measures, and Neuro Assessment was reviewed with the receiving nurse. Opportunity for questions and clarification was provided. Assessment completed upon patient's arrival to unit and care assumed.

## 2022-10-05 NOTE — PROGRESS NOTES
[address] [facility] Pharmacokinetic Monitoring Service - Vancomycin     Abraham Ulloa is a 69 y.o. female starting on vancomycin therapy for Sepsis/ Cellulitis. Pharmacy consulted by Dr. Mariano Oro for monitoring and adjustment. Target Concentration: Goal AUC/SCARLET 400-600 mg*hr/L    Additional Antimicrobials: Zosyn    Pertinent Laboratory Values: Wt Readings from Last 1 Encounters:   10/04/22 205 lb (93 kg)     Temp Readings from Last 1 Encounters:   10/05/22 100.1 °F (37.8 °C) (Oral)     Recent Labs     10/04/22  2207   BUN 54*   CREATININE 1.45*   WBC 12.6*   PROCAL 15.70*     Estimated Creatinine Clearance: 43 mL/min (A) (based on SCr of 1.45 mg/dL (H)). No results found for: Kun Rivera    MRSA Nasal Swab: N/A. Non-respiratory infection. .    Plan:  Dosing recommendations based on Bayesian software  Start vancomycin 2000mg IV x 1 dose followed by Vancomycin 1250 mg IV q24h  Anticipated AUC of 488 and trough concentration of 15.2 at steady state  Renal labs as indicated   Vancomycin concentrations will be ordered as clinically appropriate   Pharmacy will continue to monitor patient and adjust therapy as indicated    Thank you for the consult,  Vijay Schuler, [facility]

## 2022-10-05 NOTE — CARE COORDINATION
10/05/22 1132   Service Assessment   Patient Orientation Alert and Oriented  (Difficult to assess. Pt kept repeating herself, was in pain, and not following conversation.)   Cognition Alert   History Provided By Patient   Primary Caregiver Self   Support Systems Spouse/Significant Other;Children   PCP Verified by CM Yes   Prior Functional Level Independent in ADLs/IADLs   Current Functional Level Assistance with the following:   Can patient return to prior living arrangement Unknown at present   Ability to make needs known: Fair   Family able to assist with home care needs: Yes   Would you like for me to discuss the discharge plan with any other family members/significant others, and if so, who? No   Social/Functional History   Lives With Spouse   Receives Help From Family   ADL Assistance Independent   Homemaking Assistance Needs assistance   Homemaking Responsibilities No   Ambulation Assistance Independent   Active  No  (has a license but doesn't drive)   Occupation Retired   Discharge Planning   Type of [address] Medications No   Patient expects to be discharged to: [address] Discharge   [address] Provided? No   Mode of Transport at Discharge Self   Sw reviewed chart and attempted to assess pt. Pt is a 69 yr old lady who was adm yesterday due to cellulitis, weakness, and falls. Pt was very difficult to assess. She kept repeating herself, saying she was in pain, and unable to fully follow Sw conversation. Pt says she is  and has 3 daughters who all live locally. Pt has 3 grandchildren. Pt informed she has a license but does not need to drive. Sw asked if she had or  used  equip. She said she has a walker but doesn't use it. Pt said her  does the driving, housework, and cooking.  Sw asked if she had any C and she informed she didn't need it. Attempted to discuss her chronic pain but pt just said back. Sw attempted to ask about her falls and she could not answer. Pt was repeating sepsis, sepsis. Discussed pt in Md rounds. PT/OT jessy recommended. Sw will need to follow closely. Pt will likely need HHC and or STR.  Grover Howard

## 2022-10-06 ENCOUNTER — APPOINTMENT (OUTPATIENT)
Dept: CT IMAGING | Age: 67
DRG: 872 | End: 2022-10-06
Payer: MEDICARE

## 2022-10-06 ENCOUNTER — APPOINTMENT (OUTPATIENT)
Dept: NON INVASIVE DIAGNOSTICS | Age: 67
DRG: 872 | End: 2022-10-06
Payer: MEDICARE

## 2022-10-06 LAB
ALBUMIN SERPL-MCNC: 2.4 G/DL (ref 3.2–4.6)
ALBUMIN/GLOB SERPL: 0.6 {RATIO} (ref 1.2–3.5)
ALP SERPL-CCNC: 93 U/L (ref 50–130)
ALT SERPL-CCNC: 23 U/L (ref 12–65)
ANION GAP SERPL CALC-SCNC: 10 MMOL/L (ref 4–13)
AST SERPL-CCNC: 29 U/L (ref 15–37)
BASOPHILS # BLD: 0.1 K/UL (ref 0–0.2)
BASOPHILS NFR BLD: 0 % (ref 0–2)
BILIRUB SERPL-MCNC: 0.6 MG/DL (ref 0.2–1.1)
BUN SERPL-MCNC: 24 MG/DL (ref 8–23)
CALCIUM SERPL-MCNC: 9 MG/DL (ref 8.3–10.4)
CHLORIDE SERPL-SCNC: 100 MMOL/L (ref 101–110)
CO2 SERPL-SCNC: 23 MMOL/L (ref 21–32)
CREAT SERPL-MCNC: 0.75 MG/DL (ref 0.6–1)
DIFFERENTIAL METHOD BLD: ABNORMAL
ECHO AO ASC DIAM: 3.6 CM
ECHO AO ASCENDING AORTA INDEX: 1.76 CM/M2
ECHO AO ROOT DIAM: 3.2 CM
ECHO AO ROOT INDEX: 1.57 CM/M2
ECHO AV AREA PEAK VELOCITY: 1.6 CM2
ECHO AV AREA VTI: 1.8 CM2
ECHO AV AREA/BSA PEAK VELOCITY: 0.8 CM2/M2
ECHO AV AREA/BSA VTI: 0.9 CM2/M2
ECHO AV MEAN GRADIENT: 7 MMHG
ECHO AV MEAN GRADIENT: 7 MMHG
ECHO AV MEAN VELOCITY: 1.2 M/S
ECHO AV MEAN VELOCITY: 1.2 M/S
ECHO AV PEAK GRADIENT: 14 MMHG
ECHO AV PEAK VELOCITY: 1.9 M/S
ECHO AV VELOCITY RATIO: 0.53
ECHO AV VTI: 36.4 CM
ECHO BSA: 2.1 M2
ECHO EST RA PRESSURE: 3 MMHG
ECHO IVC EXP: 1.9 CM
ECHO LA AREA 2C: 23.2 CM2
ECHO LA AREA 4C: 15.4 CM2
ECHO LA DIAMETER INDEX: 1.81 CM/M2
ECHO LA DIAMETER: 3.7 CM
ECHO LA MAJOR AXIS: 5.1 CM
ECHO LA MINOR AXIS: 6.2 CM
ECHO LA TO AORTIC ROOT RATIO: 1.16
ECHO LA VOL BP: 56 ML (ref 22–52)
ECHO LA VOL/BSA BIPLANE: 27 ML/M2 (ref 16–34)
ECHO LV E' LATERAL VELOCITY: 15 CM/S
ECHO LV E' SEPTAL VELOCITY: 10 CM/S
ECHO LV EDV A2C: 116 ML
ECHO LV EDV A4C: 139 ML
ECHO LV EDV INDEX A4C: 68 ML/M2
ECHO LV EDV NDEX A2C: 57 ML/M2
ECHO LV EJECTION FRACTION A2C: 64 %
ECHO LV EJECTION FRACTION A4C: 62 %
ECHO LV EJECTION FRACTION BIPLANE: 63 % (ref 55–100)
ECHO LV ESV A2C: 42 ML
ECHO LV ESV A4C: 52 ML
ECHO LV ESV INDEX A2C: 21 ML/M2
ECHO LV ESV INDEX A4C: 25 ML/M2
ECHO LV FRACTIONAL SHORTENING: 28 % (ref 28–44)
ECHO LV INTERNAL DIMENSION DIASTOLE INDEX: 2.3 CM/M2
ECHO LV INTERNAL DIMENSION DIASTOLIC: 4.7 CM (ref 3.9–5.3)
ECHO LV INTERNAL DIMENSION SYSTOLIC INDEX: 1.67 CM/M2
ECHO LV INTERNAL DIMENSION SYSTOLIC: 3.4 CM
ECHO LV IVSD: 1.5 CM (ref 0.6–0.9)
ECHO LV MASS 2D: 224.8 G (ref 67–162)
ECHO LV MASS INDEX 2D: 110.2 G/M2 (ref 43–95)
ECHO LV POSTERIOR WALL DIASTOLIC: 1 CM (ref 0.6–0.9)
ECHO LV RELATIVE WALL THICKNESS RATIO: 0.43
ECHO LVOT AREA: 3.1 CM2
ECHO LVOT AV VTI INDEX: 0.56
ECHO LVOT DIAM: 2 CM
ECHO LVOT MEAN GRADIENT: 2 MMHG
ECHO LVOT PEAK GRADIENT: 4 MMHG
ECHO LVOT PEAK VELOCITY: 1 M/S
ECHO LVOT STROKE VOLUME INDEX: 31.6 ML/M2
ECHO LVOT SV: 64.4 ML
ECHO LVOT VTI: 20.5 CM
ECHO MV A VELOCITY: 0.79 M/S
ECHO MV AREA VTI: 2.3 CM2
ECHO MV E DECELERATION TIME (DT): 143 MS
ECHO MV E VELOCITY: 1.38 M/S
ECHO MV E/A RATIO: 1.75
ECHO MV E/E' LATERAL: 9.2
ECHO MV E/E' RATIO (AVERAGED): 11.5
ECHO MV E/E' SEPTAL: 13.8
ECHO MV LVOT VTI INDEX: 1.35
ECHO MV MAX VELOCITY: 1.2 M/S
ECHO MV MEAN GRADIENT: 2 MMHG
ECHO MV MEAN GRADIENT: 2 MMHG
ECHO MV MEAN VELOCITY: 0.7 M/S
ECHO MV PEAK GRADIENT: 6 MMHG
ECHO MV VTI: 27.6 CM
ECHO PV ACCELERATION TIME (AT): 151 MS
ECHO PV MAX VELOCITY: 1.2 M/S
ECHO PV PEAK GRADIENT: 5 MMHG
ECHO RV BASAL DIMENSION: 3.7 CM
ECHO RV INTERNAL DIMENSION: 2.9 CM
ECHO RV TAPSE: 2.2 CM (ref 1.7–?)
EOSINOPHIL # BLD: 0 K/UL (ref 0–0.8)
EOSINOPHIL NFR BLD: 0 % (ref 0.5–7.8)
ERYTHROCYTE [DISTWIDTH] IN BLOOD BY AUTOMATED COUNT: 13.7 % (ref 11.9–14.6)
GLOBULIN SER CALC-MCNC: 4.2 G/DL (ref 2.3–3.5)
GLUCOSE BLD STRIP.AUTO-MCNC: 190 MG/DL (ref 65–100)
GLUCOSE BLD STRIP.AUTO-MCNC: 214 MG/DL (ref 65–100)
GLUCOSE BLD STRIP.AUTO-MCNC: 221 MG/DL (ref 65–100)
GLUCOSE BLD STRIP.AUTO-MCNC: 247 MG/DL (ref 65–100)
GLUCOSE SERPL-MCNC: 233 MG/DL (ref 65–100)
HCT VFR BLD AUTO: 35 % (ref 35.8–46.3)
HGB BLD-MCNC: 11.6 G/DL (ref 11.7–15.4)
IMM GRANULOCYTES # BLD AUTO: 0.1 K/UL (ref 0–0.5)
IMM GRANULOCYTES NFR BLD AUTO: 1 % (ref 0–5)
LV EF: 58 %
LVEF MODALITY: ABNORMAL
LYMPHOCYTES # BLD: 1.2 K/UL (ref 0.5–4.6)
LYMPHOCYTES NFR BLD: 9 % (ref 13–44)
MAGNESIUM SERPL-MCNC: 2.1 MG/DL (ref 1.8–2.4)
MCH RBC QN AUTO: 29.8 PG (ref 26.1–32.9)
MCHC RBC AUTO-ENTMCNC: 33.1 G/DL (ref 31.4–35)
MCV RBC AUTO: 90 FL (ref 79.6–97.8)
MM INDURATION, POC: NORMAL (ref 0–5)
MONOCYTES # BLD: 1.6 K/UL (ref 0.1–1.3)
MONOCYTES NFR BLD: 11 % (ref 4–12)
NEUTS SEG # BLD: 11.4 K/UL (ref 1.7–8.2)
NEUTS SEG NFR BLD: 79 % (ref 43–78)
NRBC # BLD: 0 K/UL (ref 0–0.2)
PLATELET # BLD AUTO: 130 K/UL (ref 150–450)
PMV BLD AUTO: 11.5 FL (ref 9.4–12.3)
POTASSIUM SERPL-SCNC: 3.3 MMOL/L (ref 3.5–5.1)
PPD, POC: NORMAL
PROT SERPL-MCNC: 6.6 G/DL (ref 6.3–8.2)
RBC # BLD AUTO: 3.89 M/UL (ref 4.05–5.2)
SERVICE CMNT-IMP: ABNORMAL
SODIUM SERPL-SCNC: 133 MMOL/L (ref 136–145)
VANCOMYCIN SERPL-MCNC: 17.1 UG/ML
WBC # BLD AUTO: 14.4 K/UL (ref 4.3–11.1)

## 2022-10-06 PROCEDURE — 6370000000 HC RX 637 (ALT 250 FOR IP): Performed by: HOSPITALIST

## 2022-10-06 PROCEDURE — 70491 CT SOFT TISSUE NECK W/DYE: CPT

## 2022-10-06 PROCEDURE — 2580000003 HC RX 258: Performed by: HOSPITALIST

## 2022-10-06 PROCEDURE — 2580000003 HC RX 258: Performed by: INTERNAL MEDICINE

## 2022-10-06 PROCEDURE — 6370000000 HC RX 637 (ALT 250 FOR IP): Performed by: INTERNAL MEDICINE

## 2022-10-06 PROCEDURE — C8929 TTE W OR WO FOL WCON,DOPPLER: HCPCS

## 2022-10-06 PROCEDURE — 71260 CT THORAX DX C+: CPT

## 2022-10-06 PROCEDURE — 1100000000 HC RM PRIVATE

## 2022-10-06 PROCEDURE — 6360000002 HC RX W HCPCS: Performed by: HOSPITALIST

## 2022-10-06 PROCEDURE — 97530 THERAPEUTIC ACTIVITIES: CPT

## 2022-10-06 PROCEDURE — 97535 SELF CARE MNGMENT TRAINING: CPT

## 2022-10-06 PROCEDURE — 80202 ASSAY OF VANCOMYCIN: CPT

## 2022-10-06 PROCEDURE — 87205 SMEAR GRAM STAIN: CPT

## 2022-10-06 PROCEDURE — 85025 COMPLETE CBC W/AUTO DIFF WBC: CPT

## 2022-10-06 PROCEDURE — 6360000002 HC RX W HCPCS: Performed by: INTERNAL MEDICINE

## 2022-10-06 PROCEDURE — 36415 COLL VENOUS BLD VENIPUNCTURE: CPT

## 2022-10-06 PROCEDURE — 80053 COMPREHEN METABOLIC PANEL: CPT

## 2022-10-06 PROCEDURE — 83735 ASSAY OF MAGNESIUM: CPT

## 2022-10-06 PROCEDURE — 6360000004 HC RX CONTRAST MEDICATION: Performed by: INTERNAL MEDICINE

## 2022-10-06 PROCEDURE — 87040 BLOOD CULTURE FOR BACTERIA: CPT

## 2022-10-06 PROCEDURE — 93306 TTE W/DOPPLER COMPLETE: CPT | Performed by: INTERNAL MEDICINE

## 2022-10-06 PROCEDURE — 97165 OT EVAL LOW COMPLEX 30 MIN: CPT

## 2022-10-06 PROCEDURE — 82962 GLUCOSE BLOOD TEST: CPT

## 2022-10-06 RX ORDER — LEVOTHYROXINE SODIUM 0.12 MG/1
125 TABLET ORAL DAILY
Status: DISCONTINUED | OUTPATIENT
Start: 2022-10-07 | End: 2022-10-11 | Stop reason: HOSPADM

## 2022-10-06 RX ORDER — CARVEDILOL 6.25 MG/1
6.25 TABLET ORAL 2 TIMES DAILY WITH MEALS
Status: DISCONTINUED | OUTPATIENT
Start: 2022-10-06 | End: 2022-10-11 | Stop reason: HOSPADM

## 2022-10-06 RX ORDER — DIPHENHYDRAMINE HYDROCHLORIDE 50 MG/ML
25 INJECTION INTRAMUSCULAR; INTRAVENOUS ONCE
Status: COMPLETED | OUTPATIENT
Start: 2022-10-06 | End: 2022-10-06

## 2022-10-06 RX ORDER — HYDROMORPHONE HYDROCHLORIDE 1 MG/ML
1 INJECTION, SOLUTION INTRAMUSCULAR; INTRAVENOUS; SUBCUTANEOUS EVERY 4 HOURS PRN
Status: DISCONTINUED | OUTPATIENT
Start: 2022-10-06 | End: 2022-10-08

## 2022-10-06 RX ORDER — 0.9 % SODIUM CHLORIDE 0.9 %
100 INTRAVENOUS SOLUTION INTRAVENOUS ONCE
Status: COMPLETED | OUTPATIENT
Start: 2022-10-06 | End: 2022-10-06

## 2022-10-06 RX ORDER — GABAPENTIN 300 MG/1
300 CAPSULE ORAL 3 TIMES DAILY
Status: DISCONTINUED | OUTPATIENT
Start: 2022-10-06 | End: 2022-10-11 | Stop reason: HOSPADM

## 2022-10-06 RX ORDER — EZETIMIBE 10 MG/1
10 TABLET ORAL NIGHTLY
Status: DISCONTINUED | OUTPATIENT
Start: 2022-10-06 | End: 2022-10-11 | Stop reason: HOSPADM

## 2022-10-06 RX ORDER — SPIRONOLACTONE 25 MG/1
25 TABLET ORAL DAILY
Status: DISCONTINUED | OUTPATIENT
Start: 2022-10-06 | End: 2022-10-11 | Stop reason: HOSPADM

## 2022-10-06 RX ORDER — DULOXETIN HYDROCHLORIDE 60 MG/1
60 CAPSULE, DELAYED RELEASE ORAL NIGHTLY
Status: DISCONTINUED | OUTPATIENT
Start: 2022-10-06 | End: 2022-10-11 | Stop reason: HOSPADM

## 2022-10-06 RX ORDER — METOCLOPRAMIDE HYDROCHLORIDE 5 MG/ML
10 INJECTION INTRAMUSCULAR; INTRAVENOUS ONCE
Status: COMPLETED | OUTPATIENT
Start: 2022-10-06 | End: 2022-10-06

## 2022-10-06 RX ORDER — PANTOPRAZOLE SODIUM 40 MG/1
40 TABLET, DELAYED RELEASE ORAL
Status: DISCONTINUED | OUTPATIENT
Start: 2022-10-06 | End: 2022-10-11 | Stop reason: HOSPADM

## 2022-10-06 RX ORDER — SODIUM CHLORIDE 0.9 % (FLUSH) 0.9 %
10 SYRINGE (ML) INJECTION
Status: COMPLETED | OUTPATIENT
Start: 2022-10-06 | End: 2022-10-06

## 2022-10-06 RX ADMIN — ENOXAPARIN SODIUM 40 MG: 100 INJECTION SUBCUTANEOUS at 09:27

## 2022-10-06 RX ADMIN — METOCLOPRAMIDE 10 MG: 5 INJECTION, SOLUTION INTRAMUSCULAR; INTRAVENOUS at 12:00

## 2022-10-06 RX ADMIN — DULOXETINE HYDROCHLORIDE 60 MG: 60 CAPSULE, DELAYED RELEASE ORAL at 21:31

## 2022-10-06 RX ADMIN — IOPAMIDOL 100 ML: 755 INJECTION, SOLUTION INTRAVENOUS at 12:44

## 2022-10-06 RX ADMIN — VANCOMYCIN HYDROCHLORIDE 750 MG: 750 INJECTION, POWDER, LYOPHILIZED, FOR SOLUTION INTRAVENOUS at 10:12

## 2022-10-06 RX ADMIN — HYDROMORPHONE HYDROCHLORIDE 1 MG: 1 INJECTION, SOLUTION INTRAMUSCULAR; INTRAVENOUS; SUBCUTANEOUS at 23:11

## 2022-10-06 RX ADMIN — HYDROMORPHONE HYDROCHLORIDE 1 MG: 1 INJECTION, SOLUTION INTRAMUSCULAR; INTRAVENOUS; SUBCUTANEOUS at 12:19

## 2022-10-06 RX ADMIN — TOPIRAMATE 100 MG: 100 TABLET, FILM COATED ORAL at 21:31

## 2022-10-06 RX ADMIN — SODIUM CHLORIDE, PRESERVATIVE FREE 10 ML: 5 INJECTION INTRAVENOUS at 12:44

## 2022-10-06 RX ADMIN — EZETIMIBE 10 MG: 10 TABLET ORAL at 21:31

## 2022-10-06 RX ADMIN — GABAPENTIN 300 MG: 300 CAPSULE ORAL at 21:31

## 2022-10-06 RX ADMIN — OXYCODONE AND ACETAMINOPHEN 1 TABLET: 10; 325 TABLET ORAL at 21:41

## 2022-10-06 RX ADMIN — INSULIN LISPRO 2 UNITS: 100 INJECTION, SOLUTION INTRAVENOUS; SUBCUTANEOUS at 11:35

## 2022-10-06 RX ADMIN — CARVEDILOL 6.25 MG: 6.25 TABLET, FILM COATED ORAL at 16:17

## 2022-10-06 RX ADMIN — INSULIN LISPRO 2 UNITS: 100 INJECTION, SOLUTION INTRAVENOUS; SUBCUTANEOUS at 17:11

## 2022-10-06 RX ADMIN — SODIUM CHLORIDE, PRESERVATIVE FREE 10 ML: 5 INJECTION INTRAVENOUS at 09:28

## 2022-10-06 RX ADMIN — PIPERACILLIN AND TAZOBACTAM 3375 MG: 3; .375 INJECTION, POWDER, FOR SOLUTION INTRAVENOUS at 05:37

## 2022-10-06 RX ADMIN — VANCOMYCIN HYDROCHLORIDE 750 MG: 750 INJECTION, POWDER, LYOPHILIZED, FOR SOLUTION INTRAVENOUS at 17:20

## 2022-10-06 RX ADMIN — SODIUM CHLORIDE, PRESERVATIVE FREE 10 ML: 5 INJECTION INTRAVENOUS at 21:32

## 2022-10-06 RX ADMIN — VANCOMYCIN HYDROCHLORIDE 1250 MG: 10 INJECTION, POWDER, LYOPHILIZED, FOR SOLUTION INTRAVENOUS at 02:34

## 2022-10-06 RX ADMIN — CARVEDILOL 6.25 MG: 6.25 TABLET, FILM COATED ORAL at 11:56

## 2022-10-06 RX ADMIN — HYDROMORPHONE HYDROCHLORIDE 1 MG: 1 INJECTION, SOLUTION INTRAMUSCULAR; INTRAVENOUS; SUBCUTANEOUS at 16:16

## 2022-10-06 RX ADMIN — PERFLUTREN 0.15 ML: 6.52 INJECTION, SUSPENSION INTRAVENOUS at 08:56

## 2022-10-06 RX ADMIN — ATORVASTATIN CALCIUM 10 MG: 10 TABLET, FILM COATED ORAL at 09:27

## 2022-10-06 RX ADMIN — CEFTRIAXONE 2000 MG: 2 INJECTION, POWDER, FOR SOLUTION INTRAMUSCULAR; INTRAVENOUS at 11:57

## 2022-10-06 RX ADMIN — TRAZODONE HYDROCHLORIDE 50 MG: 50 TABLET ORAL at 21:32

## 2022-10-06 RX ADMIN — ASPIRIN 81 MG: 81 TABLET, CHEWABLE ORAL at 21:31

## 2022-10-06 RX ADMIN — SODIUM CHLORIDE 100 ML: 9 INJECTION, SOLUTION INTRAVENOUS at 12:44

## 2022-10-06 RX ADMIN — DIPHENHYDRAMINE HYDROCHLORIDE 25 MG: 50 INJECTION INTRAMUSCULAR; INTRAVENOUS at 12:00

## 2022-10-06 RX ADMIN — TOPIRAMATE 100 MG: 100 TABLET, FILM COATED ORAL at 09:27

## 2022-10-06 RX ADMIN — SPIRONOLACTONE 25 MG: 25 TABLET ORAL at 11:57

## 2022-10-06 RX ADMIN — GABAPENTIN 300 MG: 300 CAPSULE ORAL at 11:57

## 2022-10-06 RX ADMIN — OXYCODONE AND ACETAMINOPHEN 1 TABLET: 10; 325 TABLET ORAL at 00:26

## 2022-10-06 RX ADMIN — PANTOPRAZOLE SODIUM 40 MG: 40 TABLET, DELAYED RELEASE ORAL at 11:56

## 2022-10-06 RX ADMIN — HYDROMORPHONE HYDROCHLORIDE 0.5 MG: 1 INJECTION, SOLUTION INTRAMUSCULAR; INTRAVENOUS; SUBCUTANEOUS at 05:38

## 2022-10-06 ASSESSMENT — PAIN DESCRIPTION - ONSET
ONSET: GRADUAL
ONSET: GRADUAL

## 2022-10-06 ASSESSMENT — PAIN DESCRIPTION - DESCRIPTORS
DESCRIPTORS: ACHING
DESCRIPTORS: THROBBING
DESCRIPTORS: ACHING
DESCRIPTORS: THROBBING
DESCRIPTORS: ACHING

## 2022-10-06 ASSESSMENT — PAIN SCALES - GENERAL
PAINLEVEL_OUTOF10: 3
PAINLEVEL_OUTOF10: 4
PAINLEVEL_OUTOF10: 7
PAINLEVEL_OUTOF10: 7
PAINLEVEL_OUTOF10: 3
PAINLEVEL_OUTOF10: 5
PAINLEVEL_OUTOF10: 2
PAINLEVEL_OUTOF10: 9
PAINLEVEL_OUTOF10: 9
PAINLEVEL_OUTOF10: 4

## 2022-10-06 ASSESSMENT — PAIN DESCRIPTION - LOCATION
LOCATION: BACK
LOCATION: BACK;NECK;HEAD
LOCATION: BACK
LOCATION: BACK;LEG
LOCATION: BACK
LOCATION: NECK;BACK
LOCATION: BACK
LOCATION: LEG;BACK

## 2022-10-06 ASSESSMENT — PAIN DESCRIPTION - PAIN TYPE
TYPE: ACUTE PAIN
TYPE: ACUTE PAIN

## 2022-10-06 ASSESSMENT — PAIN DESCRIPTION - ORIENTATION
ORIENTATION: LOWER
ORIENTATION: LOWER
ORIENTATION: MID
ORIENTATION: RIGHT

## 2022-10-06 ASSESSMENT — PAIN DESCRIPTION - FREQUENCY
FREQUENCY: INTERMITTENT
FREQUENCY: CONTINUOUS

## 2022-10-06 NOTE — PROGRESS NOTES
VANCO DAILY FOLLOW UP NOTE  [address] [facility] Pharmacokinetic Monitoring Service - Vancomycin    Consulting Provider: Dr. Nayana Page   Indication: sepsis  Target Concentration: Goal AUC/SCARLET 400-600 mg*hr/L  Day of Therapy: 2  Additional Antimicrobials: zosyn     Pertinent Laboratory Values: Wt Readings from Last 1 Encounters:   10/04/22 205 lb (93 kg)     Temp Readings from Last 1 Encounters:   10/06/22 97.9 °F (36.6 °C)     Recent Labs     10/04/22  2207 10/06/22  0313   BUN 54* 24*   CREATININE 1.45* 0.75   WBC 12.6* 14.4*   PROCAL 15.70*  --      Estimated Creatinine Clearance: 83 mL/min (based on SCr of 0.75 mg/dL). Lab Results   Component Value Date/Time    VANCORANDOM 17.1 10/06/2022 08:02 AM       MRSA Nasal Swab: N/A. Non-respiratory infection. Assessment:  Date/Time Dose Concentration AUC   10/6 0802 1250mg q24h 17.0 305   Note: Serum concentrations collected for AUC dosing may appear elevated if collected in close proximity to the dose administered, this is not necessarily an indication of toxicity    Plan:  Current dosing regimen is sub-therapeutic  Increase dose to 750mg q8h for predicted AUC/Tr of 532/ 19  Repeat vancomycin concentration ordered for 10/7 @ 0800    Pharmacy will continue to monitor patient and adjust therapy as indicated    Thank you for the consult,  Jorge A Keita.  MADISON Christy [facility]

## 2022-10-06 NOTE — PROGRESS NOTES
's initial visit to convey care and concern and explore spiritual/emotional needs. Ms. Nakia Gonzales voiced that she was having a difficult time lately. Ms. Nakia Gonzales has not had an appetite either. I affirmed Ms. Lizarraga's emotions. Staff checked on patient while I was visiting. Later, her family arrived during the visit as I was inquiring about patient's support system. Ms. Nakia Gonzales expressed appreciation for my concern and care and interest in future visits. I encouraged patient that chaplains remain available for support upon request. Family expressed gratitude for the visit.      [address]  Board Certified

## 2022-10-06 NOTE — CONSULTS
Infectious Disease Consult      Today's Date: 10/6/2022   Admit Date: 10/4/2022    Impression:   Patient is a 69year old female with chronic MSK pain who has a spinal stimulator, also has a history of DM 2 , last HbA1c of 6.8  Admitted with myalgias and RLL redness. Blood culture is positive for GBS    #GBS bacteremia  - [facility]: 0 major 1 minor  - Etiology: Potentially lower extremity cellulitis. May have seeded spinal stimulator, although there may be a different organism or explanation for fluid collection around spinal stimulator  - Work up: CT with fluid around spinal stimulator, TTE and CT neck pending. Sampling and drainage of fluid around spinal stimulator will be helpful t  - Treatment: Will switch PTZ to ceftriaxone. Will discontinue vancomycin  - Follow-up : White count uptrended to 14, no fever  - Duration      #Fluid around spinal stimulator  - Initial time of placement - 6/23/22  - Noted on initial CT abdomen 10/04, suggested possible fluid collection along subcutaneous aspect of spinal stimulator  - Follow-up CT done today should interval resolution of fluid collection   - Suspect superficial spinal stimulator site infection  - Can treat this with 10 days of antibiotics, especially given rapid resolution    Plan:   Dc ptz  Start ceftriaxone 2grams daily   Dc vancomycin  Follow cultures for MICs, can potentially switch to orals  If repeat blood cultures remain positive, may need re-evaluation of spinal cord stimulator    Anti-infectives:   Vanc - 10/04 - present  Ctx 10/06 - present  Ptz- 10/04 - 10/6    Subjective:   Date of Consultation:  October 6, 2022  Date of Admission: 10/4/2022   Referring Provider: Dr Umair Cavazos    Patient is a 69 y.o. female with PMH of type II DM with last HbA1c - 6.8 , patient is as/p thoracic laminotomy with spinal cord stimulator placement   Patient presented to the ED on 10/04 with primary complaints of generalized weakness for 3-4 days and myalgias.    She also reported right lower extremity redness and pain   No fever at the time of presentation but had a white count. Blood cultures drawn came back positive for GBS - patient was started on vancomycin and PTZ. A CT scan done on 10/04 showed a small fluid collection around the spinal stimulator. On follow-up evaluation on 10/05/22, she was complaining of back and neck pain. She now has a CT neck and CT chest which showed resolution of that fluid collection. Patient was not very communicative when I saw her. Daughter who was present at the time of evaluation relates that her primary issue was frequent falls prior to admission   Patient was not able to roll or sit up for me to examine her back. He right leg redness which is unilateral has significantly improved according to daughter              Patient Active Problem List   Diagnosis    Opioid dependence ([address]    Ulcer of right foot ([address]    HTN (hypertension)    Typical atrial flutter (HCC)    Other chest pain    Hypercapnic acidosis    Pure hypercholesterolemia    Fibromyalgia    Diarrhea    Murmur    Respiratory failure (HCC)    Weakness    Major depressive disorder, recurrent (HCC)    Generalized anxiety disorder    Hyperglycemia    Agatston coronary artery calcium score between 100 and 199    HEIDY (obstructive sleep apnea)    Edema    Paroxysmal atrial fibrillation (HCC)    Type 2 diabetes mellitus without complication (HCC)    Primary hypothyroidism    Obesity    Chronic low back pain    Chronic pain syndrome    Cellulitis of right lower extremity    Bacteremia due to Gram-positive bacteria    S/P insertion of spinal cord stimulator     Past Medical History:   Diagnosis Date    Arrhythmia     Arthritis     Atrial septal defect 10/26/2016    Small PFO.  Followed by Dr. Joanne Guillaume    Autoimmune disease [facility])     fibromyalgia    Chronic pain     fibromyalgia; chronic back pain     Diarrhea 12/15/2010    Edema 10/26/2016    Fibromyalgia 12/11/2010    GERD (gastroesophageal reflux disease)     HTN (hypertension) 12/11/2010    Hypercapnic acidosis 12/11/2010    Hyperglycemia 12/11/2010    Major depressive disorder, recurrent ([address] 03/13/2017    Obesity, Class III, BMI 40-49.9 (morbid obesity) ([address]     also type II diabetes and sleep apnea    Opioid dependence ([address] 12/11/2010    Palpitations 10/26/2016    Etelvina Hutchinson returns for followup. We did not detect any arrhythmias on her event recorder. .  She takes her blood pressure with a wrist cuff and occasionally notes high pressures and heart rates in the 190. Reviewing her old records she did have an EP study 2000 this showed easily inducible atrial flutter was apparently on Rythmol for a period of time.   She now relates that she was hospitalized after     Paroxysmal atrial fibrillation ([address] 10/26/2016    Preop cardiovascular exam 01/23/2017    Primary hypothyroidism 12/13/2010    Prolonged emergence from general anesthesia     slow to wake up with hysterectomy    Psychiatric disorder     depression, anxiety    Respiratory failure ([address] 12/11/2010    Stroke ([facility] 2010    was told had probable tia x2    Thyroid disease     hypo    TIA (transient ischemic attack) 10/26/2016    Type 2 diabetes mellitus without complication ([address] 39/77/6067    insulin reliant; AVG -130; s.s. of hypoglycemia @ 70s; last A1C 6.8 on 06/16/2022    Typical atrial flutter ([address] 01/23/2017    Unspecified adverse effect of anesthesia     pt had difficulty waking up once per pt    Unspecified sleep apnea     uses 3 Liters oxygen nightly    Weakness 12/13/2010      Family History   Problem Relation Age of Onset    Heart Attack Father     Heart Disease Father     Post-op Nausea/Vomiting Mother     Cancer Mother         skin      Social History     Tobacco Use    Smoking status: Never    Smokeless tobacco: Never   Substance Use Topics    Alcohol use: No     Past Surgical History:   Procedure Laterality Date    CARDIAC CATHETERIZATION      COLONOSCOPY N/A 5/29/2018 COLONOSCOPY/ 45 performed by Emory Tamez MD at [facility] ENDOSCOPY    COLONOSCOPY N/A 12/5/2017    COLONOSCOPY /   BMI 44 performed by Emory Tamez MD at [address] [place] [address] N/A 6/23/2022    T8-9 THORACIC LAMINOTOMY AND PLACEMENT OF SPINAL CORD STIMULATOR LEAD AND BATTERY/ANS performed by Grover Jansen MD at [facility] MAIN OR    STELLA AND BSO (CERVIX REMOVED)        Prior to Admission medications    Medication Sig Start Date End Date Taking? Authorizing Provider   SUMAtriptan (IMITREX) 100 MG tablet sumatriptan 100 mg tablet 12/10/21   Historical Provider, MD   oxyCODONE-acetaminophen (PERCOCET)  MG per tablet  7/1/22   Historical Provider, MD   ondansetron (ZOFRAN-ODT) 4 MG disintegrating tablet  7/10/22   Historical Provider, MD   spironolactone (ALDACTONE) 25 MG tablet spironolactone 25 mg tablet    Historical Provider, MD   pitavastatin (LIVALO) 2 MG TABS tablet Take 2 mg by mouth daily 4/18/22   Historical Provider, MD   carvedilol (COREG) 6.25 MG tablet Take 6.25 mg by mouth as needed Per pt depends on HR    Historical Provider, MD   gabapentin (NEURONTIN) 300 MG capsule Take 300 mg by mouth 3 times daily. Historical Provider, MD   DULoxetine (CYMBALTA) 60 MG extended release capsule Take 60 mg by mouth daily    Historical Provider, MD   ascorbic acid (VITAMIN C) 250 MG tablet Take by mouth daily    Ar Automatic Reconciliation   aspirin 81 MG chewable tablet Take 81 mg by mouth at bedtime Preventative Only-pt denies heart attack, stents, blood clots.  Per pt possibly had a TIA in the past    Ar Automatic Reconciliation   carvedilol (COREG) 12.5 MG tablet Take 12.5 mg by mouth as needed Per pt depends on HR 11/4/21   Ar Automatic Reconciliation   celecoxib (CELEBREX) 200 MG capsule Take 200 mg by mouth 2 times daily    Ar Automatic Reconciliation   chlorthalidone (HYGROTON) 25 MG tablet Take 25 mg by mouth as needed  10/22/21   Ar Automatic Reconciliation   vitamin D3 (CHOLECALCIFEROL) 125 MCG (5000 UT) TABS tablet Take by mouth daily    Ar Automatic Reconciliation   coenzyme Q10 200 MG CAPS capsule Take 200 mg by mouth daily     Ar Automatic Reconciliation   DULoxetine (CYMBALTA) 30 MG extended release capsule Take 30 mg by mouth at bedtime     Ar Automatic Reconciliation   estradiol (ESTRACE) 0.5 MG tablet Take 0.5 mg by mouth daily  2/7/17   Ar Automatic Reconciliation   ezetimibe (ZETIA) 10 MG tablet Take 10 mg by mouth daily 10/22/21   Ar Automatic Reconciliation   ferrous sulfate (IRON 325) 325 (65 Fe) MG tablet Take 325 mg by mouth Per pt takes 3-4 times a week    Ar Automatic Reconciliation   hydrALAZINE (APRESOLINE) 25 MG tablet Take 25 mg by mouth every 6 hours  11/4/21   Ar Automatic Reconciliation   insulin NPH (HUMULIN N;NOVOLIN N) 100 UNIT/ML injection vial Inject 10 Units into the skin every morning     Ar Automatic Reconciliation   insulin regular (HUMULIN R;NOVOLIN R) 100 UNIT/ML injection Inject into the skin Sliding scale    Ar Automatic Reconciliation   ketoconazole (NIZORAL) 2 % cream Apply topically as needed     Ar Automatic Reconciliation   levothyroxine (SYNTHROID) 125 MCG tablet Take by mouth every morning (before breakfast)    Ar Automatic Reconciliation   Lidocaine HCl 2.75 % LOTN Apply topically as needed     Ar Automatic Reconciliation   lisinopril (PRINIVIL;ZESTRIL) 40 MG tablet Take 40 mg by mouth at bedtime  10/22/21   Ar Automatic Reconciliation   loratadine (CLARITIN) 10 MG tablet Strength: 10 mg; Form: tablet; SIG: take 1 tab(s) orally once a day 6/1/15   Ar Automatic Reconciliation   magnesium oxide (MAG-OX) 400 MG tablet Take 500 mg by mouth 2 times daily    Ar Automatic Reconciliation   omeprazole (PRILOSEC) 20 MG delayed release capsule Take 20 mg by mouth 2 times daily    Ar Automatic Reconciliation   ondansetron (ZOFRAN) 4 MG tablet Take 4 mg by mouth every 8 hours as needed    Ar Automatic Reconciliation tiZANidine (ZANAFLEX) 4 MG tablet Take 4 mg by mouth as needed     Ar Automatic Reconciliation   topiramate (TOPAMAX) 100 MG tablet Take by mouth 2 times daily    Ar Automatic Reconciliation   traZODone (DESYREL) 50 MG tablet Take 50 mg by mouth nightly     Ar Automatic Reconciliation     Allergies   Allergen Reactions    Latex Rash     itching    Benzodiazepines Other (See Comments)     Dr. Jason Hill didn't wont medication combined with other medications pt was taking    Diltiazem Other (See Comments)     Other reaction(s): Headache-Intolerance    Nsaids Other (See Comments) and Swelling     Other reaction(s): Other- (not listed) - Allergy  Elevated Blood Pressure; leg edema  Elevated Blood Pressure; leg edema      Amlodipine Other (See Comments)     Other reaction(s): Headache-Intolerance        Review of Systems:  All systems reviewed and negative except as otherwise stated in the HPI    Objective:   Blood pressure (!) 159/68, pulse 65, temperature 97.9 °F (36.6 °C), resp. rate 16, height 5' 7\" (1.702 m), weight 205 lb (93 kg), SpO2 96 %. Visit Vitals  BP (!) 159/68   Pulse 65   Temp 97.9 °F (36.6 °C)   Resp 16   Ht 5' 7\" (1.702 m)   Wt 205 lb (93 kg)   SpO2 96%   BMI 32.11 kg/m²     Temp (24hrs), Av.3 °F (36.8 °C), Min:97.9 °F (36.6 °C), Max:98.8 °F (37.1 °C)       Exam:    General:  Somnolent and not able to move because of generalized pain    Eyes:  Sclera anicteric. Pupils equally round and reactive to light. Mouth/Throat: Mucous membranes normal, oral pharynx clear   Neck: Supple   Lungs:   Clear to auscultation bilaterally, good effort   CV:  Regular rate and rhythm, murmur   Abdomen:   Soft, non-tender.  bowel sounds normal. non-distended   Extremities: Right distal leg hyperemia and erythema   Skin: As above       Musculoskeletal: No swelling or deformity   Lines/Devices:  Intact, no erythema, drainage or tenderness   Psych: Patient not opening eyes but communicating       CBC:  Recent Labs 10/04/22  2207 10/06/22  0313   WBC 12.6* 14.4*   HGB 11.6* 11.6*   HCT 35.7* 35.0*   * 130*       BMP:  Recent Labs     10/04/22  2207 10/06/22  0313   BUN 54* 24*   * 133*   K 4.0 3.3*   CL 96* 100*   CO2 25 23       LFTS:  Recent Labs     10/04/22  2207 10/06/22  0313   ALT 27 23       Data Review:   Recent Results (from the past 24 hour(s))   POCT Glucose    Collection Time: 10/05/22 10:50 AM   Result Value Ref Range    POC Glucose 137 (H) 65 - 100 mg/dL    Performed by: Lila    POCT Glucose    Collection Time: 10/05/22  4:32 PM   Result Value Ref Range    POC Glucose 204 (H) 65 - 100 mg/dL    Performed by: Lila    POCT Glucose    Collection Time: 10/05/22  8:48 PM   Result Value Ref Range    POC Glucose 150 (H) 65 - 100 mg/dL    Performed by: Jerardo    Comprehensive Metabolic Panel w/ Reflex to MG    Collection Time: 10/06/22  3:13 AM   Result Value Ref Range    Sodium 133 (L) 136 - 145 mmol/L    Potassium 3.3 (L) 3.5 - 5.1 mmol/L    Chloride 100 (L) 101 - 110 mmol/L    CO2 23 21 - 32 mmol/L    Anion Gap 10 4 - 13 mmol/L    Glucose 233 (H) 65 - 100 mg/dL    BUN 24 (H) 8 - 23 MG/DL    Creatinine 0.75 0.6 - 1.0 MG/DL    Est, Glom Filt Rate >60 >60 ml/min/1.73m2    Calcium 9.0 8.3 - 10.4 MG/DL    Total Bilirubin 0.6 0.2 - 1.1 MG/DL    ALT 23 12 - 65 U/L    AST 29 15 - 37 U/L    Alk Phosphatase 93 50 - 130 U/L    Total Protein 6.6 6.3 - 8.2 g/dL    Albumin 2.4 (L) 3.2 - 4.6 g/dL    Globulin 4.2 (H) 2.3 - 3.5 g/dL    Albumin/Globulin Ratio 0.6 (L) 1.2 - 3.5     CBC with Auto Differential    Collection Time: 10/06/22  3:13 AM   Result Value Ref Range    WBC 14.4 (H) 4.3 - 11.1 K/uL    RBC 3.89 (L) 4.05 - 5.2 M/uL    Hemoglobin 11.6 (L) 11.7 - 15.4 g/dL    Hematocrit 35.0 (L) 35.8 - 46.3 %    MCV 90.0 79.6 - 97.8 FL    MCH 29.8 26.1 - 32.9 PG    MCHC 33.1 31.4 - 35.0 g/dL    RDW 13.7 11.9 - 14.6 %    Platelets 341 (L) 088 - 450 K/uL    MPV 11.5 9.4 - 12.3 FL    nRBC 0.00 0.0 - 0.2 K/uL    Differential Type AUTOMATED      Seg Neutrophils 79 (H) 43 - 78 %    Lymphocytes 9 (L) 13 - 44 %    Monocytes 11 4.0 - 12.0 %    Eosinophils % 0 (L) 0.5 - 7.8 %    Basophils 0 0.0 - 2.0 %    Immature Granulocytes 1 0.0 - 5.0 %    Segs Absolute 11.4 (H) 1.7 - 8.2 K/UL    Absolute Lymph # 1.2 0.5 - 4.6 K/UL    Absolute Mono # 1.6 (H) 0.1 - 1.3 K/UL    Absolute Eos # 0.0 0.0 - 0.8 K/UL    Basophils Absolute 0.1 0.0 - 0.2 K/UL    Absolute Immature Granulocyte 0.1 0.0 - 0.5 K/UL   Magnesium    Collection Time: 10/06/22  3:13 AM   Result Value Ref Range    Magnesium 2.1 1.8 - 2.4 mg/dL   POCT Glucose    Collection Time: 10/06/22  6:13 AM   Result Value Ref Range    POC Glucose 190 (H) 65 - 100 mg/dL    Performed by: Jerardo    Vancomycin Level, Random    Collection Time: 10/06/22  8:02 AM   Result Value Ref Range    Vancomycin Rm 17.1 UG/ML        Microbiology:  Reviewed    Studies:  Reviewed    Signed By: Laz Mondragon MD     October 6, 2022

## 2022-10-06 NOTE — PROGRESS NOTES
ECHO  - Check CT C/T spine w contrast  - 10/4 CTAP with fluid around spinal stimulator  - Consult ID in setting of recent stimulator placement    Active Problems:    Cellulitis of right lower extremity  - Unsure if true cellulitis  - Has well demarcated lines and patient/ states it's been there a while  - Had right second to wound - followed by podiatry as OP  - Continue Zosyn      HTN (hypertension)  - All BP meds held due to hypotension in ER  - BP now up  - Restart Coreg now  - Restart other BP meds as appropriate      Type 2 diabetes mellitus without complication (HCC)  - Stable  - Continue Humalog SSI      Chronic pain syndrome  - States that pain is a lot worse over past 4 weeks  - S/P spinal stimulator on 6/23/22  - Continue home Percocet 10  - Continue Dilaudid PRN      S/P insertion of spinal cord stimulator      Opioid dependence ([address]  - Be mindful of drowsiness and respiratory depression      Fibromyalgia      Generalized anxiety disorder      HEIDY (obstructive sleep apnea)      Class 1 Obesity    Diet:  ADULT DIET; Regular  DVT PPx: Lovenox  Code status: Full Code    Hospital Problems:  Principal Problem:    Bacteremia due to Gram-positive bacteria  Active Problems:    Cellulitis of right lower extremity    HTN (hypertension)    Paroxysmal atrial fibrillation (HCC)    Type 2 diabetes mellitus without complication (HCC)    Chronic pain syndrome    S/P insertion of spinal cord stimulator    Opioid dependence (HCC)    Fibromyalgia    Generalized anxiety disorder    HEIDY (obstructive sleep apnea)    Primary hypothyroidism    Obesity  Resolved Problems:    * No resolved hospital problems.  *      Discharge Plan:     Location: [facility]  Days: 3-4  PPD Ordered: 10/5/22      Objective:   Patient Vitals for the past 24 hrs:   Temp Pulse Resp BP SpO2   10/06/22 0715 97.9 °F (36.6 °C) 65 16 (!) 159/68 96 %   10/06/22 0608 -- -- 18 -- --   10/06/22 0242 98.1 °F (36.7 °C) 87 16 (!) 147/71 93 %   10/06/22 0056 -- -- 18 -- --   10/05/22 2105 -- 80 16 -- 99 %   10/05/22 2044 -- -- 17 -- --   10/05/22 1924 98.1 °F (36.7 °C) 76 16 (!) 142/67 100 %   10/05/22 1516 98.8 °F (37.1 °C) 80 17 (!) 144/77 (!) 89 %   10/05/22 1051 98.4 °F (36.9 °C) 84 16 138/66 100 %       Oxygen Therapy  SpO2: 96 %  Pulse Oximetry Type: Intermittent  Pulse via Oximetry: 94 beats per minute  Pulse Oximeter Device Mode: Intermittent  O2 Device: None (Room air)    Estimated body mass index is 32.11 kg/m² as calculated from the following:    Height as of this encounter: 5' 7\" (1.702 m). Weight as of this encounter: 205 lb (93 kg). No intake or output data in the 24 hours ending 10/06/22 1032      Physical Exam:   Blood pressure (!) 159/68, pulse 65, temperature 97.9 °F (36.6 °C), resp. rate 16, height 5' 7\" (1.702 m), weight 205 lb (93 kg), SpO2 96 %. General:    Well nourished. In moderate distress, moaning  Head:  Normocephalic, atraumatic  Eyes:  Sclerae appear normal.  Pupils equally round. ENT:  Nares appear normal, no drainage. Moist oral mucosa  Neck:  No restricted ROM. Trachea midline   CV:   RRR. No m/r/g. No jugular venous distension. Lungs:   CTAB. No wheezing, rhonchi, or rales. Respirations even, unlabored  Abdomen: Bowel sounds present. Soft, nontender, nondistended. Extremities: No cyanosis or clubbing. No edema  Skin:     RLE with well demarcated area of erythema. Right second toe with wound. Warm and dry. Neuro:  CN II-XII grossly intact. Sensation intact. A&Ox3  Psych:  Normal mood and affect.       I have reviewed ordered lab tests and independently visualized imaging below:    Recent Labs:  Recent Results (from the past 48 hour(s))   Comprehensive Metabolic Panel    Collection Time: 10/04/22 10:07 PM   Result Value Ref Range    Sodium 131 (L) 136 - 145 mmol/L    Potassium 4.0 3.5 - 5.1 mmol/L    Chloride 96 (L) 101 - 110 mmol/L    CO2 25 21 - 32 mmol/L    Anion Gap 10 4 - 13 mmol/L    Glucose 190 (H) 65 - 100 mg/dL BUN 54 (H) 8 - 23 MG/DL    Creatinine 1.45 (H) 0.6 - 1.0 MG/DL    Est, Glom Filt Rate 39 (L) >60 ml/min/1.73m2    Calcium 9.0 8.3 - 10.4 MG/DL    Total Bilirubin 1.0 0.2 - 1.1 MG/DL    ALT 27 12 - 65 U/L    AST 37 15 - 37 U/L    Alk Phosphatase 84 50 - 130 U/L    Total Protein 6.7 6.3 - 8.2 g/dL    Albumin 2.8 (L) 3.2 - 4.6 g/dL    Globulin 3.9 (H) 2.3 - 3.5 g/dL    Albumin/Globulin Ratio 0.7 (L) 1.2 - 3.5     Blood Culture 1    Collection Time: 10/04/22 10:07 PM    Specimen: Blood   Result Value Ref Range    Special Requests NO SPECIAL REQUESTS  LEFT  Antecubital        Gram stain GRAM POSITIVE COCCI      Gram stain AEROBIC AND ANAEROBIC BOTTLES      Gram stain        RESULTS VERIFIED, PHONED TO AND READ BACK BY Sudhir Su RN 3ORTHO AT 0635 ON 10/5/22 HA    Culture (A)       STREPTOCOCCI, BETA HEMOLYTIC GROUP G For identification and susceptibility refer to culture ACCESSION [id number]    Culture CULTURE IN [facility] UPDATES TO FOLLOW     CBC with Auto Differential    Collection Time: 10/04/22 10:07 PM   Result Value Ref Range    WBC 12.6 (H) 4.3 - 11.1 K/uL    RBC 3.87 (L) 4.05 - 5.2 M/uL    Hemoglobin 11.6 (L) 11.7 - 15.4 g/dL    Hematocrit 35.7 (L) 35.8 - 46.3 %    MCV 92.2 79.6 - 97.8 FL    MCH 30.0 26.1 - 32.9 PG    MCHC 32.5 31.4 - 35.0 g/dL    RDW 13.8 11.9 - 14.6 %    Platelets 516 (L) 586 - 450 K/uL    MPV 11.1 9.4 - 12.3 FL    nRBC 0.00 0.0 - 0.2 K/uL    Seg Neutrophils 77 (H) 47 - 75 %    Bands 17 (H) 0 - 6 %    Lymphocytes 5 (L) 16 - 44 %    Monocytes 1 (L) 3 - 9 %    Segs Absolute 11.9 (H) 1.7 - 8.2 K/UL    Absolute Lymph # 0.6 0.5 - 4.6 K/UL    Absolute Mono # 0.1 0.1 - 1.3 K/UL    RBC Comment NORMOCYTIC/NORMOCHROMIC      WBC Comment RARE      Platelet Comment ADEQUATE      Differential Type AUTOMATED     Lactate, Sepsis    Collection Time: 10/04/22 10:07 PM   Result Value Ref Range    Lactic Acid, Sepsis 1.3 0.5 - 2.0 MMOL/L   Procalcitonin    Collection Time: 10/04/22 10:07 PM   Result Value Ref Range    Procalcitonin 15.70 (H) 0.00 - 0.49 ng/mL   Rapid influenza A/B antigens    Collection Time: 10/04/22 10:07 PM    Specimen: Nasal Washing   Result Value Ref Range    Influenza A Ag Negative NEG      Influenza B Ag Negative NEG      Comments HIDE     Source Nasopharyngeal     COVID-19, Rapid    Collection Time: 10/04/22 10:07 PM    Specimen: Nasopharyngeal   Result Value Ref Range    Source Nasopharyngeal      SARS-CoV-2, Rapid Not detected NOTD     Blood Culture 2    Collection Time: 10/04/22 10:12 PM    Specimen: Blood   Result Value Ref Range    Special Requests NO SPECIAL REQUESTS  RIGHT  ARM        Gram stain AEROBIC AND ANAEROBIC BOTTLES      Gram stain GRAM POSITIVE COCCI      Gram stain        RESULTS VERIFIED, PHONED TO AND READ BACK BY Andrew Elias RN 3ORTHO AT 2498 ON 10/5/22 HA    Culture (A)       STREPTOCOCCI, BETA HEMOLYTIC GROUP G IDENTIFICATION AND SUSCEPTIBILITY TO FOLLOW    Culture        Refer to Blood Culture ID Panel Accession [id number]   Urinalysis w rflx microscopic    Collection Time: 10/04/22 10:12 PM   Result Value Ref Range    Color, UA YELLOW/STRAW      Appearance CLOUDY      Specific [place], UA 1.016 1.001 - 1.023      pH, Urine 5.5 5.0 - 9.0      Protein, UA 30 (A) NEG mg/dL    Glucose, UA Negative mg/dL    Ketones, Urine 15 (A) NEG mg/dL    Bilirubin Urine Negative NEG      Blood, Urine MODERATE (A) NEG      Urobilinogen, Urine 1.0 0.2 - 1.0 EU/dL    Nitrite, Urine Negative NEG      Leukocyte Esterase, Urine TRACE (A) NEG      WBC, UA 0-3 0 /hpf    RBC, UA 0-3 0 /hpf    Epithelial Cells UA 3-5 0 /hpf    BACTERIA, URINE TRACE 0 /hpf    Mucus, UA 1+ (H) 0 /lpf   Culture, Urine    Collection Time: 10/04/22 10:12 PM    Specimen: Urine, clean catch    URINE   Result Value Ref Range    Special Requests NO SPECIAL REQUESTS      Culture        No growth after short period of incubation. Further results to follow after overnight incubation.    Culture, Blood, PCR ID Panel Collection Time: 10/04/22 10:12 PM    Specimen: Blood   Result Value Ref Range    Accession Number [id number]     Enterococcus faecalis by PCR NOT DETECTED NOTDET      Enterococcus faecium by PCR NOT DETECTED NOTDET      Listeria monocytogenes by PCR NOT DETECTED NOTDET      STAPHYLOCOCCUS NOT DETECTED NOTDET      Staphylococcus Aureus NOT DETECTED NOTDET      Staphylococcus epidermidis by PCR NOT DETECTED NOTDET      Staphylococcus lugdunensis by PCR NOT DETECTED NOTDET      STREPTOCOCCUS Detected (A) NOTDET      Streptococcus agalactiae (Group B) NOT DETECTED NOTDET      Strep pneumoniae NOT DETECTED NOTDET      Strep pyogenes,(Grp. A) NOT DETECTED NOTDET      Acinetobacter calcoac baumannii complex by PCR NOT DETECTED NOTDET      Bacteroides fragilis by PCR NOT DETECTED NOTDET      Enterobacteriaceae by PCR NOT DETECTED NOTDET      Enterobacter cloacae complex by PCR NOT DETECTED NOTDET      Escherichia Coli NOT DETECTED NOTDET      Klebsiella aerogenes by PCR NOT DETECTED NOTDET      Klebsiella oxytoca by PCR NOT DETECTED NOTDET      Klebsiella pneumoniae group by PCR NOT DETECTED NOTDET      Proteus by PCR NOT DETECTED NOTDET      Salmonella species by PCR NOT DETECTED NOTDET      Serratia marcescens by PCR NOT DETECTED NOTDET      Haemophilus Influenzae by PCR NOT DETECTED NOTDET      Neisseria meningitidis by PCR NOT DETECTED NOTDET      Pseudomonas aeruginosa NOT DETECTED NOTDET      Stenotrophomonas maltophilia by PCR NOT DETECTED NOTDET      Candida albicans by PCR NOT DETECTED NOTDET      Candida auris by PCR NOT DETECTED NOTDET      Candida glabrata NOT DETECTED NOTDET      Candida krusei by PCR NOT DETECTED NOTDET      Candida parapsilosis by PCR NOT DETECTED NOTDET      Candida tropicalis by PCR NOT DETECTED NOTDET      Cryptococcus neoformans/gattii by PCR NOT DETECTED NOTDET      Resistant gene targets          [employer] test comment        False positive results may rarely occur.  Correlate with clinical,epidemiologic, and other laboratory findings   EKG 12 Lead    Collection Time: 10/04/22 10:19 PM   Result Value Ref Range    Ventricular Rate 78 BPM    Atrial Rate 78 BPM    P-R Interval 154 ms    QRS Duration 92 ms    Q-T Interval 392 ms    QTc Calculation (Bazett) 446 ms    P Axis -8 degrees    R Axis 0 degrees    T Axis -11 degrees    Diagnosis Normal sinus rhythm    POCT Glucose    Collection Time: 10/05/22 10:50 AM   Result Value Ref Range    POC Glucose 137 (H) 65 - 100 mg/dL    Performed by: Lila    POCT Glucose    Collection Time: 10/05/22  4:32 PM   Result Value Ref Range    POC Glucose 204 (H) 65 - 100 mg/dL    Performed by: Lila    POCT Glucose    Collection Time: 10/05/22  8:48 PM   Result Value Ref Range    POC Glucose 150 (H) 65 - 100 mg/dL    Performed by: Jerardo    Comprehensive Metabolic Panel w/ Reflex to MG    Collection Time: 10/06/22  3:13 AM   Result Value Ref Range    Sodium 133 (L) 136 - 145 mmol/L    Potassium 3.3 (L) 3.5 - 5.1 mmol/L    Chloride 100 (L) 101 - 110 mmol/L    CO2 23 21 - 32 mmol/L    Anion Gap 10 4 - 13 mmol/L    Glucose 233 (H) 65 - 100 mg/dL    BUN 24 (H) 8 - 23 MG/DL    Creatinine 0.75 0.6 - 1.0 MG/DL    Est, Glom Filt Rate >60 >60 ml/min/1.73m2    Calcium 9.0 8.3 - 10.4 MG/DL    Total Bilirubin 0.6 0.2 - 1.1 MG/DL    ALT 23 12 - 65 U/L    AST 29 15 - 37 U/L    Alk Phosphatase 93 50 - 130 U/L    Total Protein 6.6 6.3 - 8.2 g/dL    Albumin 2.4 (L) 3.2 - 4.6 g/dL    Globulin 4.2 (H) 2.3 - 3.5 g/dL    Albumin/Globulin Ratio 0.6 (L) 1.2 - 3.5     CBC with Auto Differential    Collection Time: 10/06/22  3:13 AM   Result Value Ref Range    WBC 14.4 (H) 4.3 - 11.1 K/uL    RBC 3.89 (L) 4.05 - 5.2 M/uL    Hemoglobin 11.6 (L) 11.7 - 15.4 g/dL    Hematocrit 35.0 (L) 35.8 - 46.3 %    MCV 90.0 79.6 - 97.8 FL    MCH 29.8 26.1 - 32.9 PG    MCHC 33.1 31.4 - 35.0 g/dL    RDW 13.7 11.9 - 14.6 %    Platelets 679 (L) 882 - 450 K/uL    MPV 11.5 9.4 - 12.3 FL nRBC 0.00 0.0 - 0.2 K/uL    Differential Type AUTOMATED      Seg Neutrophils 79 (H) 43 - 78 %    Lymphocytes 9 (L) 13 - 44 %    Monocytes 11 4.0 - 12.0 %    Eosinophils % 0 (L) 0.5 - 7.8 %    Basophils 0 0.0 - 2.0 %    Immature Granulocytes 1 0.0 - 5.0 %    Segs Absolute 11.4 (H) 1.7 - 8.2 K/UL    Absolute Lymph # 1.2 0.5 - 4.6 K/UL    Absolute Mono # 1.6 (H) 0.1 - 1.3 K/UL    Absolute Eos # 0.0 0.0 - 0.8 K/UL    Basophils Absolute 0.1 0.0 - 0.2 K/UL    Absolute Immature Granulocyte 0.1 0.0 - 0.5 K/UL   Magnesium    Collection Time: 10/06/22  3:13 AM   Result Value Ref Range    Magnesium 2.1 1.8 - 2.4 mg/dL   POCT Glucose    Collection Time: 10/06/22  6:13 AM   Result Value Ref Range    POC Glucose 190 (H) 65 - 100 mg/dL    Performed by: Jerardo    Vancomycin Level, Random    Collection Time: 10/06/22  8:02 AM   Result Value Ref Range    Vancomycin Rm 17.1 UG/ML       Other Studies:  CT ABDOMEN PELVIS W IV CONTRAST Additional Contrast? None    Result Date: 10/5/2022  EXAM: CT ABDOMEN PELVIS W IV CONTRAST HISTORY: sepsis. TECHNIQUE: Axial images of the abdomen and pelvis were performed following the administration of intravenous contrast. Images were obtained in the axial plane and coronal reformatted images were created and reviewed. Dose reduction technique used: Automated exposure control/Adjustment of the mA and/or kV according to patient size/Use of iterative reconstruction technique. 100 mL of Isovue-370 were utilized. COMPARISON: CT chest dated 9/12/2009. FINDINGS: The visualized lung bases show mild dependent atelectasis. The visualized mediastinum appears unremarkable. The liver, spleen, adrenal glands, and kidneys are within normal limits. The bladder appears grossly normal. The pancreas appears atrophic. The gallbladder is dilated. It measures up to 6.1 cm transversely. It contains at least one radiopaque stone. Distal esophagus appears unremarkable. The patient is status post gastric bypass. Small and large bowel show no evidence of obstruction. A structure felt to represent the appendix is seen in the right lower quadrant and appears grossly normal. No evidence of free fluid or free air. No pathologic adenopathy. No abdominal aortic aneurysm. Osseous structures show no evidence of acute fracture or suspicious lesion. There is a small fat-containing umbilical hernia. Spinal stimulator leads are in place at the level of the thoracic spine. There is a small fluid collection surrounding the subcutaneous portion. The gallbladder is dilated. It measures up to 6.1 cm transversely. It contains at least one radiopaque stone. There is no appreciable pericholecystic fluid or obvious wall thickening. Spinal stimulator leads are in place at the level of the thoracic spine. There is a small fluid collection surrounding the subcutaneous portion. The patient is status post gastric bypass. XR CHEST PORTABLE    Result Date: 10/4/2022  CHEST X-RAY, single portable view  10/4/2022 History: Fall. Technique: Single frontal view of the chest. Comparison: Chest x-ray 12/14/2010 Findings: Today's study is limited given that the patient is imaged obliqued to the right. The cardiac silhouette is not clearly enlarged given AP technique of this exam.  There is an asymmetric appearance of the left hilum which may be related to the patient's positioning. Similar prominence has been seen such as on a prior comparison dated 12/11/2010 therefore this is not highly suspicious. The lungs are expanded without evidence for pneumothorax. No consolidative airspace process or pleural effusion is seen. 1.  Limited study without significant acute changes evident.        Current Meds:  Current Facility-Administered Medications   Medication Dose Route Frequency    vancomycin (VANCOCIN) 750 mg in sodium chloride 0.9 % 250 mL IVPB ([id number])  750 mg IntraVENous Q8H    spironolactone (ALDACTONE) tablet 25 mg  25 mg Oral Daily gabapentin (NEURONTIN) capsule 300 mg  300 mg Oral TID    DULoxetine (CYMBALTA) extended release capsule 60 mg  60 mg Oral Nightly    carvedilol (COREG) tablet 6.25 mg  6.25 mg Oral BID     [START ON 10/7/2022] levothyroxine (SYNTHROID) tablet 125 mcg  125 mcg Oral Daily    pantoprazole (PROTONIX) tablet 40 mg  40 mg Oral QAM AC    ezetimibe (ZETIA) tablet 10 mg  10 mg Oral Nightly    sodium chloride flush 0.9 % injection 5-40 mL  5-40 mL IntraVENous 2 times per day    sodium chloride flush 0.9 % injection 5-40 mL  5-40 mL IntraVENous PRN    0.9 % sodium chloride infusion   IntraVENous PRN    enoxaparin (LOVENOX) injection 40 mg  40 mg SubCUTAneous Daily    ondansetron (ZOFRAN-ODT) disintegrating tablet 4 mg  4 mg Oral Q8H PRN    Or    ondansetron (ZOFRAN) injection 4 mg  4 mg IntraVENous Q6H PRN    polyethylene glycol (GLYCOLAX) packet 17 g  17 g Oral Daily PRN    acetaminophen (TYLENOL) tablet 650 mg  650 mg Oral Q6H PRN    Or    acetaminophen (TYLENOL) suppository 650 mg  650 mg Rectal Q6H PRN    piperacillin-tazobactam (ZOSYN) 3,375 mg in sodium chloride 0.9 % 50 mL IVPB (mini-bag)  3,375 mg IntraVENous Q8H    insulin lispro (HUMALOG) injection vial 0-8 Units  0-8 Units SubCUTAneous TID     insulin lispro (HUMALOG) injection vial 0-4 Units  0-4 Units SubCUTAneous Nightly    glucose chewable tablet 16 g  4 tablet Oral PRN    dextrose bolus 10% 125 mL  125 mL IntraVENous PRN    Or    dextrose bolus 10% 250 mL  250 mL IntraVENous PRN    glucagon (rDNA) injection 1 mg  1 mg SubCUTAneous PRN    dextrose 10 % infusion   IntraVENous Continuous PRN    aspirin chewable tablet 81 mg  81 mg Oral Nightly    traZODone (DESYREL) tablet 50 mg  50 mg Oral Nightly    topiramate (TOPAMAX) tablet 100 mg  100 mg Oral BID    oxyCODONE-acetaminophen (PERCOCET)  MG per tablet 1 tablet  1 tablet Oral Q6H PRN    tiZANidine (ZANAFLEX) tablet 4 mg  4 mg Oral Q12H PRN    HYDROmorphone HCl PF (DILAUDID) injection 0.5 mg  0.5 mg IntraVENous Q3H PRN    tuberculin injection 5 Units  5 Units IntraDERmal Once       Signed:  Marin Whittington DO  10/5/22

## 2022-10-06 NOTE — PROGRESS NOTES
ACUTE PHYSICAL THERAPY GOALS:   (Developed with and agreed upon by patient and/or caregiver.)  Goals set based on prior level of function. STG:  (1.)Ms. Frank Jim will move from supine to sit and sit to supine  with MINIMAL ASSIST within 5 treatment day(s). (2.)Ms. Frank Jim will transfer from bed to chair and chair to bed with MINIMAL ASSIST using the least restrictive device within 5 treatment day(s). (3.)Ms. Frank Jim will ambulate with MINIMAL ASSIST for 10 feet with the least restrictive device within 5 treatment day(s). LTG:  (1.)Ms. Frank Jim will move from supine to sit and sit to supine  in bed with STAND BY ASSIST within 10 treatment day(s). (2.)Ms. Frank Jim will transfer from bed to chair and chair to bed with STAND BY ASSIST using the least restrictive device within 10 treatment day(s). (3.)Ms. Frank Jim will ambulate with STAND BY ASSIST for 50 feet with the least restrictive device within 10 treatment day(s). PHYSICAL THERAPY: Daily Note AM   (Link to Caseload Tracking: PT Visit Days : 2  Time In/Out PT Charge Capture  Rehab Caseload Tracker  Orders    Liss Chong is a 69 y.o. female   PRIMARY DIAGNOSIS: Bacteremia due to Gram-positive bacteria  Cellulitis [L03.90]  JOSE (acute kidney injury) ([facility] [address] [N17.9]  Cellulitis of right lower extremity [L03.115]  Sepsis, due to unspecified organism, unspecified whether acute organ dysfunction present ([facility] [address] [A41.9]  Bacteremia due to Gram-negative bacteria [R78.81]       Inpatient: Payor: MEDICARE / Plan: MEDICARE PART A AND B / Product Type: *No Product type* /     ASSESSMENT:     REHAB RECOMMENDATIONS:   Recommendation to date pending progress:  Setting:  Short-term Rehab    Equipment:    To Be Determined     ASSESSMENT:  Ms. Frank Jim with little tolerance for any activity. She remains easily agitated and confused. She needed lots of encouragement to work with therapy but then noted her brief was saturated.   Patient changed with maximum assist of 2 as she was very resistant to rolling L or R and hollered out \"no\" multiple times. Patient reports she has a spinal stimulator (turned on low) and head, neck, and back pain. She was too agitated after getting her cleaned up to attempt any further activity. Recommending STR at d/c as not sure if or how soon she'll get back to a reported baseline of independence with mobility. SUBJECTIVE:   Ms. Anya Yi states, \"I need to see the doctor first\".      Social/Functional Lives With: Spouse  Type of Home: House  Home Layout: Multi-level, Bed/Bath upstairs  Home Access: Stairs to enter without rails  Entrance Stairs - Number of Steps: 3  Receives Help From: Family  ADL Assistance: [address]: Needs assistance  Homemaking Responsibilities: No  Ambulation Assistance: Independent  Active : No (has a license but doesn't drive)  Occupation: Retired  OBJECTIVE:     PAIN: VITALS / O2: PRECAUTION / V[facility] Hams / Ana Edis:   Pre Treatment:   Pain Assessment: Face, Legs, Activity, Cry, and Consolability (FLACC)      Post Treatment: 5 Vitals        Oxygen    IV    RESTRICTIONS/PRECAUTIONS:  Restrictions/Precautions  Restrictions/Precautions: Fall Risk  Restrictions/Precautions: Fall Risk     MOBILITY: I Mod I S SBA CGA Min Mod Max Total  NT x2 Comments:   Bed Mobility    Rolling [] [] [] [] [] [] [] [x] [] [] [x]    Supine to Sit [] [] [] [] [] [] [] [] [] [] []    Scooting [] [] [] [] [] [] [] [] [x] [] [x]    Sit to Supine [] [] [] [] [] [] [] [] [] [] []    Transfers    Sit to Stand [] [] [] [] [] [] [] [] [] [] []    Bed to Chair [] [] [] [] [] [] [] [] [] [] []    Stand to Sit [] [] [] [] [] [] [] [] [] [] []     [] [] [] [] [] [] [] [] [] [] []    I=Independent, Mod I=Modified Independent, S=Supervision, SBA=Standby Assistance, CGA=Contact Guard Assistance,   Min=Minimal Assistance, Mod=Moderate Assistance, Max=Maximal Assistance, Total=Total Assistance, NT=Not Tested    BALANCE: Good Fair+ Fair Fair- Poor NT Comments   Sitting Static [] [] [] [] [] [x]    Sitting Dynamic [] [] [] [] [] [x]              Standing Static [] [] [] [] [] [x]    Standing Dynamic [] [] [] [] [] [x]      GAIT: I Mod I S SBA CGA Min Mod Max Total  NT x2 Comments:   Level of Assistance [] [] [] [] [] [] [] [] [] [x] []    Distance   feet    DME N/A    Gait Quality N/A    Weightbearing Status      Stairs      I=Independent, Mod I=Modified Independent, S=Supervision, SBA=Standby Assistance, CGA=Contact Guard Assistance,   Min=Minimal Assistance, Mod=Moderate Assistance, Max=Maximal Assistance, Total=Total Assistance, NT=Not Tested    PLAN:   FREQUENCY AND DURATION: Daily for duration of hospital stay or until stated goals are met, whichever comes first.    TREATMENT:   TREATMENT:   Therapeutic Activity (25 Minutes): Therapeutic activity included Rolling and Scooting to improve functional Activity tolerance, Coordination, Mobility, Strength, and ROM.     TREATMENT GRID:  N/A    AFTER TREATMENT PRECAUTIONS: Alarm Activated, Bed, Bed/Chair Locked, Call light within reach, and Needs within reach    INTERDISCIPLINARY COLLABORATION:  RN/ PCT and OT/ CASTILLO    EDUCATION: Education Given To: Patient  Education Provided: Role of Therapy;Plan of Care  Education Method: Verbal  Education Outcome: Continued education needed    TIME IN/OUT:  Time In: 1055  Time Out: [address]  Minutes: [facility], PT

## 2022-10-06 NOTE — PROGRESS NOTES
ACUTE OCCUPATIONAL THERAPY GOALS:   (Developed with and agreed upon by patient and/or caregiver.)  . 1. Patient will perform grooming with min assist.   2. Patient will perform rolling mod assist to assist with stefani-care and adls. 3. Patient will perform upper body bathing with mod assist.       Goals to be achieved in 7 days. OCCUPATIONAL THERAPY Initial Assessment and AM       OT Visit Days: 1  Acknowledge Orders  Time  OT Charge Capture  Rehab Caseload Tracker      Susan Hill is a 69 y.o. female   PRIMARY DIAGNOSIS: Bacteremia due to Gram-positive bacteria  Cellulitis [L03.90]  JOSE (acute kidney injury) ([facility] [address] [N17.9]  Cellulitis of right lower extremity [L03.115]  Sepsis, due to unspecified organism, unspecified whether acute organ dysfunction present ([facility] [address] [A41.9]  Bacteremia due to Gram-negative bacteria [R78.81]       Reason for Referral: Generalized Muscle Weakness (M62.81)  Other lack of cordination (R27.8)  Inpatient: Payor: MEDICARE / Plan: MEDICARE PART A AND B / Product Type: *No Product type* /     ASSESSMENT:     REHAB RECOMMENDATIONS:   Recommendation to date pending progress:  Setting:  Short-term Rehab    Equipment:    To Be Determined     ASSESSMENT:  Ms. Samson Holbrook admitted with above diagnosis. Pt presents with decreased self care, functional mobility and strength. Pt would benefit from skilled OT to increase independence. Pt confused and agitated this am. Pt with saturated brief. Pt max assist x 2 to roll to left and right for stefani-care cleaning. Pt total assist to don/doff brief. Pt reports pain in her neck, back and head. Pt with spinal stimulator. Pt too agitated to try sitting edge of bed. Recommend STR at d/c.        [address] [facility] [address] [id number] AM-PAC 6 Clicks Daily Activity Inpatient Short Form:    AM-PAC Daily Activity Inpatient   How much help for putting on and taking off regular lower body clothing?: Total  How much help for Bathing?: Total  How much help for Toileting?: Total  How much help for putting on and taking off regular upper body clothing?: A Lot  How much help for taking care of personal grooming?: A Lot  How much help for eating meals?: A Little  AM-PAC Inpatient Daily Activity Raw Score: 10  AM-PAC Inpatient ADL T-Scale Score : 27.31  ADL Inpatient CMS 0-100% Score: 74.7  ADL Inpatient CMS G-Code Modifier : CL           SUBJECTIVE:     Ms. Elizabeth Armstrong states, \"I can't do this\"     Social/Functional Lives With: Spouse  Type of Home: House  Home Layout: Multi-level, Bed/Bath upstairs  Home Access: Stairs to enter without rails  Entrance Stairs - Number of Steps: 3  Receives Help From: Family  ADL Assistance: Independent  Homemaking Assistance: Needs assistance  Homemaking Responsibilities: No  Ambulation Assistance: Independent  Active : No (has a license but doesn't drive)  Occupation: Retired    OBJECTIVE:     LINES / Arthurine Ana / Mendel Gm: IV    RESTRICTIONS/PRECAUTIONS:  Restrictions/Precautions: Fall Risk    PAIN: Feng Holter / O2:   Pre Treatment:   Pain Assessment: Face, Legs, Activity, Cry, and Consolability (FLACC)  Pain Level: 3  Patient's Stated Pain Goal: 2  Pain Location: Back, Neck, Head  Pain Descriptors: Aching  Non-Pharmaceutical Pain Intervention(s): Repositioned      Post Treatment: 3/10       Vitals          Oxygen            GROSS EVALUATION: INTACT IMPAIRED   (See Comments)   UE AROM [] []Decreased BUE   UE PROM [] []   Strength []  Decreased BUE     Posture / Balance []     Sensation [x]  UEs   Coordination []  decreased     Tone [x]       Edema []    Activity Tolerance []  Poor     Hand Dominance R [] L []      COGNITION/  PERCEPTION: INTACT IMPAIRED   (See Comments)   Orientation []  Oriented to person   Vision []  Appears intact    Hearing []  Appears intact   Cognition  []     Perception []       MOBILITY: I Mod I S SBA CGA Min Mod Max Total  NT x2 Comments:   Bed Mobility    Rolling [] [] [] [] [] [] [] [x] [] [] [x]    Supine to Sit [] [] [] [] [] [] [] [] [] [] [] Declined    Scooting [] [] [] [] [] [] [] [] [x] [] [x]    Sit to Supine [] [] [] [] [] [] [] [] [] [] []    Transfers    Sit to Stand [] [] [] [] [] [] [] [] [] [] []    Bed to Chair [] [] [] [] [] [] [] [] [] [] []    Stand to Sit [] [] [] [] [] [] [] [] [] [] []    Tub/Shower [] [] [] [] [] [] [] [] [] [] []     Toilet [] [] [] [] [] [] [] [] [] [] []      [] [] [] [] [] [] [] [] [] [] []    I=Independent, Mod I=Modified Independent, S=Supervision/Setup, SBA=Standby Assistance, CGA=Contact Guard Assistance, Min=Minimal Assistance, Mod=Moderate Assistance, Max=Maximal Assistance, Total=Total Assistance, NT=Not Tested    ACTIVITIES OF DAILY LIVING: I Mod I S SBA CGA Min Mod Max Total NT Comments   BASIC ADLs:              Upper Body Bathing  [] [] [] [] [] [] [] [] [] [x]    Lower Body Bathing [] [] [] [] [] [] [] [] [] [x]    Toileting [] [] [] [] [] [] [] [] [x] []    Upper Body Dressing [] [] [] [] [] [] [] [] [] []    Lower Body Dressing [] [] [] [] [] [] [] [] [x] [] brief   Feeding [] [] [] [] [] [x] [] [] [] []    Grooming [] [] [] [] [] [] [] [] [] []    Personal Device Care [] [] [] [] [] [] [] [] [] []    Functional Mobility [] [] [] [] [] [] [] [] [] []    I=Independent, Mod I=Modified Independent, S=Supervision/Setup, SBA=Standby Assistance, CGA=Contact Guard Assistance, Min=Minimal Assistance, Mod=Moderate Assistance, Max=Maximal Assistance, Total=Total Assistance, NT=Not Tested    PLAN:   FREQUENCY/DURATION   OT Plan of Care: 3 times/week for duration of hospital stay or until stated goals are met, whichever comes first.    PROBLEM LIST:   (Skilled intervention is medically necessary to address:)  Decreased ADL/Functional Activities  Decreased Activity Tolerance  Decreased AROM/PROM  Decreased Balance  Decreased Cognition  Decreased Strength  Decreased Transfer Abilities   INTERVENTIONS PLANNED:  (Benefits and precautions of occupational therapy have been discussed with the patient.)  Self Care Training  Therapeutic Activity  Therapeutic Exercise/HEP  Neuromuscular Re-education  Manual Therapy  Education         TREATMENT:     EVALUATION: LOW COMPLEXITY: (Untimed Charge)    TREATMENT:   Self Care (25 minutes): Patient participated in toileting and stefani-care ADLs in supine with moderate verbal cueing to increase activity tolerance. Patient also participated in functional mobility training to increase activity tolerance.      TREATMENT GRID:  N/A    AFTER TREATMENT PRECAUTIONS: Bed, Bed/Chair Locked, Call light within reach, Needs within reach, Side rails x3, and Visitors at bedside    INTERDISCIPLINARY COLLABORATION:  RN/ PCT, PT/ PTA, and OT/ CASTILLO    EDUCATION:  Education Given To: Patient  Education Provided: Role of Therapy  Education Method: Verbal  Barriers to Learning: Cognition  Education Outcome: Continued education needed    TOTAL TREATMENT DURATION AND TIME:  Time In: 1055  Time Out: [address]  Minutes: [address], [place]

## 2022-10-07 LAB
ANION GAP SERPL CALC-SCNC: 9 MMOL/L (ref 4–13)
BACTERIA SPEC CULT: ABNORMAL
BACTERIA SPEC CULT: NORMAL
BUN SERPL-MCNC: 21 MG/DL (ref 8–23)
CALCIUM SERPL-MCNC: 8.6 MG/DL (ref 8.3–10.4)
CHLORIDE SERPL-SCNC: 100 MMOL/L (ref 101–110)
CO2 SERPL-SCNC: 26 MMOL/L (ref 21–32)
CREAT SERPL-MCNC: 0.68 MG/DL (ref 0.6–1)
ERYTHROCYTE [DISTWIDTH] IN BLOOD BY AUTOMATED COUNT: 13.9 % (ref 11.9–14.6)
GLUCOSE BLD STRIP.AUTO-MCNC: 172 MG/DL (ref 65–100)
GLUCOSE BLD STRIP.AUTO-MCNC: 248 MG/DL (ref 65–100)
GLUCOSE BLD STRIP.AUTO-MCNC: 251 MG/DL (ref 65–100)
GLUCOSE BLD STRIP.AUTO-MCNC: 259 MG/DL (ref 65–100)
GLUCOSE SERPL-MCNC: 200 MG/DL (ref 65–100)
GRAM STN SPEC: ABNORMAL
HCT VFR BLD AUTO: 37.1 % (ref 35.8–46.3)
HGB BLD-MCNC: 12.1 G/DL (ref 11.7–15.4)
MCH RBC QN AUTO: 30.2 PG (ref 26.1–32.9)
MCHC RBC AUTO-ENTMCNC: 32.6 G/DL (ref 31.4–35)
MCV RBC AUTO: 92.5 FL (ref 79.6–97.8)
MM INDURATION, POC: 0 MM (ref 0–5)
NRBC # BLD: 0 K/UL (ref 0–0.2)
PLATELET # BLD AUTO: 134 K/UL (ref 150–450)
PMV BLD AUTO: 11.6 FL (ref 9.4–12.3)
POTASSIUM SERPL-SCNC: 3.7 MMOL/L (ref 3.5–5.1)
PPD, POC: NEGATIVE
RBC # BLD AUTO: 4.01 M/UL (ref 4.05–5.2)
SERVICE CMNT-IMP: ABNORMAL
SERVICE CMNT-IMP: NORMAL
SODIUM SERPL-SCNC: 135 MMOL/L (ref 136–145)
WBC # BLD AUTO: 14 K/UL (ref 4.3–11.1)

## 2022-10-07 PROCEDURE — 6370000000 HC RX 637 (ALT 250 FOR IP): Performed by: HOSPITALIST

## 2022-10-07 PROCEDURE — 2580000003 HC RX 258: Performed by: HOSPITALIST

## 2022-10-07 PROCEDURE — 1100000000 HC RM PRIVATE

## 2022-10-07 PROCEDURE — 85027 COMPLETE CBC AUTOMATED: CPT

## 2022-10-07 PROCEDURE — 6360000002 HC RX W HCPCS: Performed by: HOSPITALIST

## 2022-10-07 PROCEDURE — 82962 GLUCOSE BLOOD TEST: CPT

## 2022-10-07 PROCEDURE — 6370000000 HC RX 637 (ALT 250 FOR IP): Performed by: INTERNAL MEDICINE

## 2022-10-07 PROCEDURE — 80048 BASIC METABOLIC PNL TOTAL CA: CPT

## 2022-10-07 PROCEDURE — 6360000002 HC RX W HCPCS: Performed by: INTERNAL MEDICINE

## 2022-10-07 PROCEDURE — 2580000003 HC RX 258: Performed by: INTERNAL MEDICINE

## 2022-10-07 PROCEDURE — 97530 THERAPEUTIC ACTIVITIES: CPT

## 2022-10-07 RX ORDER — AMOXICILLIN 500 MG/1
1000 CAPSULE ORAL EVERY 8 HOURS SCHEDULED
Status: DISCONTINUED | OUTPATIENT
Start: 2022-10-07 | End: 2022-10-07

## 2022-10-07 RX ADMIN — AMOXICILLIN 1000 MG: 500 CAPSULE ORAL at 09:25

## 2022-10-07 RX ADMIN — OXYCODONE AND ACETAMINOPHEN 1 TABLET: 10; 325 TABLET ORAL at 19:02

## 2022-10-07 RX ADMIN — LEVOTHYROXINE SODIUM 125 MCG: 0.12 TABLET ORAL at 07:49

## 2022-10-07 RX ADMIN — HYDROMORPHONE HYDROCHLORIDE 1 MG: 1 INJECTION, SOLUTION INTRAMUSCULAR; INTRAVENOUS; SUBCUTANEOUS at 12:49

## 2022-10-07 RX ADMIN — OXYCODONE AND ACETAMINOPHEN 1 TABLET: 10; 325 TABLET ORAL at 10:37

## 2022-10-07 RX ADMIN — DULOXETINE HYDROCHLORIDE 60 MG: 60 CAPSULE, DELAYED RELEASE ORAL at 21:43

## 2022-10-07 RX ADMIN — GABAPENTIN 300 MG: 300 CAPSULE ORAL at 13:56

## 2022-10-07 RX ADMIN — SODIUM CHLORIDE, PRESERVATIVE FREE 10 ML: 5 INJECTION INTRAVENOUS at 22:11

## 2022-10-07 RX ADMIN — AMPICILLIN SODIUM 2000 MG: 2 INJECTION, POWDER, FOR SOLUTION INTRAMUSCULAR; INTRAVENOUS at 21:39

## 2022-10-07 RX ADMIN — ASPIRIN 81 MG: 81 TABLET, CHEWABLE ORAL at 21:43

## 2022-10-07 RX ADMIN — HYDROMORPHONE HYDROCHLORIDE 1 MG: 1 INJECTION, SOLUTION INTRAMUSCULAR; INTRAVENOUS; SUBCUTANEOUS at 03:47

## 2022-10-07 RX ADMIN — PANTOPRAZOLE SODIUM 40 MG: 40 TABLET, DELAYED RELEASE ORAL at 07:49

## 2022-10-07 RX ADMIN — HYDROMORPHONE HYDROCHLORIDE 1 MG: 1 INJECTION, SOLUTION INTRAMUSCULAR; INTRAVENOUS; SUBCUTANEOUS at 21:57

## 2022-10-07 RX ADMIN — INSULIN LISPRO 2 UNITS: 100 INJECTION, SOLUTION INTRAVENOUS; SUBCUTANEOUS at 12:48

## 2022-10-07 RX ADMIN — EZETIMIBE 10 MG: 10 TABLET ORAL at 21:43

## 2022-10-07 RX ADMIN — GABAPENTIN 300 MG: 300 CAPSULE ORAL at 21:43

## 2022-10-07 RX ADMIN — AMPICILLIN SODIUM 2000 MG: 2 INJECTION, POWDER, FOR SOLUTION INTRAMUSCULAR; INTRAVENOUS at 16:54

## 2022-10-07 RX ADMIN — SODIUM CHLORIDE, PRESERVATIVE FREE 10 ML: 5 INJECTION INTRAVENOUS at 07:50

## 2022-10-07 RX ADMIN — GABAPENTIN 300 MG: 300 CAPSULE ORAL at 07:49

## 2022-10-07 RX ADMIN — CARVEDILOL 6.25 MG: 6.25 TABLET, FILM COATED ORAL at 16:54

## 2022-10-07 RX ADMIN — TRAZODONE HYDROCHLORIDE 50 MG: 50 TABLET ORAL at 21:43

## 2022-10-07 RX ADMIN — HYDROMORPHONE HYDROCHLORIDE 1 MG: 1 INJECTION, SOLUTION INTRAMUSCULAR; INTRAVENOUS; SUBCUTANEOUS at 07:50

## 2022-10-07 RX ADMIN — VANCOMYCIN HYDROCHLORIDE 750 MG: 750 INJECTION, POWDER, LYOPHILIZED, FOR SOLUTION INTRAVENOUS at 03:15

## 2022-10-07 RX ADMIN — INSULIN LISPRO 4 UNITS: 100 INJECTION, SOLUTION INTRAVENOUS; SUBCUTANEOUS at 16:55

## 2022-10-07 RX ADMIN — AMPICILLIN SODIUM 2000 MG: 2 INJECTION, POWDER, FOR SOLUTION INTRAMUSCULAR; INTRAVENOUS at 12:49

## 2022-10-07 RX ADMIN — SPIRONOLACTONE 25 MG: 25 TABLET ORAL at 07:49

## 2022-10-07 RX ADMIN — TOPIRAMATE 100 MG: 100 TABLET, FILM COATED ORAL at 21:43

## 2022-10-07 RX ADMIN — CARVEDILOL 6.25 MG: 6.25 TABLET, FILM COATED ORAL at 07:49

## 2022-10-07 RX ADMIN — TOPIRAMATE 100 MG: 100 TABLET, FILM COATED ORAL at 07:49

## 2022-10-07 RX ADMIN — ENOXAPARIN SODIUM 40 MG: 100 INJECTION SUBCUTANEOUS at 07:49

## 2022-10-07 ASSESSMENT — PAIN DESCRIPTION - ONSET
ONSET: GRADUAL

## 2022-10-07 ASSESSMENT — PAIN DESCRIPTION - LOCATION
LOCATION: BACK
LOCATION: BACK
LOCATION: BACK;NECK
LOCATION: BACK;NECK
LOCATION: NECK;BACK

## 2022-10-07 ASSESSMENT — PAIN DESCRIPTION - DESCRIPTORS
DESCRIPTORS: ACHING;THROBBING

## 2022-10-07 ASSESSMENT — PAIN SCALES - GENERAL
PAINLEVEL_OUTOF10: 5
PAINLEVEL_OUTOF10: 7
PAINLEVEL_OUTOF10: 8
PAINLEVEL_OUTOF10: 3
PAINLEVEL_OUTOF10: 8
PAINLEVEL_OUTOF10: 8

## 2022-10-07 ASSESSMENT — PAIN DESCRIPTION - ORIENTATION
ORIENTATION: MID

## 2022-10-07 ASSESSMENT — PAIN DESCRIPTION - FREQUENCY
FREQUENCY: CONTINUOUS
FREQUENCY: INTERMITTENT

## 2022-10-07 ASSESSMENT — PAIN DESCRIPTION - PAIN TYPE
TYPE: ACUTE PAIN;CHRONIC PAIN

## 2022-10-07 NOTE — PROGRESS NOTES
Patient received resting in bed. Shift assessment completed. Multiple family members at bedside, will monitor.

## 2022-10-07 NOTE — PROGRESS NOTES
ACUTE PHYSICAL THERAPY GOALS:   (Developed with and agreed upon by patient and/or caregiver.)  Goals set based on prior level of function. STG:  (1.)Ms. Arthur Zapata will move from supine to sit and sit to supine  with MINIMAL ASSIST within 5 treatment day(s). (2.)Ms. Arthur Zapata will transfer from bed to chair and chair to bed with MINIMAL ASSIST using the least restrictive device within 5 treatment day(s). (3.)Ms. Artuhr Zapata will ambulate with MINIMAL ASSIST for 10 feet with the least restrictive device within 5 treatment day(s). LTG:  (1.)Ms. Arthur Zapata will move from supine to sit and sit to supine  in bed with STAND BY ASSIST within 10 treatment day(s). (2.)Ms. Arthur Zapata will transfer from bed to chair and chair to bed with STAND BY ASSIST using the least restrictive device within 10 treatment day(s). (3.)Ms. Arthur Zapata will ambulate with STAND BY ASSIST for 50 feet with the least restrictive device within 10 treatment day(s). PHYSICAL THERAPY: Daily Note PM   (Link to Caseload Tracking: PT Visit Days : 3  Time In/Out PT Charge Capture  Rehab Caseload Tracker  Orders    Elan Morrison is a 69 y.o. female   PRIMARY DIAGNOSIS: Bacteremia due to Gram-positive bacteria  Cellulitis [L03.90]  JOSE (acute [facility]) ([facility] [N17.9]  Cellulitis of right lower extremity [L03.115]  Sepsis, due to unspecified organism, unspecified whether acute organ dysfunction present ([facility] [A41.9]  Bacteremia due to Gram-negative bacteria [R78.81]       Inpatient: Payor: MEDICARE / Plan: MEDICARE PART A AND B / Product Type: *No Product type* /     ASSESSMENT:     REHAB RECOMMENDATIONS:   Recommendation to date pending progress:  Setting:  Short-term Rehab    Equipment:    To Be Determined     ASSESSMENT:  Ms. Arthur Zapata with little tolerance for any activity. She remains easily agitated and confused. She needed lots of encouragement to work with therapy but then noted her brief was saturated.   Patient changed with maximum assist of 2 as she was very resistant to rolling L or R and hollered out \"no\" multiple times. Patient reports she has a spinal stimulator (turned on low) and head, neck, and back pain. She was too agitated after getting her cleaned up to attempt any further activity. Recommending STR at d/c as not sure if or how soon she'll get back to a reported baseline of independence with mobility. 10/7 supine upon arrival.   Work on rolling from L><R  x 5 to get clean up, new brief and bed changed all with Total A, therapist tired to encourage to help by getting hold of the hand handle, but she was in to much pain. Then performs B UE/LE with PROM, again therapist tired to encourage pt to help, but to much pain. Pt needs extra time to work through activities. Remain in the bed with needs in reach and instructed to call for assist, before getting up. 10/7 pm supine upon arrival.  Work on rolling from L><R x 5 to get clean up, new brief and reposition her pad. Therapist tried to get pt to  help with rolling and re-position herself in the bed. Pt had a neck brace on when therapist return. Pt said I hope it will help me have less pain, when I turn. Remain in the bed with needs in reach and instructed to call for assist, before getting up. SUBJECTIVE:   Ms. Maite Irvin states, \"my   brought me a neck brace to see if it would help me, to have less pain when I turn \".     Social/Functional Lives With: Spouse  Type of Home: House  Home Layout: Multi-level, Bed/Bath upstairs  Home Access: Stairs to enter without rails  Entrance Stairs - Number of Steps: 3  Receives Help From: Family  ADL Assistance: Independent  Homemaking Assistance: Needs assistance  Homemaking Responsibilities: No  Ambulation Assistance: Independent  Active : No (has a license but doesn't drive)  Occupation: Retired  OBJECTIVE:     PAIN: VITALS / O2: Parris Drown / Sierra Soila / Redia Melton:   Pre Treatment: 5/10         Post Treatment: about the same Vitals        Oxygen IV    RESTRICTIONS/PRECAUTIONS:  Restrictions/Precautions  Restrictions/Precautions: Fall Risk  Restrictions/Precautions: Fall Risk     MOBILITY: I Mod I S SBA CGA Min Mod Max Total  NT x2 Comments:   Bed Mobility    Rolling [] [] [] [] [] [] [] [] [x] [] [x] Getting clean up and rolling from L><R with Total A    Supine to Sit [] [] [] [] [] [] [] [] [] [x] []    Scooting [] [] [] [] [] [] [] [] [] [x] []    Sit to Supine [] [] [] [] [] [] [] [] [] [x] []    Transfers    Sit to Stand [] [] [] [] [] [] [] [] [] [x] []    Bed to Chair [] [] [] [] [] [] [] [] [] [] []    Stand to Sit [] [] [] [] [] [] [] [] [] [x] []     [] [] [] [] [] [] [] [] [] [x] []    I=Independent, Mod I=Modified Independent, S=Supervision, SBA=Standby Assistance, CGA=Contact Guard Assistance,   Min=Minimal Assistance, Mod=Moderate Assistance, Max=Maximal Assistance, Total=Total Assistance, NT=Not Tested    BALANCE: Good Fair+ Fair Fair- Poor NT Comments   Sitting Static [] [] [] [] [] [x]    Sitting Dynamic [] [] [] [] [] [x]              Standing Static [] [] [] [] [] [x]    Standing Dynamic [] [] [] [] [] [x]      GAIT: I Mod I S SBA CGA Min Mod Max Total  NT x2 Comments:   Level of Assistance [] [] [] [] [] [] [] [] [] [x] []    Distance   feet    DME N/A    Gait Quality N/A    Weightbearing Status      Stairs      I=Independent, Mod I=Modified Independent, S=Supervision, SBA=Standby Assistance, CGA=Contact Guard Assistance,   Min=Minimal Assistance, Mod=Moderate Assistance, Max=Maximal Assistance, Total=Total Assistance, NT=Not Tested    PLAN:   FREQUENCY AND DURATION: Daily for duration of hospital stay or until stated goals are met, whichever comes first.    TREATMENT:   TREATMENT:   Therapeutic Activity (40 Minutes): Therapeutic activity included Rolling to improve functional Activity tolerance, Mobility, and ROM.     TREATMENT GRID:  PROM for B UE/LE Date:  10/7  Date:   Date:     Activity/Exercise Parameters Parameters Parameters Ankle pumps 12     Quad set  12 A     Heel slides 12     Hip ab/ad 12     SAQ 12     Shoulder flex/ext 12     Shoulder ab/ad 12     Elbow flex/ext 12                 AFTER TREATMENT PRECAUTIONS: Alarm Activated, Bed, Bed/Chair Locked, Call light within reach, and Needs within reach    INTERDISCIPLINARY COLLABORATION:  RN/ PCT and PT/ PTA    EDUCATION: Education Given To: Patient  Education Provided: Role of Therapy  Education Method: Demonstration;Verbal    TIME IN/OUT:  Time In: 1300  Time Out: [address]  Minutes: Nate Boone 1560, PTA

## 2022-10-07 NOTE — PROGRESS NOTES
Hospitalist Progress Note   Admit Date:  10/4/2022  9:06 PM   Name:  Disha Garber   Age:  69 y.o. Sex:  female  :  1955   MRN:  [id number]   Room:  [facility]    Presenting Complaint: Back pain and fatigue  Reason(s) for Admission: Cellulitis [L03.90]  JOSE (acute kidney injury) ([facility] [address] [N17.9]  Cellulitis of right lower extremity [L03.115]  Sepsis, due to unspecified organism, unspecified whether acute organ dysfunction present [facility]) [A41.9]  Bacteremia due to Gram-negative bacteria [facility] Course & Interval History:   69year old female with chronic pain status post neurostimulator placement on 22, hypertension, diabetes type 2, fibromyalgia presented to emergency room on 10/5/22 with chief complaint of generalized body ache, generalized weakness, poor appetite for past few days duration with dalia falls. Patient reported low-grade fever at home, history of right lower extremity redness involving just above ankle for past few days duration. Further work-up in emergency room shows evidence of elevated white count, procalcitonin is significantly elevated at 15. Patient was initiated on broad-spectrum antibiotics, chest x-ray did not show any evidence of infiltrates, urinalysis does not show evidence of UTI. Subjective/24hr Events (10/07/22): She complains of migraine headache this morning. Back pain still worse than baseline. She is occasionally moaning and complaining of worsening neck and back pain. Says she doesn't feel right. Denies CP/SOB. Denies abdominal pain. Denies N/V/D. Denies leg pain. Thinks leg erythema is improving. Assessment & Plan:     Principal Problem:    Bacteremia due to Gram-positive bacteria  - Blood cultures from 10/4 with GBS  - Unsure etiology  - Stopped Zosyn  - Stopped Vancomycin  - 10/7 Continue Ampicillin until blood cultures negative  - ? Cellulitis  - UA unremarkable  - Pain stimulator area not red or tender  - CXR clear  - Procalcitonin 15  - 10/6 Repeat blood cultures with GPC  - Repeat blood cultures on 10/8  - Check ECHO  - 10/6 CT C/T spine w contrast with no fluid pockets  - 10/4 CTAP with fluid around spinal stimulator  - Appreciate ID's input and assistance    Active Problems:    Cellulitis of right lower extremity  - Unsure if true cellulitis  - Has well demarcated lines and patient/ states it's been there a while  - Had right second to wound - followed by podiatry as OP  - 10/7 Continue Ampicillin      HTN (hypertension)  - All BP meds held due to hypotension in ER  - BP now up  - Restart Coreg now  - Restart other BP meds as appropriate      Type 2 diabetes mellitus without complication (HCC)  - Stable  - Continue Humalog SSI      Chronic pain syndrome  - States that pain is a lot worse over past 4 weeks  - S/P spinal stimulator on 6/23/22  - Continue home Percocet 10  - Continue Dilaudid PRN      S/P insertion of spinal cord stimulator      Opioid dependence ([address]  - Be mindful of drowsiness and respiratory depression      Fibromyalgia      Generalized anxiety disorder      HEIDY (obstructive sleep apnea)      Class 1 Obesity    Diet:  ADULT DIET; Regular  DVT PPx: Lovenox  Code status: Full Code    Hospital Problems:  Principal Problem:    Bacteremia due to Gram-positive bacteria  Active Problems:    Cellulitis of right lower extremity    HTN (hypertension)    Paroxysmal atrial fibrillation (HCC)    Type 2 diabetes mellitus without complication (HCC)    Chronic pain syndrome    S/P insertion of spinal cord stimulator    Opioid dependence (HCC)    Fibromyalgia    Generalized anxiety disorder    HEIDY (obstructive sleep apnea)    Primary hypothyroidism    Obesity  Resolved Problems:    * No resolved hospital problems.  *      Discharge Plan:     Location: [email or web address]  Days: 3-4  PPD Ordered: 10/5/22      Objective:   Patient Vitals for the past 24 hrs:   Temp Pulse Resp BP SpO2   10/07/22 1249 -- -- 16 -- --   10/07/22 1112 98.5 °F (36.9 °C) 73 (H) 65 - 100 mg/dL    Performed by: Lila    POCT Glucose    Collection Time: 10/05/22  8:48 PM   Result Value Ref Range    POC Glucose 150 (H) 65 - 100 mg/dL    Performed by: Jessica Mathew PPD TEST IN 24 HRS    Collection Time: 10/06/22 12:00 AM   Result Value Ref Range    PPD, (POC)      mm Induration     Comprehensive Metabolic Panel w/ Reflex to MG    Collection Time: 10/06/22  3:13 AM   Result Value Ref Range    Sodium 133 (L) 136 - 145 mmol/L    Potassium 3.3 (L) 3.5 - 5.1 mmol/L    Chloride 100 (L) 101 - 110 mmol/L    CO2 23 21 - 32 mmol/L    Anion Gap 10 4 - 13 mmol/L    Glucose 233 (H) 65 - 100 mg/dL    BUN 24 (H) 8 - 23 MG/DL    Creatinine 0.75 0.6 - 1.0 MG/DL    Est, Glom Filt Rate >60 >60 ml/min/1.73m2    Calcium 9.0 8.3 - 10.4 MG/DL    Total Bilirubin 0.6 0.2 - 1.1 MG/DL    ALT 23 12 - 65 U/L    AST 29 15 - 37 U/L    Alk Phosphatase 93 50 - 130 U/L    Total Protein 6.6 6.3 - 8.2 g/dL    Albumin 2.4 (L) 3.2 - 4.6 g/dL    Globulin 4.2 (H) 2.3 - 3.5 g/dL    Albumin/Globulin Ratio 0.6 (L) 1.2 - 3.5     CBC with Auto Differential    Collection Time: 10/06/22  3:13 AM   Result Value Ref Range    WBC 14.4 (H) 4.3 - 11.1 K/uL    RBC 3.89 (L) 4.05 - 5.2 M/uL    Hemoglobin 11.6 (L) 11.7 - 15.4 g/dL    Hematocrit 35.0 (L) 35.8 - 46.3 %    MCV 90.0 79.6 - 97.8 FL    MCH 29.8 26.1 - 32.9 PG    MCHC 33.1 31.4 - 35.0 g/dL    RDW 13.7 11.9 - 14.6 %    Platelets 519 (L) 053 - 450 K/uL    MPV 11.5 9.4 - 12.3 FL    nRBC 0.00 0.0 - 0.2 K/uL    Differential Type AUTOMATED      Seg Neutrophils 79 (H) 43 - 78 %    Lymphocytes 9 (L) 13 - 44 %    Monocytes 11 4.0 - 12.0 %    Eosinophils % 0 (L) 0.5 - 7.8 %    Basophils 0 0.0 - 2.0 %    Immature Granulocytes 1 0.0 - 5.0 %    Segs Absolute 11.4 (H) 1.7 - 8.2 K/UL    Absolute Lymph # 1.2 0.5 - 4.6 K/UL    Absolute Mono # 1.6 (H) 0.1 - 1.3 K/UL    Absolute Eos # 0.0 0.0 - 0.8 K/UL    Basophils Absolute 0.1 0.0 - 0.2 K/UL    Absolute Immature Granulocyte 0.1 0.0 - 0.5 K/UL   Magnesium    Collection Time: 10/06/22  3:13 AM   Result Value Ref Range    Magnesium 2.1 1.8 - 2.4 mg/dL   Culture, Blood 1    Collection Time: 10/06/22  4:40 AM    Specimen: Blood   Result Value Ref Range    Special Requests LEFT  HAND        Culture NO GROWTH 1 DAY     Culture, Blood 1    Collection Time: 10/06/22  4:46 AM    Specimen: Blood   Result Value Ref Range    Special Requests LEFT  FOREARM        Gram stain GRAM POSITIVE COCCI      Gram stain AEROBIC BOTTLE POSITIVE      Gram stain        RESULTS VERIFIED, PHONED TO AND READ BACK BY Hany Jones 10.7.22 @ 1765 TO AK    Culture CULTURE IN PROGRESS,FURTHER UPDATES TO FOLLOW     POCT Glucose    Collection Time: 10/06/22  6:13 AM   Result Value Ref Range    POC Glucose 190 (H) 65 - 100 mg/dL    Performed by: Jerardo    Vancomycin Level, Random    Collection Time: 10/06/22  8:02 AM   Result Value Ref Range    Vancomycin Rm 17.1 UG/ML   Transthoracic echocardiogram (TTE) complete with contrast, bubble, strain, and 3D PRN    Collection Time: 10/06/22  9:02 AM   Result Value Ref Range    LV EDV A2C 116 mL    LV EDV A4C 139 mL    LV ESV A2C 42 mL    LV ESV A4C 52 mL    IVSd 1.5 (A) 0.6 - 0.9 cm    LVIDd 4.7 3.9 - 5.3 cm    LVIDs 3.4 cm    LVOT Diameter 2.0 cm    LVOT Mean Gradient 2 mmHg    LVOT VTI 20.5 cm    LVOT Peak Velocity 1.0 m/s    LVOT Peak Gradient 4 mmHg    LVPWd 1.0 (A) 0.6 - 0.9 cm    LV E' Lateral Velocity 15 cm/s    LV E' Septal Velocity 10 cm/s    LV Ejection Fraction A2C 64 %    LV Ejection Fraction A4C 62 %    EF BP 63 55 - 100 %    LVOT Area 3.1 cm2    LVOT SV 64.4 ml    LA Minor Axis 6.2 cm    LA Major [place] 5.1 cm    LA Area 2C 23.2 cm2    LA Area 4C 15.4 cm2    LA Volume BP 56 (A) 22 - 52 mL    LA Diameter 3.7 cm    AV Mean Velocity 1.2 m/s    AV Mean Velocity 1.2 m/s    AV Mean Gradient 7 mmHg    AV Mean Gradient 7 mmHg    AV VTI 36.4 cm    AV Peak Velocity 1.9 m/s    AV Peak Gradient 14 mmHg AV Area by VTI 1.8 cm2    AV Area by Peak Velocity 1.6 cm2    Aortic Root 3.2 cm    Ascending Aorta 3.6 cm    IVC Expiration 1.9 cm    MV E Wave Deceleration Time 143.0 ms    MV A Velocity 0.79 m/s    MV E Velocity 1.38 m/s    MV Mean Velocity 0.7 m/s    MV Mean Gradient 2 mmHg    MV Mean Gradient 2 mmHg    MV VTI 27.6 cm    MV Max Velocity 1.2 m/s    MV Peak Gradient 6 mmHg    MV Area by VTI 2.3 cm2    PV .0 ms    PV Max Velocity 1.2 m/s    PV Peak Gradient 5 mmHg    RVIDd 2.9 cm    RV Basal Dimension 3.7 cm    TAPSE 2.2 1.7 cm    Body Surface Area 2.1 m2    Fractional Shortening 2D 28 28 - 44 %    LV ESV Index A4C 25 mL/m2    LV EDV Index A4C 68 mL/m2    LV ESV Index A2C 21 mL/m2    LV EDV Index A2C 57 mL/m2    LVIDd Index 2.30 cm/m2    LVIDs Index 1.67 cm/m2    LV RWT Ratio 0.43     LV Mass 2D 224.8 (A) 67 - 162 g    LV Mass 2D Index 110.2 (A) 43 - 95 g/m2    MV E/A 1.75     E/E' Ratio (Averaged) 11.50     E/E' Lateral 9.20     E/E' Septal 13.80     LA Volume Index BP 27 16 - 34 ml/m2    LVOT Stroke Volume Index 31.6 mL/m2    LA Size Index 1.81 cm/m2    LA/AO Root Ratio 1.16     Ao Root Index 1.57 cm/m2    Ascending Aorta Index 1.76 cm/m2    AV Velocity Ratio 0.53     LVOT:AV VTI Index 0.56     KP/BSA VTI 0.9 cm2/m2    KP/BSA Peak Velocity 0.8 cm2/m2    MV:LVOT VTI Index 1.35     Est. RA Pressure 3 mmHg   POCT Glucose    Collection Time: 10/06/22 11:24 AM   Result Value Ref Range    POC Glucose 247 (H) 65 - 100 mg/dL    Performed by: [employer]    POCT Glucose    Collection Time: 10/06/22  4:15 PM   Result Value Ref Range    POC Glucose 221 (H) 65 - 100 mg/dL    Performed by: [employer]    POCT Glucose    Collection Time: 10/06/22  8:45 PM   Result Value Ref Range    POC Glucose 214 (H) 65 - 100 mg/dL    Performed by: Mei    POCT Glucose    Collection Time: 10/07/22  6:15 AM   Result Value Ref Range    POC Glucose 172 (H) 65 - 100 mg/dL    Performed by: Mei    CBC Collection Time: 10/07/22  8:08 AM   Result Value Ref Range    WBC 14.0 (H) 4.3 - 11.1 K/uL    RBC 4.01 (L) 4.05 - 5.2 M/uL    Hemoglobin 12.1 11.7 - 15.4 g/dL    Hematocrit 37.1 35.8 - 46.3 %    MCV 92.5 79.6 - 97.8 FL    MCH 30.2 26.1 - 32.9 PG    MCHC 32.6 31.4 - 35.0 g/dL    RDW 13.9 11.9 - 14.6 %    Platelets 946 (L) 198 - 450 K/uL    MPV 11.6 9.4 - 12.3 FL    nRBC 0.00 0.0 - 0.2 K/uL   Basic Metabolic Panel w/ Reflex to MG    Collection Time: 10/07/22  8:08 AM   Result Value Ref Range    Sodium 135 (L) 136 - 145 mmol/L    Potassium 3.7 3.5 - 5.1 mmol/L    Chloride 100 (L) 101 - 110 mmol/L    CO2 26 21 - 32 mmol/L    Anion Gap 9 4 - 13 mmol/L    Glucose 200 (H) 65 - 100 mg/dL    BUN 21 8 - 23 MG/DL    Creatinine 0.68 0.6 - 1.0 MG/DL    Est, Glom Filt Rate >60 >60 ml/min/1.73m2    Calcium 8.6 8.3 - 10.4 MG/DL   POCT Glucose    Collection Time: 10/07/22 11:40 AM   Result Value Ref Range    POC Glucose 248 (H) 65 - 100 mg/dL    Performed by: Zack        Other Studies:  CT ABDOMEN PELVIS W IV CONTRAST Additional Contrast? None    Result Date: 10/5/2022  EXAM: CT ABDOMEN PELVIS W IV CONTRAST HISTORY: sepsis. TECHNIQUE: Axial images of the abdomen and pelvis were performed following the administration of intravenous contrast. Images were obtained in the axial plane and coronal reformatted images were created and reviewed. Dose reduction technique used: Automated exposure control/Adjustment of the mA and/or kV according to patient size/Use of iterative reconstruction technique. 100 mL of [id number]-370 were utilized. COMPARISON: CT chest dated 9/12/2009. FINDINGS: The visualized lung bases show mild dependent atelectasis. The visualized mediastinum appears unremarkable. The liver, spleen, adrenal glands, and kidneys are within normal limits. The bladder appears grossly normal. The pancreas appears atrophic. The gallbladder is dilated. It measures up to 6.1 cm transversely.  It contains at least one radiopaque stone. Distal esophagus appears unremarkable. The patient is status post gastric bypass. Small and large bowel show no evidence of obstruction. A structure felt to represent the appendix is seen in the right lower quadrant and appears grossly normal. No evidence of free fluid or free air. No pathologic adenopathy. No abdominal aortic aneurysm. Osseous structures show no evidence of acute fracture or suspicious lesion. There is a small fat-containing umbilical hernia. Spinal stimulator leads are in place at the level of the thoracic spine. There is a small fluid collection surrounding the subcutaneous portion. The gallbladder is dilated. It measures up to 6.1 cm transversely. It contains at least one radiopaque stone. There is no appreciable pericholecystic fluid or obvious wall thickening. Spinal stimulator leads are in place at the level of the thoracic spine. There is a small fluid collection surrounding the subcutaneous portion. The patient is status post gastric bypass. XR CHEST PORTABLE    Result Date: 10/4/2022  CHEST X-RAY, single portable view  10/4/2022 History: Fall. Technique: Single frontal view of the chest. Comparison: Chest x-ray 12/14/2010 Findings: Today's study is limited given that the patient is imaged obliqued to the right. The cardiac silhouette is not clearly enlarged given AP technique of this exam.  There is an asymmetric appearance of the left hilum which may be related to the patient's positioning. Similar prominence has been seen such as on a prior comparison dated 12/11/2010 therefore this is not highly suspicious. The lungs are expanded without evidence for pneumothorax. No consolidative airspace process or pleural effusion is seen. 1.  Limited study without significant acute changes evident.        Current Meds:  Current Facility-Administered Medications   Medication Dose Route Frequency    ampicillin 2000 mg ivpb mini bag  2,000 mg IntraVENous 6 times per day spironolactone (ALDACTONE) tablet 25 mg  25 mg Oral Daily    gabapentin (NEURONTIN) capsule 300 mg  300 mg Oral TID    DULoxetine (CYMBALTA) extended release capsule 60 mg  60 mg Oral Nightly    carvedilol (COREG) tablet 6.25 mg  6.25 mg Oral BID WC    levothyroxine (SYNTHROID) tablet 125 mcg  125 mcg Oral Daily    pantoprazole (PROTONIX) tablet 40 mg  40 mg Oral QAM AC    ezetimibe (ZETIA) tablet 10 mg  10 mg Oral Nightly    HYDROmorphone HCl PF (DILAUDID) injection 1 mg  1 mg IntraVENous Q4H PRN    sodium chloride flush 0.9 % injection 5-40 mL  5-40 mL IntraVENous 2 times per day    sodium chloride flush 0.9 % injection 5-40 mL  5-40 mL IntraVENous PRN    0.9 % sodium chloride infusion   IntraVENous PRN    enoxaparin (LOVENOX) injection 40 mg  40 mg SubCUTAneous Daily    ondansetron (ZOFRAN-ODT) disintegrating tablet 4 mg  4 mg Oral Q8H PRN    Or    ondansetron (ZOFRAN) injection 4 mg  4 mg IntraVENous Q6H PRN    polyethylene glycol (GLYCOLAX) packet 17 g  17 g Oral Daily PRN    acetaminophen (TYLENOL) tablet 650 mg  650 mg Oral Q6H PRN    Or    acetaminophen (TYLENOL) suppository 650 mg  650 mg Rectal Q6H PRN    insulin lispro (HUMALOG) injection vial 0-8 Units  0-8 Units SubCUTAneous TID WC    insulin lispro (HUMALOG) injection vial 0-4 Units  0-4 Units SubCUTAneous Nightly    glucose chewable tablet 16 g  4 tablet Oral PRN    dextrose bolus 10% 125 mL  125 mL IntraVENous PRN    Or    dextrose bolus 10% 250 mL  250 mL IntraVENous PRN    glucagon (rDNA) injection 1 mg  1 mg SubCUTAneous PRN    dextrose 10 % infusion   IntraVENous Continuous PRN    aspirin chewable tablet 81 mg  81 mg Oral Nightly    traZODone (DESYREL) tablet 50 mg  50 mg Oral Nightly    topiramate (TOPAMAX) tablet 100 mg  100 mg Oral BID    oxyCODONE-acetaminophen (PERCOCET)  MG per tablet 1 tablet  1 tablet Oral Q6H PRN    tiZANidine (ZANAFLEX) tablet 4 mg  4 mg Oral Q12H PRN       Signed:  Aaliyah Mcgovern DO  10/5/22

## 2022-10-07 NOTE — PROGRESS NOTES
very resistant to rolling L or R and hollered out \"no\" multiple times. Patient reports she has a spinal stimulator (turned on low) and head, neck, and back pain. She was too agitated after getting her cleaned up to attempt any further activity. Recommending STR at d/c as not sure if or how soon she'll get back to a reported baseline of independence with mobility. 10/7 supine upon arrival.   Work on rolling from L><R  x 5 to get clean up, new brief and bed changed all with Total A, therapist tired to encourage to help by getting hold of the hand handle, but she was in to much pain. Then performs B UE/LE with PROM, again therapist tired to encourage pt to help, but to much pain. Pt needs extra time to work through activities. Remain in the bed with needs in reach and instructed to call for assist, before getting up. SUBJECTIVE:   Ms. Jody Domínguez states, \"I am tired right now\".     Social/Functional Lives With: Spouse  Type of Home: House  Home Layout: Multi-level, Bed/Bath upstairs  Home Access: Stairs to enter without rails  Entrance Stairs - Number of Steps: 3  Receives Help From: Family  ADL Assistance: Independent  Homemaking Assistance: Needs assistance  Homemaking Responsibilities: No  Ambulation Assistance: Independent  Active : No (has a license but doesn't drive)  Occupation: Retired  OBJECTIVE:     PAIN: VITALS / O2: PRECAUTION / Erendira Lightning / Caleen Collins:   Pre Treatment: 5/10         Post Treatment: about the same Vitals        Oxygen    IV    RESTRICTIONS/PRECAUTIONS:  Restrictions/Precautions  Restrictions/Precautions: Fall Risk  Restrictions/Precautions: Fall Risk     MOBILITY: I Mod I S SBA CGA Min Mod Max Total  NT x2 Comments:   Bed Mobility    Rolling [] [] [] [] [] [] [] [] [x] [] [x] Getting clean up and rolling from L><R with Total A    Supine to Sit [] [] [] [] [] [] [] [] [] [x] []    Scooting [] [] [] [] [] [] [] [] [] [x] []    Sit to Supine [] [] [] [] [] [] [] [] [] [x] []    Transfers Sit to Stand [] [] [] [] [] [] [] [] [] [x] []    Bed to Chair [] [] [] [] [] [] [] [] [] [] []    Stand to Sit [] [] [] [] [] [] [] [] [] [x] []     [] [] [] [] [] [] [] [] [] [x] []    I=Independent, Mod I=Modified Independent, S=Supervision, SBA=Standby Assistance, CGA=Contact Guard Assistance,   Min=Minimal Assistance, Mod=Moderate Assistance, Max=Maximal Assistance, Total=Total Assistance, NT=Not Tested    BALANCE: Good Fair+ Fair Fair- Poor NT Comments   Sitting Static [] [] [] [] [] [x]    Sitting Dynamic [] [] [] [] [] [x]              Standing Static [] [] [] [] [] [x]    Standing Dynamic [] [] [] [] [] [x]      GAIT: I Mod I S SBA CGA Min Mod Max Total  NT x2 Comments:   Level of Assistance [] [] [] [] [] [] [] [] [] [x] []    Distance   feet    DME N/A    Gait Quality N/A    Weightbearing Status      Stairs      I=Independent, Mod I=Modified Independent, S=Supervision, SBA=Standby Assistance, CGA=Contact Guard Assistance,   Min=Minimal Assistance, Mod=Moderate Assistance, Max=Maximal Assistance, Total=Total Assistance, NT=Not Tested    PLAN:   FREQUENCY AND DURATION: Daily for duration of hospital stay or until stated goals are met, whichever comes first.    TREATMENT:   TREATMENT:   Therapeutic Activity (40 Minutes): Therapeutic activity included Rolling to improve functional Activity tolerance, Mobility, and ROM.     TREATMENT GRID:  PROM for B UE/LE Date:  10/7  Date:   Date:     Activity/Exercise Parameters Parameters Parameters   Ankle pumps 12     Quad set  12 A     Heel slides 12     Hip ab/ad 12     SAQ 12     Shoulder flex/ext 12     Shoulder ab/ad 12     Elbow flex/ext 12                 AFTER TREATMENT PRECAUTIONS: Alarm Activated, Bed, Bed/Chair Locked, Call light within reach, and Needs within reach    INTERDISCIPLINARY COLLABORATION:  RN/ PCT and PT/ PTA    EDUCATION: Education Given To: Patient  Education Provided: Role of Therapy  Education Method: Demonstration;Verbal    TIME IN/OUT:  Time In: 0945  Time Out: [place] Whatley  Minutes: [facility], [facility]

## 2022-10-07 NOTE — CONSULTS
Infectious Disease Consult      Today's Date: 10/7/2022   Admit Date: 10/4/2022    Impression:   Patient is a 69year old female with chronic MSK pain who has a spinal stimulator, also has a history of DM 2 , last HbA1c of 6.8  Admitted with myalgias and RLL redness. Blood culture is positive for GBS    #GBS bacteremia  - [facility]: 0 major 1 minor  - Etiology: Potentially lower extremity cellulitis. May have seeded spinal stimulator, although there may be a different organism or explanation for fluid collection around spinal stimulator  - Work up: CT with fluid around spinal stimulator, TTE and CT neck , CTA - negative  - Treatment: Dc vancomycin and ceftriaxone - start high dose amoxicillin  - Follow-up : White count slightly uptrended to 14, no fever and redness in LE has improved. Repeat cx so far negative  - Duration: 10 days      #Fluid around spinal stimulator  - Initial time of placement - 6/23/22  - Noted on initial CT abdomen 10/04, suggested possible fluid collection along subcutaneous aspect of spinal stimulator  - Follow-up CT done today should interval resolution of fluid collection   - Suspect superficial spinal stimulator site infection  - Can treat this with 10 days of antibiotics, especially given rapid resolution    Plan:   Amoxicillin 1gram Q8HRS  to complete 10 day course of antibiotics- EOT - 10/14/22  ID will sign off .  Thank you for this consult      Anti-infectives:   Vanc - 10/04 - present  Ctx 10/06 - present  Ptz- 10/04 - 10/6            Microbiology:  Reviewed    Studies:  Reviewed    Signed By: Fozia Alvarez MD     October 7, 2022

## 2022-10-08 LAB
ANION GAP SERPL CALC-SCNC: 8 MMOL/L (ref 4–13)
BACTERIA SPEC CULT: ABNORMAL
BACTERIA SPEC CULT: ABNORMAL
BUN SERPL-MCNC: 16 MG/DL (ref 8–23)
CALCIUM SERPL-MCNC: 8.3 MG/DL (ref 8.3–10.4)
CHLORIDE SERPL-SCNC: 99 MMOL/L (ref 101–110)
CO2 SERPL-SCNC: 25 MMOL/L (ref 21–32)
CREAT SERPL-MCNC: 0.72 MG/DL (ref 0.6–1)
ERYTHROCYTE [DISTWIDTH] IN BLOOD BY AUTOMATED COUNT: 13.6 % (ref 11.9–14.6)
GLUCOSE BLD STRIP.AUTO-MCNC: 233 MG/DL (ref 65–100)
GLUCOSE BLD STRIP.AUTO-MCNC: 246 MG/DL (ref 65–100)
GLUCOSE BLD STRIP.AUTO-MCNC: 270 MG/DL (ref 65–100)
GLUCOSE BLD STRIP.AUTO-MCNC: 296 MG/DL (ref 65–100)
GLUCOSE SERPL-MCNC: 292 MG/DL (ref 65–100)
GRAM STN SPEC: ABNORMAL
HCT VFR BLD AUTO: 33.9 % (ref 35.8–46.3)
HGB BLD-MCNC: 11.2 G/DL (ref 11.7–15.4)
MAGNESIUM SERPL-MCNC: 1.7 MG/DL (ref 1.8–2.4)
MCH RBC QN AUTO: 29.9 PG (ref 26.1–32.9)
MCHC RBC AUTO-ENTMCNC: 33 G/DL (ref 31.4–35)
MCV RBC AUTO: 90.4 FL (ref 79.6–97.8)
NRBC # BLD: 0 K/UL (ref 0–0.2)
PLATELET # BLD AUTO: 149 K/UL (ref 150–450)
PMV BLD AUTO: 10.8 FL (ref 9.4–12.3)
POTASSIUM SERPL-SCNC: 3.1 MMOL/L (ref 3.5–5.1)
RBC # BLD AUTO: 3.75 M/UL (ref 4.05–5.2)
SERVICE CMNT-IMP: ABNORMAL
SODIUM SERPL-SCNC: 132 MMOL/L (ref 136–145)
WBC # BLD AUTO: 11.8 K/UL (ref 4.3–11.1)

## 2022-10-08 PROCEDURE — 6370000000 HC RX 637 (ALT 250 FOR IP): Performed by: HOSPITALIST

## 2022-10-08 PROCEDURE — 6360000002 HC RX W HCPCS: Performed by: INTERNAL MEDICINE

## 2022-10-08 PROCEDURE — 6370000000 HC RX 637 (ALT 250 FOR IP): Performed by: FAMILY MEDICINE

## 2022-10-08 PROCEDURE — 87040 BLOOD CULTURE FOR BACTERIA: CPT

## 2022-10-08 PROCEDURE — 36415 COLL VENOUS BLD VENIPUNCTURE: CPT

## 2022-10-08 PROCEDURE — 6360000002 HC RX W HCPCS: Performed by: HOSPITALIST

## 2022-10-08 PROCEDURE — 82962 GLUCOSE BLOOD TEST: CPT

## 2022-10-08 PROCEDURE — 97530 THERAPEUTIC ACTIVITIES: CPT

## 2022-10-08 PROCEDURE — 6370000000 HC RX 637 (ALT 250 FOR IP): Performed by: INTERNAL MEDICINE

## 2022-10-08 PROCEDURE — 85027 COMPLETE CBC AUTOMATED: CPT

## 2022-10-08 PROCEDURE — 1100000000 HC RM PRIVATE

## 2022-10-08 PROCEDURE — 83735 ASSAY OF MAGNESIUM: CPT

## 2022-10-08 PROCEDURE — 80048 BASIC METABOLIC PNL TOTAL CA: CPT

## 2022-10-08 PROCEDURE — 2580000003 HC RX 258: Performed by: INTERNAL MEDICINE

## 2022-10-08 PROCEDURE — 2580000003 HC RX 258: Performed by: HOSPITALIST

## 2022-10-08 RX ORDER — OXYCODONE AND ACETAMINOPHEN 10; 325 MG/1; MG/1
2 TABLET ORAL EVERY 4 HOURS PRN
Status: DISCONTINUED | OUTPATIENT
Start: 2022-10-08 | End: 2022-10-11 | Stop reason: HOSPADM

## 2022-10-08 RX ORDER — POTASSIUM CHLORIDE 20 MEQ/1
40 TABLET, EXTENDED RELEASE ORAL PRN
Status: DISCONTINUED | OUTPATIENT
Start: 2022-10-08 | End: 2022-10-11 | Stop reason: HOSPADM

## 2022-10-08 RX ORDER — MAGNESIUM SULFATE IN WATER 40 MG/ML
2000 INJECTION, SOLUTION INTRAVENOUS ONCE
Status: COMPLETED | OUTPATIENT
Start: 2022-10-08 | End: 2022-10-08

## 2022-10-08 RX ORDER — POTASSIUM CHLORIDE 7.45 MG/ML
10 INJECTION INTRAVENOUS PRN
Status: DISCONTINUED | OUTPATIENT
Start: 2022-10-08 | End: 2022-10-11 | Stop reason: HOSPADM

## 2022-10-08 RX ORDER — OXYCODONE AND ACETAMINOPHEN 10; 325 MG/1; MG/1
1 TABLET ORAL EVERY 4 HOURS PRN
Status: DISCONTINUED | OUTPATIENT
Start: 2022-10-08 | End: 2022-10-11 | Stop reason: HOSPADM

## 2022-10-08 RX ADMIN — INSULIN LISPRO 4 UNITS: 100 INJECTION, SOLUTION INTRAVENOUS; SUBCUTANEOUS at 09:19

## 2022-10-08 RX ADMIN — AMPICILLIN SODIUM 2000 MG: 2 INJECTION, POWDER, FOR SOLUTION INTRAMUSCULAR; INTRAVENOUS at 04:05

## 2022-10-08 RX ADMIN — GABAPENTIN 300 MG: 300 CAPSULE ORAL at 17:02

## 2022-10-08 RX ADMIN — SODIUM CHLORIDE: 9 INJECTION, SOLUTION INTRAVENOUS at 12:42

## 2022-10-08 RX ADMIN — LEVOTHYROXINE SODIUM 125 MCG: 0.12 TABLET ORAL at 06:12

## 2022-10-08 RX ADMIN — PANTOPRAZOLE SODIUM 40 MG: 40 TABLET, DELAYED RELEASE ORAL at 06:12

## 2022-10-08 RX ADMIN — OXYCODONE AND ACETAMINOPHEN 2 TABLET: 325; 10 TABLET ORAL at 22:15

## 2022-10-08 RX ADMIN — TOPIRAMATE 100 MG: 100 TABLET, FILM COATED ORAL at 09:12

## 2022-10-08 RX ADMIN — GABAPENTIN 300 MG: 300 CAPSULE ORAL at 21:11

## 2022-10-08 RX ADMIN — TOPIRAMATE 100 MG: 100 TABLET, FILM COATED ORAL at 21:11

## 2022-10-08 RX ADMIN — AMPICILLIN SODIUM 2000 MG: 2 INJECTION, POWDER, FOR SOLUTION INTRAMUSCULAR; INTRAVENOUS at 23:26

## 2022-10-08 RX ADMIN — DULOXETINE HYDROCHLORIDE 60 MG: 60 CAPSULE, DELAYED RELEASE ORAL at 21:10

## 2022-10-08 RX ADMIN — AMPICILLIN SODIUM 2000 MG: 2 INJECTION, POWDER, FOR SOLUTION INTRAMUSCULAR; INTRAVENOUS at 09:13

## 2022-10-08 RX ADMIN — OXYCODONE AND ACETAMINOPHEN 1 TABLET: 10; 325 TABLET ORAL at 03:27

## 2022-10-08 RX ADMIN — HYDROMORPHONE HYDROCHLORIDE 1 MG: 1 INJECTION, SOLUTION INTRAMUSCULAR; INTRAVENOUS; SUBCUTANEOUS at 17:57

## 2022-10-08 RX ADMIN — ASPIRIN 81 MG: 81 TABLET, CHEWABLE ORAL at 21:10

## 2022-10-08 RX ADMIN — OXYCODONE AND ACETAMINOPHEN 1 TABLET: 10; 325 TABLET ORAL at 09:30

## 2022-10-08 RX ADMIN — CARVEDILOL 6.25 MG: 6.25 TABLET, FILM COATED ORAL at 17:02

## 2022-10-08 RX ADMIN — AMPICILLIN SODIUM 2000 MG: 2 INJECTION, POWDER, FOR SOLUTION INTRAMUSCULAR; INTRAVENOUS at 21:19

## 2022-10-08 RX ADMIN — CARVEDILOL 6.25 MG: 6.25 TABLET, FILM COATED ORAL at 09:12

## 2022-10-08 RX ADMIN — HYDROMORPHONE HYDROCHLORIDE 1 MG: 1 INJECTION, SOLUTION INTRAMUSCULAR; INTRAVENOUS; SUBCUTANEOUS at 10:37

## 2022-10-08 RX ADMIN — INSULIN LISPRO 2 UNITS: 100 INJECTION, SOLUTION INTRAVENOUS; SUBCUTANEOUS at 17:12

## 2022-10-08 RX ADMIN — TRAZODONE HYDROCHLORIDE 50 MG: 50 TABLET ORAL at 21:11

## 2022-10-08 RX ADMIN — ENOXAPARIN SODIUM 40 MG: 100 INJECTION SUBCUTANEOUS at 09:13

## 2022-10-08 RX ADMIN — AMPICILLIN SODIUM 2000 MG: 2 INJECTION, POWDER, FOR SOLUTION INTRAMUSCULAR; INTRAVENOUS at 00:48

## 2022-10-08 RX ADMIN — POTASSIUM CHLORIDE 40 MEQ: 1500 TABLET, EXTENDED RELEASE ORAL at 06:42

## 2022-10-08 RX ADMIN — HYDROMORPHONE HYDROCHLORIDE 1 MG: 1 INJECTION, SOLUTION INTRAMUSCULAR; INTRAVENOUS; SUBCUTANEOUS at 06:13

## 2022-10-08 RX ADMIN — AMPICILLIN SODIUM 2000 MG: 2 INJECTION, POWDER, FOR SOLUTION INTRAMUSCULAR; INTRAVENOUS at 12:07

## 2022-10-08 RX ADMIN — SPIRONOLACTONE 25 MG: 25 TABLET ORAL at 09:11

## 2022-10-08 RX ADMIN — INSULIN LISPRO 2 UNITS: 100 INJECTION, SOLUTION INTRAVENOUS; SUBCUTANEOUS at 12:07

## 2022-10-08 RX ADMIN — EZETIMIBE 10 MG: 10 TABLET ORAL at 21:10

## 2022-10-08 RX ADMIN — GABAPENTIN 300 MG: 300 CAPSULE ORAL at 09:12

## 2022-10-08 RX ADMIN — MAGNESIUM SULFATE HEPTAHYDRATE 2000 MG: 40 INJECTION, SOLUTION INTRAVENOUS at 12:42

## 2022-10-08 RX ADMIN — TIZANIDINE 4 MG: 2 TABLET ORAL at 21:10

## 2022-10-08 RX ADMIN — AMPICILLIN SODIUM 2000 MG: 2 INJECTION, POWDER, FOR SOLUTION INTRAMUSCULAR; INTRAVENOUS at 16:04

## 2022-10-08 ASSESSMENT — PAIN SCALES - GENERAL
PAINLEVEL_OUTOF10: 3
PAINLEVEL_OUTOF10: 8
PAINLEVEL_OUTOF10: 8
PAINLEVEL_OUTOF10: 7
PAINLEVEL_OUTOF10: 7
PAINLEVEL_OUTOF10: 8
PAINLEVEL_OUTOF10: 8

## 2022-10-08 ASSESSMENT — PAIN DESCRIPTION - LOCATION
LOCATION: NECK;BACK
LOCATION: GENERALIZED
LOCATION: BACK

## 2022-10-08 ASSESSMENT — PAIN DESCRIPTION - ORIENTATION
ORIENTATION: MID
ORIENTATION: MID

## 2022-10-08 ASSESSMENT — PAIN - FUNCTIONAL ASSESSMENT: PAIN_FUNCTIONAL_ASSESSMENT: PREVENTS OR INTERFERES SOME ACTIVE ACTIVITIES AND ADLS

## 2022-10-08 ASSESSMENT — PAIN DESCRIPTION - ONSET: ONSET: GRADUAL

## 2022-10-08 ASSESSMENT — PAIN DESCRIPTION - DESCRIPTORS
DESCRIPTORS: ACHING

## 2022-10-08 ASSESSMENT — PAIN DESCRIPTION - FREQUENCY: FREQUENCY: CONTINUOUS

## 2022-10-08 ASSESSMENT — PAIN DESCRIPTION - PAIN TYPE: TYPE: ACUTE PAIN

## 2022-10-08 NOTE — PROGRESS NOTES
ACUTE PHYSICAL THERAPY GOALS:   (Developed with and agreed upon by patient and/or caregiver.)  Goals set based on prior level of function. STG:  (1.)Ms. Jammie Odonnell will move from supine to sit and sit to supine  with MINIMAL ASSIST within 5 treatment day(s). (2.)Ms. Jammie Odonnell will transfer from bed to chair and chair to bed with MINIMAL ASSIST using the least restrictive device within 5 treatment day(s). (3.)Ms. Jammie Odonnell will ambulate with MINIMAL ASSIST for 10 feet with the least restrictive device within 5 treatment day(s). LTG:  (1.)Ms. Jammie Odonnell will move from supine to sit and sit to supine  in bed with STAND BY ASSIST within 10 treatment day(s). (2.)Ms. Jammie Odonnell will transfer from bed to chair and chair to bed with STAND BY ASSIST using the least restrictive device within 10 treatment day(s). (3.)Ms. Jammie Odonnell will ambulate with STAND BY ASSIST for 50 feet with the least restrictive device within 10 treatment day(s). PHYSICAL THERAPY: Daily Note AM   (Link to Caseload Tracking: PT Visit Days : 4  Time In/Out PT Charge Capture  Rehab Caseload Tracker  Orders    Lyle Schneider is a 69 y.o. female   PRIMARY DIAGNOSIS: Bacteremia due to Gram-positive bacteria  Cellulitis [L03.90]  JOSE (acute kidney injury) ([facility] [address] [N17.9]  Cellulitis of right lower extremity [L03.115]  Sepsis, due to unspecified organism, unspecified whether acute organ dysfunction present ([facility] [address] [A41.9]  Bacteremia due to Gram-negative bacteria [R78.81]       Inpatient: Payor: MEDICARE / Plan: MEDICARE PART A AND B / Product Type: *No Product type* /     ASSESSMENT:     REHAB RECOMMENDATIONS:   Recommendation to date pending progress:  Setting:  Short-term Rehab    Equipment:    To Be Determined     ASSESSMENT:  Ms. Jammie Odonnell with little tolerance for any activity. She remains easily agitated and confused. She needed lots of encouragement to work with therapy but then noted her brief was saturated.   Patient changed with maximum assist of 2 as she was very resistant to rolling L or R and hollered out \"no\" multiple times. Patient reports she has a spinal stimulator (turned on low) and head, neck, and back pain. She was too agitated after getting her cleaned up to attempt any further activity. Recommending STR at d/c as not sure if or how soon she'll get back to a reported baseline of independence with mobility. 10/8 - pt. Supine after being cleaned up by RN and Cna in the bed. She agreed to get up. She needed lots of assistance to sit up and initially was leaning posteriorly significantly and needing assistance. Eventually, she was able to sit unassisted. She stood with RW and lots of assistance and was able to take a couple of steps to the chair. All mobility takes extra time and assistance and she complained of pain throughout but she made significant mobility improvements this am.  Left in chair in no distress. SUBJECTIVE:   Ms. Mary Ellen Garza states, \"I need to have the medicine before I move\".     Social/Functional Lives With: Spouse  Type of Home: House  Home Layout: Multi-level, Bed/Bath upstairs  Home Access: Stairs to enter without rails  Entrance Stairs - Number of Steps: 3  Receives Help From: Family  ADL Assistance: Independent  Homemaking Assistance: Needs assistance  Homemaking Responsibilities: No  Ambulation Assistance: Independent  Active : No (has a license but doesn't drive)  Occupation: Retired  OBJECTIVE:     PAIN: VITALS / O2: PRECAUTION / Colten Harper / Harvey Jimenez:   Pre Treatment:          Post Treatment: complained of pain throughout Vitals        Oxygen    None    RESTRICTIONS/PRECAUTIONS:  Restrictions/Precautions  Restrictions/Precautions: Fall Risk  Restrictions/Precautions: Fall Risk     MOBILITY: I Mod I S SBA CGA Min Mod Max Total  NT x2 Comments:   Bed Mobility    Rolling [] [] [] [] [] [] [] [] [x] [] [] Getting clean up and rolling from L><R with Total A    Supine to Sit [] [] [] [] [] [] [] [x] [] [] [x]    Scooting [] [] [] [] [] [] [] [x] [] [] [x]    Sit to Supine [] [] [] [] [] [] [] [] [] [] []    Transfers    Sit to Stand [] [] [] [] [] [] [x] [] [] [] [x] Bed high   Bed to Chair [] [] [] [] [] [] [x] [] [] [] [x]    Stand to Sit [] [] [] [] [] [x] [] [] [] [] [x]     [] [] [] [] [] [] [] [] [] [] []    I=Independent, Mod I=Modified Independent, S=Supervision, SBA=Standby Assistance, CGA=Contact Guard Assistance,   Min=Minimal Assistance, Mod=Moderate Assistance, Max=Maximal Assistance, Total=Total Assistance, NT=Not Tested    BALANCE: Good Fair+ Fair Fair- Poor NT Comments   Sitting Static [] [] [x] [] [] []    Sitting Dynamic [] [] [x] [] [] []              Standing Static [] [] [x] [] [] []    Standing Dynamic [] [] [x] [] [] []      GAIT: I Mod I S SBA CGA Min Mod Max Total  NT x2 Comments:   Level of Assistance [] [] [] [] [] [x] [] [] [] [] [x]    Distance 3 feet    DME Rolling Walker    Gait Quality Decreased michelle , Decreased step clearance, Decreased step length, Decreased stance, Shuffling , Step-to, and Trunk sway increased    Weightbearing Status      Stairs      I=Independent, Mod I=Modified Independent, S=Supervision, SBA=Standby Assistance, CGA=Contact Guard Assistance,   Min=Minimal Assistance, Mod=Moderate Assistance, Max=Maximal Assistance, Total=Total Assistance, NT=Not Tested    PLAN:   FREQUENCY AND DURATION: Daily for duration of hospital stay or until stated goals are met, whichever comes first.    TREATMENT:   TREATMENT:   Therapeutic Activity (35 Minutes): Therapeutic activity included Rolling, Supine to Sit, Scooting, Transfer Training, Ambulation on level ground, Sitting balance , and Standing balance to improve functional Activity tolerance, Balance, Coordination, Mobility, Strength, and ROM.     TREATMENT GRID:  PROM for B UE/LE Date:  10/7  Date:   Date:     Activity/Exercise Parameters Parameters Parameters   Ankle pumps 12     Quad set  12 A     Heel slides 12     Hip ab/ad 12     SAQ 12     Shoulder flex/ext 12     Shoulder ab/ad 12     Elbow flex/ext 12                 AFTER TREATMENT PRECAUTIONS: Bed/Chair Locked, Call light within reach, Chair, Needs within reach, and RN notified    INTERDISCIPLINARY COLLABORATION:  RN/ PCT and PT/ PTA    EDUCATION:      TIME IN/OUT:  Time In: 1030  Time Out: [address]  Minutes: Isidro Fagan PT

## 2022-10-08 NOTE — PROGRESS NOTES
Hospitalist Progress Note   Admit Date:  10/4/2022  9:06 PM   Name:  Santana Cortez   Age:  69 y.o. Sex:  female  :  1955   MRN:  [id number]   Room:  [facility]    Presenting Complaint: Back pain and fatigue  Reason(s) for Admission: Cellulitis [L03.90]  JOSE (acute kidney injury) ([facility] [address] [N17.9]  Cellulitis of right lower extremity [L03.115]  Sepsis, due to unspecified organism, unspecified whether acute organ dysfunction present [facility]) [A41.9]  Bacteremia due to Gram-negative bacteria [place] [facility] Course & Interval History:   69year old female with chronic pain status post neurostimulator placement on 22, hypertension, diabetes type 2, fibromyalgia presented to emergency room on 10/5/22 with chief complaint of generalized body ache, generalized weakness, poor appetite for past few days duration with dalia falls. Patient reported low-grade fever at home, history of right lower extremity redness involving just above ankle for past few days duration. Further work-up in emergency room shows evidence of elevated white count, procalcitonin is significantly elevated at 15. Patient was initiated on broad-spectrum antibiotics, chest x-ray did not show any evidence of infiltrates, urinalysis does not show evidence of UTI. Blood cultures grew group B strep so she was started on Ampicillin by ID. Subjective/24hr Events (10/08/22): I saw her sitting in the chair. Complains of 9/10 back and neck pain. Denies CP/SOB. Denies abdominal pain. Denies N/V/D. Denies F/C. Assessment & Plan:     Principal Problem:    Bacteremia due to Gram-positive bacteria  - Blood cultures from 10/4 with GBS  - Unsure etiology  - Stopped Zosyn  - Stopped Vancomycin  - 10/8 Continue Ampicillin until blood cultures negative  - ? Cellulitis  - UA unremarkable  - Pain stimulator area not red or tender  - CXR clear  - Procalcitonin 15  - 10/6 Repeat blood cultures with GPC  - 10/8 Repeat blood cultures taken this AM  - Repeat blood cultures on 10/8  - ECHO normal  - 10/6 CT C/T spine w contrast with no fluid pockets  - 10/4 CTAP with fluid around spinal stimulator  - Appreciate ID's input and assistance    Active Problems:    Cellulitis of right lower extremity  - Unsure if true cellulitis  - Has well demarcated lines and patient/ states it's been there a while  - Had right second to wound - followed by podiatry as OP  - 10/8 Continue Ampicillin      HTN (hypertension)  - All BP meds held due to hypotension in ER  - BP now up  - Restart Coreg now  - Restart other BP meds as appropriate      Type 2 diabetes mellitus without complication (HCC)  - Stable  - Continue Humalog SSI      Chronic pain syndrome  - States that pain is a lot worse over past 4 weeks  - S/P spinal stimulator on 6/23/22  - Continue home Percocet 10  - Continue Dilaudid PRN      S/P insertion of spinal cord stimulator      Opioid dependence ([address]  - Be mindful of drowsiness and respiratory depression      Fibromyalgia      Generalized anxiety disorder      HEIDY (obstructive sleep apnea)      Class 1 Obesity    Diet:  ADULT DIET; Regular  DVT PPx: Lovenox  Code status: Full Code    Hospital Problems:  Principal Problem:    Bacteremia due to Gram-positive bacteria  Active Problems:    Cellulitis of right lower extremity    HTN (hypertension)    Paroxysmal atrial fibrillation (HCC)    Type 2 diabetes mellitus without complication (HCC)    Chronic pain syndrome    S/P insertion of spinal cord stimulator    Opioid dependence (HCC)    Fibromyalgia    Generalized anxiety disorder    HEIDY (obstructive sleep apnea)    Primary hypothyroidism    Obesity  Resolved Problems:    * No resolved hospital problems.  *      Discharge Plan:     Location: [facility]  Days: 2-3  PPD Ordered: 10/5/22      Objective:   Patient Vitals for the past 24 hrs:   Temp Pulse Resp BP SpO2   10/08/22 1037 -- -- 17 -- --   10/08/22 0930 -- -- 18 -- --   10/08/22 0711 98.5 °F (36.9 °C) 70 18 (!) 110/49 90 %   10/08/22 0332 98.6 °F (37 °C) 67 18 138/87 91 %   10/07/22 2307 98.2 °F (36.8 °C) 63 18 130/64 90 %   10/07/22 1900 98.1 °F (36.7 °C) 64 18 (!) 144/76 92 %   10/07/22 1558 98 °F (36.7 °C) 68 16 (!) 150/66 100 %   10/07/22 1249 -- -- 16 -- --   10/07/22 1112 98.5 °F (36.9 °C) 73 18 (!) 158/74 94 %       Oxygen Therapy  SpO2: 90 %  Pulse Oximetry Type: Intermittent  Pulse via Oximetry: 94 beats per minute  Pulse Oximeter Device Mode: Intermittent  O2 Device: None (Room air)    Estimated body mass index is 32.11 kg/m² as calculated from the following:    Height as of this encounter: 5' 7\" (1.702 m). Weight as of this encounter: 205 lb (93 kg). No intake or output data in the 24 hours ending 10/08/22 1040        Physical Exam:   Blood pressure (!) 110/49, pulse 70, temperature 98.5 °F (36.9 °C), temperature source Axillary, resp. rate 17, height 5' 7\" (1.702 m), weight 205 lb (93 kg), SpO2 90 %. General:    Well nourished. In moderate distress, moaning  Head:  Normocephalic, atraumatic  Eyes:  Sclerae appear normal.  Pupils equally round. ENT:  Nares appear normal, no drainage. Moist oral mucosa  Neck:  No restricted ROM. Trachea midline   CV:   RRR. No m/r/g. No jugular venous distension. Lungs:   CTAB. No wheezing, rhonchi, or rales. Respirations even, unlabored  Abdomen: Bowel sounds present. Soft, nontender, nondistended. Extremities: No cyanosis or clubbing. No edema  Skin:     RLE with well demarcated area of erythema. Right second toe with wound. Warm and dry. Neuro:  CN II-XII grossly intact. Sensation intact. A&Ox3  Psych:  Normal mood and affect.       I have reviewed ordered lab tests and independently visualized imaging below:    Recent Labs:  Recent Results (from the past 48 hour(s))   POCT Glucose    Collection Time: 10/06/22 11:24 AM   Result Value Ref Range    POC Glucose 247 (H) 65 - 100 mg/dL    Performed by: Zack    POCT Glucose    Collection Time: 10/06/22  4:15 PM   Result Value Ref Range    POC Glucose 221 (H) 65 - 100 mg/dL    Performed by: Zack    POCT Glucose    Collection Time: 10/06/22  8:45 PM   Result Value Ref Range    POC Glucose 214 (H) 65 - 100 mg/dL    Performed by: Mei    POCT Glucose    Collection Time: 10/07/22  6:15 AM   Result Value Ref Range    POC Glucose 172 (H) 65 - 100 mg/dL    Performed by: Mei    CBC    Collection Time: 10/07/22  8:08 AM   Result Value Ref Range    WBC 14.0 (H) 4.3 - 11.1 K/uL    RBC 4.01 (L) 4.05 - 5.2 M/uL    Hemoglobin 12.1 11.7 - 15.4 g/dL    Hematocrit 37.1 35.8 - 46.3 %    MCV 92.5 79.6 - 97.8 FL    MCH 30.2 26.1 - 32.9 PG    MCHC 32.6 31.4 - 35.0 g/dL    RDW 13.9 11.9 - 14.6 %    Platelets 814 (L) 294 - 450 K/uL    MPV 11.6 9.4 - 12.3 FL    nRBC 0.00 0.0 - 0.2 K/uL   Basic Metabolic Panel w/ Reflex to MG    Collection Time: 10/07/22  8:08 AM   Result Value Ref Range    Sodium 135 (L) 136 - 145 mmol/L    Potassium 3.7 3.5 - 5.1 mmol/L    Chloride 100 (L) 101 - 110 mmol/L    CO2 26 21 - 32 mmol/L    Anion Gap 9 4 - 13 mmol/L    Glucose 200 (H) 65 - 100 mg/dL    BUN 21 8 - 23 MG/DL    Creatinine 0.68 0.6 - 1.0 MG/DL    Est, Glom Filt Rate >60 >60 ml/min/1.73m2    Calcium 8.6 8.3 - 10.4 MG/DL   POCT Glucose    Collection Time: 10/07/22 11:40 AM   Result Value Ref Range    POC Glucose 248 (H) 65 - 100 mg/dL    Performed by: Zack    POCT Glucose    Collection Time: 10/07/22  4:17 PM   Result Value Ref Range    POC Glucose 251 (H) 65 - 100 mg/dL    Performed by: Zack    PLEASE READ & DOCUMENT PPD TEST IN 48 HRS    Collection Time: 10/07/22  5:47 PM   Result Value Ref Range    PPD, (POC) Negative Negative    mm Induration 0 0 - 5 mm   POCT Glucose    Collection Time: 10/07/22  9:20 PM   Result Value Ref Range    POC Glucose 259 (H) 65 - 100 mg/dL    Performed by: Mei    CBC    Collection Time: 10/08/22  3:59 AM   Result Value Ref Range    WBC 11.8 (H) 4.3 - 11.1 K/uL    RBC 3.75 (L) 4.05 - 5.2 M/uL    Hemoglobin 11.2 (L) 11.7 - 15.4 g/dL    Hematocrit 33.9 (L) 35.8 - 46.3 %    MCV 90.4 79.6 - 97.8 FL    MCH 29.9 26.1 - 32.9 PG    MCHC 33.0 31.4 - 35.0 g/dL    RDW 13.6 11.9 - 14.6 %    Platelets 421 (L) 304 - 450 K/uL    MPV 10.8 9.4 - 12.3 FL    nRBC 0.00 0.0 - 0.2 K/uL   Basic Metabolic Panel w/ Reflex to MG    Collection Time: 10/08/22  3:59 AM   Result Value Ref Range    Sodium 132 (L) 136 - 145 mmol/L    Potassium 3.1 (L) 3.5 - 5.1 mmol/L    Chloride 99 (L) 101 - 110 mmol/L    CO2 25 21 - 32 mmol/L    Anion Gap 8 4 - 13 mmol/L    Glucose 292 (H) 65 - 100 mg/dL    BUN 16 8 - 23 MG/DL    Creatinine 0.72 0.6 - 1.0 MG/DL    Est, Glom Filt Rate >60 >60 ml/min/1.73m2    Calcium 8.3 8.3 - 10.4 MG/DL   Magnesium    Collection Time: 10/08/22  3:59 AM   Result Value Ref Range    Magnesium 1.7 (L) 1.8 - 2.4 mg/dL   POCT Glucose    Collection Time: 10/08/22  6:26 AM   Result Value Ref Range    POC Glucose 270 (H) 65 - 100 mg/dL    Performed by: Kasia Lang        Other Studies:  CT ABDOMEN PELVIS W IV CONTRAST Additional Contrast? None    Result Date: 10/5/2022  EXAM: CT ABDOMEN PELVIS W IV CONTRAST HISTORY: sepsis. TECHNIQUE: Axial images of the abdomen and pelvis were performed following the administration of intravenous contrast. Images were obtained in the axial plane and coronal reformatted images were created and reviewed. Dose reduction technique used: Automated exposure control/Adjustment of the mA and/or kV according to patient size/Use of iterative reconstruction technique. 100 mL of Isovue-370 were utilized. COMPARISON: CT chest dated 9/12/2009. FINDINGS: The visualized lung bases show mild dependent atelectasis. The visualized mediastinum appears unremarkable. The liver, spleen, adrenal glands, and kidneys are within normal limits. The bladder appears grossly normal. The pancreas appears atrophic. The gallbladder is dilated.  It measures up to 6.1 cm transversely. It contains at least one radiopaque stone. Distal esophagus appears unremarkable. The patient is status post gastric bypass. Small and large bowel show no evidence of obstruction. A structure felt to represent the appendix is seen in the right lower quadrant and appears grossly normal. No evidence of free fluid or free air. No pathologic adenopathy. No abdominal aortic aneurysm. Osseous structures show no evidence of acute fracture or suspicious lesion. There is a small fat-containing umbilical hernia. Spinal stimulator leads are in place at the level of the thoracic spine. There is a small fluid collection surrounding the subcutaneous portion. The gallbladder is dilated. It measures up to 6.1 cm transversely. It contains at least one radiopaque stone. There is no appreciable pericholecystic fluid or obvious wall thickening. Spinal stimulator leads are in place at the level of the thoracic spine. There is a small fluid collection surrounding the subcutaneous portion. The patient is status post gastric bypass. XR CHEST PORTABLE    Result Date: 10/4/2022  CHEST X-RAY, single portable view  10/4/2022 History: Fall. Technique: Single frontal view of the chest. Comparison: Chest x-ray 12/14/2010 Findings: Today's study is limited given that the patient is imaged obliqued to the right. The cardiac silhouette is not clearly enlarged given AP technique of this exam.  There is an asymmetric appearance of the left hilum which may be related to the patient's positioning. Similar prominence has been seen such as on a prior comparison dated 12/11/2010 therefore this is not highly suspicious. The lungs are expanded without evidence for pneumothorax. No consolidative airspace process or pleural effusion is seen. 1.  Limited study without significant acute changes evident.        Current Meds:  Current Facility-Administered Medications   Medication Dose Route Frequency    potassium chloride (KLOR-CON M) extended release tablet 40 mEq  40 mEq Oral PRN    Or    potassium bicarb-citric acid (EFFER-K) effervescent tablet 40 mEq  40 mEq Oral PRN    Or    potassium chloride 10 mEq/100 mL IVPB (Peripheral Line)  10 mEq IntraVENous PRN    magnesium sulfate 2000 mg in 50 mL IVPB premix  2,000 mg IntraVENous Once    ampicillin 2000 mg ivpb mini bag  2,000 mg IntraVENous 6 times per day    spironolactone (ALDACTONE) tablet 25 mg  25 mg Oral Daily    gabapentin (NEURONTIN) capsule 300 mg  300 mg Oral TID    DULoxetine (CYMBALTA) extended release capsule 60 mg  60 mg Oral Nightly    carvedilol (COREG) tablet 6.25 mg  6.25 mg Oral BID WC    levothyroxine (SYNTHROID) tablet 125 mcg  125 mcg Oral Daily    pantoprazole (PROTONIX) tablet 40 mg  40 mg Oral QAM AC    ezetimibe (ZETIA) tablet 10 mg  10 mg Oral Nightly    HYDROmorphone HCl PF (DILAUDID) injection 1 mg  1 mg IntraVENous Q4H PRN    sodium chloride flush 0.9 % injection 5-40 mL  5-40 mL IntraVENous 2 times per day    sodium chloride flush 0.9 % injection 5-40 mL  5-40 mL IntraVENous PRN    0.9 % sodium chloride infusion   IntraVENous PRN    enoxaparin (LOVENOX) injection 40 mg  40 mg SubCUTAneous Daily    ondansetron (ZOFRAN-ODT) disintegrating tablet 4 mg  4 mg Oral Q8H PRN    Or    ondansetron (ZOFRAN) injection 4 mg  4 mg IntraVENous Q6H PRN    polyethylene glycol (GLYCOLAX) packet 17 g  17 g Oral Daily PRN    acetaminophen (TYLENOL) tablet 650 mg  650 mg Oral Q6H PRN    Or    acetaminophen (TYLENOL) suppository 650 mg  650 mg Rectal Q6H PRN    insulin lispro (HUMALOG) injection vial 0-8 Units  0-8 Units SubCUTAneous TID WC    insulin lispro (HUMALOG) injection vial 0-4 Units  0-4 Units SubCUTAneous Nightly    glucose chewable tablet 16 g  4 tablet Oral PRN    dextrose bolus 10% 125 mL  125 mL IntraVENous PRN    Or    dextrose bolus 10% 250 mL  250 mL IntraVENous PRN    glucagon (rDNA) injection 1 mg  1 mg SubCUTAneous PRN    dextrose 10 % infusion   IntraVENous

## 2022-10-08 NOTE — PROGRESS NOTES
Reviewed notes for new spiritual concerns. Noted previous visit from     Patient was awake and alert    Sitting in chair enjoying lunch    No family present    No distress noted    Brief visit to encourage    She was thankful            Will follow as needed.

## 2022-10-08 NOTE — PROGRESS NOTES
ACUTE PHYSICAL THERAPY GOALS:   (Developed with and agreed upon by patient and/or caregiver.)  Goals set based on prior level of function. STG:  (1.)Ms. Frank Jim will move from supine to sit and sit to supine  with MINIMAL ASSIST within 5 treatment day(s). (2.)Ms. Frank Jim will transfer from bed to chair and chair to bed with MINIMAL ASSIST using the least restrictive device within 5 treatment day(s). (3.)Ms. Frank Jim will ambulate with MINIMAL ASSIST for 10 feet with the least restrictive device within 5 treatment day(s). LTG:  (1.)Ms. Frank Jmi will move from supine to sit and sit to supine  in bed with STAND BY ASSIST within 10 treatment day(s). (2.)Ms. Frank Jim will transfer from bed to chair and chair to bed with STAND BY ASSIST using the least restrictive device within 10 treatment day(s). (3.)Ms. Frank Jim will ambulate with STAND BY ASSIST for 50 feet with the least restrictive device within 10 treatment day(s). PHYSICAL THERAPY: Daily Note PM   (Link to Caseload Tracking: PT Visit Days : 4  Time In/Out PT Charge Capture  Rehab Caseload Tracker  Orders    Liss Chong is a 69 y.o. female   PRIMARY DIAGNOSIS: Bacteremia due to Gram-positive bacteria  Cellulitis [L03.90]  JOSE (acute kidney injury) ([facility] [address] [N17.9]  Cellulitis of right lower extremity [L03.115]  Sepsis, due to unspecified organism, unspecified whether acute organ dysfunction present ([facility] [address] [A41.9]  Bacteremia due to Gram-negative bacteria [R78.81]       Inpatient: Payor: MEDICARE / Plan: MEDICARE PART A AND B / Product Type: *No Product type* /     ASSESSMENT:     REHAB RECOMMENDATIONS:   Recommendation to date pending progress:  Setting:  Short-term Rehab    Equipment:    To Be Determined     ASSESSMENT:  Ms. Frank Jim with little tolerance for any activity. She remains easily agitated and confused. She needed lots of encouragement to work with therapy but then noted her brief was saturated.   Patient changed with maximum assist of 2 as she was very resistant to rolling L or R and hollered out \"no\" multiple times. Patient reports she has a spinal stimulator (turned on low) and head, neck, and back pain. She was too agitated after getting her cleaned up to attempt any further activity. Recommending STR at d/c as not sure if or how soon she'll get back to a reported baseline of independence with mobility. 10/8 - pt. Supine after being cleaned up by RN and Cna in the bed. She agreed to get up. She needed lots of assistance to sit up and initially was leaning posteriorly significantly and needing assistance. Eventually, she was able to sit unassisted. She stood with RW and lots of assistance and was able to take a couple of steps to the chair. All mobility takes extra time and assistance and she complained of pain throughout but she made significant mobility improvements this am.  Left in chair in no distress. PM - Pt. Still up in chair asleep. She wanted to get back in bed. She is wearing her neck collar like this am.  She sat in chair for about 21/2 hours. All activity takes extra time as patient responds slowly. She seems to be falling asleep at different times during the session. She has her eyes closed most of the time. She needed more assistance to get back to bed this afternoon but was able to take a few steps with RW back to bed with significant assistance and constant verbal and manual cues. She returned to supine and assisted RN with a brief change. She did some LE exercises in the bed. Pt. Left supine with no complaints. SUBJECTIVE:   Ms. Maite Irvin states, \"I guess only thing on is football\".     Social/Functional Lives With: Spouse  Type of Home: House  Home Layout: Multi-level, Bed/Bath upstairs  Home Access: Stairs to enter without rails  Entrance Stairs - Number of Steps: 3  Receives Help From: Family  ADL Assistance: Independent  Homemaking Assistance: Needs assistance  Homemaking Responsibilities: No  Ambulation Assistance: Independent  Active : No (has a license but doesn't drive)  Occupation: Retired  OBJECTIVE:     PAIN: VITALS / O2: PRECAUTION / [place] / DRAINS:   Pre Treatment:          Post Treatment: complained of pain throughout Vitals        Oxygen    None    RESTRICTIONS/PRECAUTIONS:  Restrictions/Precautions  Restrictions/Precautions: Fall Risk  Restrictions/Precautions: Fall Risk     MOBILITY: I Mod I S SBA CGA Min Mod Max Total  NT x2 Comments:   Bed Mobility    Rolling [] [] [] [] [] [] [] [x] [] [] [] Getting clean up and rolling from L><R    Supine to Sit [] [] [] [] [] [] [] [] [] [] []    Scooting [] [] [] [] [] [] [] [x] [] [] [x]    Sit to Supine [] [] [] [] [] [] [] [x] [] [] [x]    Transfers    Sit to Stand [] [] [] [] [] [] [] [x] [] [] [x]    Bed to Chair [] [] [] [] [] [] [x] [x] [] [] [x]    Stand to Sit [] [] [] [] [] [x] [] [] [] [] [x]     [] [] [] [] [] [] [] [] [] [] []    I=Independent, Mod I=Modified Independent, S=Supervision, SBA=Standby Assistance, CGA=Contact Guard Assistance,   Min=Minimal Assistance, Mod=Moderate Assistance, Max=Maximal Assistance, Total=Total Assistance, NT=Not Tested    BALANCE: Good Fair+ Fair Fair- Poor NT Comments   Sitting Static [] [] [x] [] [] []    Sitting Dynamic [] [] [x] [] [] []              Standing Static [] [] [x] [] [] [] With RW   Standing Dynamic [] [] [x] [] [] [] With RW     GAIT: I Mod I S SBA CGA Min Mod Max Total  NT x2 Comments:   Level of Assistance [] [] [] [] [] [] [x] [] [] [] [x]    Distance 3 feet    DME Rolling Walker    Gait Quality Decreased michelle , Decreased step clearance, Decreased step length, Decreased stance, Shuffling , Step-to, and Trunk sway increased    Weightbearing Status      Stairs      I=Independent, Mod I=Modified Independent, S=Supervision, SBA=Standby Assistance, CGA=Contact Guard Assistance,   Min=Minimal Assistance, Mod=Moderate Assistance, Max=Maximal Assistance, Total=Total Assistance, NT=Not Tested    PLAN: FREQUENCY AND DURATION: Daily for duration of hospital stay or until stated goals are met, whichever comes first.    TREATMENT:   TREATMENT:   Therapeutic Activity (40 Minutes): Therapeutic activity included Rolling, Sit to Supine, Scooting, Transfer Training, Ambulation on level ground, Sitting balance , Standing balance, and exercises to improve functional Activity tolerance, Balance, Coordination, Mobility, Strength, and ROM.     TREATMENT GRID:  PROM for B UE/LE Date:  10/7  Date:  10/8 Date:     Activity/Exercise Parameters Parameters Parameters   Ankle pumps 12 15    Quad set  12 A 10    Heel slides 12 10aa    Hip ab/ad 12 10aa    SAQ 12     Shoulder flex/ext 12     Shoulder ab/ad 12     Elbow flex/ext 12     SLR  10aa          AFTER TREATMENT PRECAUTIONS: Bed, Bed/Chair Locked, Call light within reach, Needs within reach, and RN notified    INTERDISCIPLINARY COLLABORATION:  RN/ PCT and PT/ PTA    EDUCATION:      TIME IN/OUT:  Time In: 1405  Time Out: [address]  Minutes: 3 Shane Lepe PT

## 2022-10-09 LAB
ANION GAP SERPL CALC-SCNC: 9 MMOL/L (ref 4–13)
BUN SERPL-MCNC: 13 MG/DL (ref 8–23)
CALCIUM SERPL-MCNC: 8.7 MG/DL (ref 8.3–10.4)
CHLORIDE SERPL-SCNC: 101 MMOL/L (ref 101–110)
CO2 SERPL-SCNC: 25 MMOL/L (ref 21–32)
CREAT SERPL-MCNC: 0.68 MG/DL (ref 0.6–1)
ERYTHROCYTE [DISTWIDTH] IN BLOOD BY AUTOMATED COUNT: 13.5 % (ref 11.9–14.6)
GLUCOSE BLD STRIP.AUTO-MCNC: 234 MG/DL (ref 65–100)
GLUCOSE BLD STRIP.AUTO-MCNC: 264 MG/DL (ref 65–100)
GLUCOSE BLD STRIP.AUTO-MCNC: 283 MG/DL (ref 65–100)
GLUCOSE SERPL-MCNC: 256 MG/DL (ref 65–100)
HCT VFR BLD AUTO: 34.4 % (ref 35.8–46.3)
HGB BLD-MCNC: 11.4 G/DL (ref 11.7–15.4)
MAGNESIUM SERPL-MCNC: 2 MG/DL (ref 1.8–2.4)
MCH RBC QN AUTO: 29.9 PG (ref 26.1–32.9)
MCHC RBC AUTO-ENTMCNC: 33.1 G/DL (ref 31.4–35)
MCV RBC AUTO: 90.3 FL (ref 79.6–97.8)
NRBC # BLD: 0 K/UL (ref 0–0.2)
PLATELET # BLD AUTO: 197 K/UL (ref 150–450)
PMV BLD AUTO: 10.2 FL (ref 9.4–12.3)
POTASSIUM SERPL-SCNC: 3.4 MMOL/L (ref 3.5–5.1)
RBC # BLD AUTO: 3.81 M/UL (ref 4.05–5.2)
SERVICE CMNT-IMP: ABNORMAL
SODIUM SERPL-SCNC: 135 MMOL/L (ref 136–145)
WBC # BLD AUTO: 13.4 K/UL (ref 4.3–11.1)

## 2022-10-09 PROCEDURE — 6360000002 HC RX W HCPCS: Performed by: INTERNAL MEDICINE

## 2022-10-09 PROCEDURE — 97530 THERAPEUTIC ACTIVITIES: CPT

## 2022-10-09 PROCEDURE — 6370000000 HC RX 637 (ALT 250 FOR IP): Performed by: INTERNAL MEDICINE

## 2022-10-09 PROCEDURE — 82962 GLUCOSE BLOOD TEST: CPT

## 2022-10-09 PROCEDURE — 1100000000 HC RM PRIVATE

## 2022-10-09 PROCEDURE — 6370000000 HC RX 637 (ALT 250 FOR IP): Performed by: FAMILY MEDICINE

## 2022-10-09 PROCEDURE — 83735 ASSAY OF MAGNESIUM: CPT

## 2022-10-09 PROCEDURE — 80048 BASIC METABOLIC PNL TOTAL CA: CPT

## 2022-10-09 PROCEDURE — 6360000002 HC RX W HCPCS: Performed by: HOSPITALIST

## 2022-10-09 PROCEDURE — 97112 NEUROMUSCULAR REEDUCATION: CPT

## 2022-10-09 PROCEDURE — 6370000000 HC RX 637 (ALT 250 FOR IP): Performed by: HOSPITALIST

## 2022-10-09 PROCEDURE — 85027 COMPLETE CBC AUTOMATED: CPT

## 2022-10-09 PROCEDURE — 2580000003 HC RX 258: Performed by: INTERNAL MEDICINE

## 2022-10-09 PROCEDURE — 36415 COLL VENOUS BLD VENIPUNCTURE: CPT

## 2022-10-09 RX ORDER — CELECOXIB 200 MG/1
200 CAPSULE ORAL 2 TIMES DAILY
Status: DISCONTINUED | OUTPATIENT
Start: 2022-10-09 | End: 2022-10-11 | Stop reason: HOSPADM

## 2022-10-09 RX ORDER — AMOXICILLIN 500 MG/1
1000 CAPSULE ORAL EVERY 8 HOURS SCHEDULED
Status: DISCONTINUED | OUTPATIENT
Start: 2022-10-09 | End: 2022-10-10

## 2022-10-09 RX ORDER — POTASSIUM CHLORIDE 20 MEQ/1
40 TABLET, EXTENDED RELEASE ORAL 2 TIMES DAILY WITH MEALS
Status: COMPLETED | OUTPATIENT
Start: 2022-10-09 | End: 2022-10-09

## 2022-10-09 RX ORDER — TIZANIDINE 2 MG/1
4 TABLET ORAL EVERY 6 HOURS PRN
Status: DISCONTINUED | OUTPATIENT
Start: 2022-10-09 | End: 2022-10-11 | Stop reason: HOSPADM

## 2022-10-09 RX ADMIN — CELECOXIB 200 MG: 200 CAPSULE ORAL at 21:21

## 2022-10-09 RX ADMIN — GABAPENTIN 300 MG: 300 CAPSULE ORAL at 15:20

## 2022-10-09 RX ADMIN — LEVOTHYROXINE SODIUM 125 MCG: 0.12 TABLET ORAL at 04:48

## 2022-10-09 RX ADMIN — POTASSIUM CHLORIDE 40 MEQ: 1500 TABLET, EXTENDED RELEASE ORAL at 09:48

## 2022-10-09 RX ADMIN — INSULIN LISPRO 2 UNITS: 100 INJECTION, SOLUTION INTRAVENOUS; SUBCUTANEOUS at 07:45

## 2022-10-09 RX ADMIN — CARVEDILOL 6.25 MG: 6.25 TABLET, FILM COATED ORAL at 16:47

## 2022-10-09 RX ADMIN — AMOXICILLIN 1000 MG: 500 CAPSULE ORAL at 14:55

## 2022-10-09 RX ADMIN — CARVEDILOL 6.25 MG: 6.25 TABLET, FILM COATED ORAL at 09:48

## 2022-10-09 RX ADMIN — TOPIRAMATE 100 MG: 100 TABLET, FILM COATED ORAL at 09:48

## 2022-10-09 RX ADMIN — OXYCODONE AND ACETAMINOPHEN 2 TABLET: 325; 10 TABLET ORAL at 15:21

## 2022-10-09 RX ADMIN — DULOXETINE HYDROCHLORIDE 60 MG: 60 CAPSULE, DELAYED RELEASE ORAL at 21:21

## 2022-10-09 RX ADMIN — SPIRONOLACTONE 25 MG: 25 TABLET ORAL at 09:48

## 2022-10-09 RX ADMIN — OXYCODONE AND ACETAMINOPHEN 2 TABLET: 325; 10 TABLET ORAL at 10:29

## 2022-10-09 RX ADMIN — AMPICILLIN SODIUM 2000 MG: 2 INJECTION, POWDER, FOR SOLUTION INTRAMUSCULAR; INTRAVENOUS at 08:51

## 2022-10-09 RX ADMIN — AMOXICILLIN 1000 MG: 500 CAPSULE ORAL at 21:21

## 2022-10-09 RX ADMIN — EZETIMIBE 10 MG: 10 TABLET ORAL at 21:21

## 2022-10-09 RX ADMIN — ASPIRIN 81 MG: 81 TABLET, CHEWABLE ORAL at 21:21

## 2022-10-09 RX ADMIN — GABAPENTIN 300 MG: 300 CAPSULE ORAL at 09:49

## 2022-10-09 RX ADMIN — ENOXAPARIN SODIUM 40 MG: 100 INJECTION SUBCUTANEOUS at 09:49

## 2022-10-09 RX ADMIN — OXYCODONE AND ACETAMINOPHEN 2 TABLET: 325; 10 TABLET ORAL at 21:50

## 2022-10-09 RX ADMIN — GABAPENTIN 300 MG: 300 CAPSULE ORAL at 21:21

## 2022-10-09 RX ADMIN — PANTOPRAZOLE SODIUM 40 MG: 40 TABLET, DELAYED RELEASE ORAL at 04:48

## 2022-10-09 RX ADMIN — INSULIN LISPRO 4 UNITS: 100 INJECTION, SOLUTION INTRAVENOUS; SUBCUTANEOUS at 16:48

## 2022-10-09 RX ADMIN — TOPIRAMATE 100 MG: 100 TABLET, FILM COATED ORAL at 21:21

## 2022-10-09 RX ADMIN — AMPICILLIN SODIUM 2000 MG: 2 INJECTION, POWDER, FOR SOLUTION INTRAMUSCULAR; INTRAVENOUS at 04:14

## 2022-10-09 RX ADMIN — CELECOXIB 200 MG: 200 CAPSULE ORAL at 10:29

## 2022-10-09 RX ADMIN — TRAZODONE HYDROCHLORIDE 50 MG: 50 TABLET ORAL at 21:21

## 2022-10-09 RX ADMIN — POTASSIUM CHLORIDE 40 MEQ: 1500 TABLET, EXTENDED RELEASE ORAL at 16:47

## 2022-10-09 RX ADMIN — INSULIN LISPRO 4 UNITS: 100 INJECTION, SOLUTION INTRAVENOUS; SUBCUTANEOUS at 13:07

## 2022-10-09 RX ADMIN — TIZANIDINE 4 MG: 2 TABLET ORAL at 10:30

## 2022-10-09 RX ADMIN — OXYCODONE AND ACETAMINOPHEN 2 TABLET: 325; 10 TABLET ORAL at 04:32

## 2022-10-09 ASSESSMENT — PAIN DESCRIPTION - LOCATION
LOCATION: BACK
LOCATION: BACK
LOCATION: BACK;GENERALIZED
LOCATION: GENERALIZED

## 2022-10-09 ASSESSMENT — PAIN SCALES - GENERAL
PAINLEVEL_OUTOF10: 7
PAINLEVEL_OUTOF10: 8
PAINLEVEL_OUTOF10: 4
PAINLEVEL_OUTOF10: 7

## 2022-10-09 ASSESSMENT — PAIN DESCRIPTION - ORIENTATION
ORIENTATION: MID;POSTERIOR
ORIENTATION: MID

## 2022-10-09 ASSESSMENT — PAIN DESCRIPTION - DESCRIPTORS
DESCRIPTORS: ACHING
DESCRIPTORS: ACHING
DESCRIPTORS: ACHING;SHARP;SPASM

## 2022-10-09 ASSESSMENT — PAIN - FUNCTIONAL ASSESSMENT: PAIN_FUNCTIONAL_ASSESSMENT: PREVENTS OR INTERFERES SOME ACTIVE ACTIVITIES AND ADLS

## 2022-10-09 ASSESSMENT — PAIN DESCRIPTION - PAIN TYPE: TYPE: CHRONIC PAIN;ACUTE PAIN

## 2022-10-09 ASSESSMENT — PAIN SCALES - WONG BAKER: WONGBAKER_NUMERICALRESPONSE: 2

## 2022-10-09 NOTE — PROGRESS NOTES
Hospitalist Progress Note   Admit Date:  10/4/2022  9:06 PM   Name:  Jd Yarbrough   Age:  69 y.o. Sex:  female  :  1955   MRN:  [id number]   Room:  [facility]    Presenting Complaint: Back pain and fatigue  Reason(s) for Admission: Cellulitis [L03.90]  JOSE (acute kidney injury) ([facility] [address] [N17.9]  Cellulitis of right lower extremity [L03.115]  Sepsis, due to unspecified organism, unspecified whether acute organ dysfunction present [facility]) [A41.9]  Bacteremia due to Gram-negative bacteria [facility] Course & Interval History:   69year old female with chronic pain status post neurostimulator placement on 22, hypertension, diabetes type 2, fibromyalgia presented to emergency room on 10/5/22 with chief complaint of generalized body ache, generalized weakness, poor appetite for past few days duration with dalia falls. Patient reported low-grade fever at home, history of right lower extremity redness involving just above ankle for past few days duration. Further work-up in emergency room shows evidence of elevated white count, procalcitonin is significantly elevated at 15. Patient was initiated on broad-spectrum antibiotics, chest x-ray did not show any evidence of infiltrates, urinalysis does not show evidence of UTI. Blood cultures grew group B strep so she was started on Ampicillin by ID. Subjective/24hr Events (10/09/22): She complains of 7/10 neck and back pain. Denies CP/SOB. Denies abdominal pain. Denies N/V/D. Denies F/C.       Assessment & Plan:     Principal Problem:    Bacteremia due to Gram-positive bacteria  - 10/4 Blood cultures with GBS  - 10/6 Repeat blood cultures with Beta Hemolytic strep, likely contaminant  - 10/8 Repeat blood cultures taken this AM  - Likely due to RLE cellulitis  - Stopped Zosyn  - Stopped Vancomycin  - 10/8 Continue Ampicillin until blood cultures negative  - 10/9 Change Ampicillin to Amoxil 1g TID - EOT 10/14  - UA unremarkable  - Pain stimulator area not red or tender  - CXR clear  - 10/4 Procalcitonin 15  - 10/9 Repeat Procalcitonin tomorrow AM  - ECHO normal  - 10/6 CT C/T spine w contrast with no fluid pockets  - 10/4 CTAP with fluid around spinal stimulator  - Appreciate ID's input and assistance    Active Problems:    Cellulitis of right lower extremity  - Unsure if true cellulitis  - Has well demarcated lines and patient/ states it's been there a while  - Had right second to wound - followed by podiatry as OP  - 10/8 Continue Ampicillin  - 10/9 Change Ampicillin to Amoxil 1g TID - EOT 10/14      HTN (hypertension)  - All BP meds held due to hypotension in ER  - BP now up  - Restart Coreg now  - Restart other BP meds as appropriate      Type 2 diabetes mellitus without complication (HCC)  - Stable  - Continue Humalog SSI      Chronic pain syndrome  - States that pain is a lot worse over past 4 weeks  - S/P spinal stimulator on 6/23/22  - Continue home Percocet 10  - Continue Dilaudid PRN      S/P insertion of spinal cord stimulator      Opioid dependence ([address]  - Be mindful of drowsiness and respiratory depression      Fibromyalgia      Generalized anxiety disorder      HEIDY (obstructive sleep apnea)      Class 1 Obesity    Diet:  ADULT DIET; Regular  DVT PPx: Lovenox  Code status: Full Code    Hospital Problems:  Principal Problem:    Bacteremia due to Gram-positive bacteria  Active Problems:    Cellulitis of right lower extremity    HTN (hypertension)    Paroxysmal atrial fibrillation (HCC)    Type 2 diabetes mellitus without complication (HCC)    Chronic pain syndrome    S/P insertion of spinal cord stimulator    Opioid dependence (HCC)    Fibromyalgia    Generalized anxiety disorder    HEIDY (obstructive sleep apnea)    Primary hypothyroidism    Obesity  Resolved Problems:    * No resolved hospital problems.  *      Discharge Plan:     Location: [facility]  Days: 2-3  PPD Ordered: 10/5/22      Objective:   Patient Vitals for the past 24 hrs:   Temp Pulse Resp BP SpO2   10/09/22 0845 98.8 °F (37.1 °C) 60 12 (!) 139/57 92 %   10/09/22 0326 98.8 °F (37.1 °C) 62 15 (!) 149/70 95 %   10/08/22 2335 98.5 °F (36.9 °C) 58 16 (!) 137/44 95 %   10/08/22 1941 99.4 °F (37.4 °C) 69 16 (!) 143/60 94 %   10/08/22 1757 -- -- 17 -- --   10/08/22 1510 97.7 °F (36.5 °C) 69 18 (!) 155/54 92 %   10/08/22 1118 97.7 °F (36.5 °C) 61 17 101/63 90 %   10/08/22 1107 -- -- 17 -- --   10/08/22 1037 -- -- 17 -- --   10/08/22 1000 -- -- 17 -- --   10/08/22 0930 -- -- 18 -- --       Oxygen Therapy  SpO2: 92 %  Pulse Oximetry Type: Intermittent  Pulse via Oximetry: 94 beats per minute  Pulse Oximeter Device Mode: Intermittent  O2 Device: None (Room air)    Estimated body mass index is 32.11 kg/m² as calculated from the following:    Height as of this encounter: 5' 7\" (1.702 m). Weight as of this encounter: 205 lb (93 kg). No intake or output data in the 24 hours ending 10/09/22 0911        Physical Exam:   Blood pressure (!) 139/57, pulse 60, temperature 98.8 °F (37.1 °C), temperature source Oral, resp. rate 12, height 5' 7\" (1.702 m), weight 205 lb (93 kg), SpO2 92 %. General:    Well nourished. In moderate distress, moaning  Head:  Normocephalic, atraumatic  Eyes:  Sclerae appear normal.  Pupils equally round. ENT:  Nares appear normal, no drainage. Moist oral mucosa  Neck:  No restricted ROM. Trachea midline   CV:   RRR. No m/r/g. No jugular venous distension. Lungs:   CTAB. No wheezing, rhonchi, or rales. Respirations even, unlabored  Abdomen: Bowel sounds present. Soft, nontender, nondistended. Extremities: No cyanosis or clubbing. No edema  Skin:     RLE with well demarcated area of erythema. Right second toe with wound. Warm and dry. Neuro:  CN II-XII grossly intact. Sensation intact. A&Ox3  Psych:  Normal mood and affect.       I have reviewed ordered lab tests and independently visualized imaging below:    Recent Labs:  Recent Results (from the past 48 hour(s)) POCT Glucose    Collection Time: 10/07/22 11:40 AM   Result Value Ref Range    POC Glucose 248 (H) 65 - 100 mg/dL    Performed by: Zack    POCT Glucose    Collection Time: 10/07/22  4:17 PM   Result Value Ref Range    POC Glucose 251 (H) 65 - 100 mg/dL    Performed by: Zack    PLEASE READ & DOCUMENT PPD TEST IN 48 HRS    Collection Time: 10/07/22  5:47 PM   Result Value Ref Range    PPD, (POC) Negative Negative    mm Induration 0 0 - 5 mm   POCT Glucose    Collection Time: 10/07/22  9:20 PM   Result Value Ref Range    POC Glucose 259 (H) 65 - 100 mg/dL    Performed by: Mei    CBC    Collection Time: 10/08/22  3:59 AM   Result Value Ref Range    WBC 11.8 (H) 4.3 - 11.1 K/uL    RBC 3.75 (L) 4.05 - 5.2 M/uL    Hemoglobin 11.2 (L) 11.7 - 15.4 g/dL    Hematocrit 33.9 (L) 35.8 - 46.3 %    MCV 90.4 79.6 - 97.8 FL    MCH 29.9 26.1 - 32.9 PG    MCHC 33.0 31.4 - 35.0 g/dL    RDW 13.6 11.9 - 14.6 %    Platelets 257 (L) 500 - 450 K/uL    MPV 10.8 9.4 - 12.3 FL    nRBC 0.00 0.0 - 0.2 K/uL   Basic Metabolic Panel w/ Reflex to MG    Collection Time: 10/08/22  3:59 AM   Result Value Ref Range    Sodium 132 (L) 136 - 145 mmol/L    Potassium 3.1 (L) 3.5 - 5.1 mmol/L    Chloride 99 (L) 101 - 110 mmol/L    CO2 25 21 - 32 mmol/L    Anion Gap 8 4 - 13 mmol/L    Glucose 292 (H) 65 - 100 mg/dL    BUN 16 8 - 23 MG/DL    Creatinine 0.72 0.6 - 1.0 MG/DL    Est, Glom Filt Rate >60 >60 ml/min/1.73m2    Calcium 8.3 8.3 - 10.4 MG/DL   Magnesium    Collection Time: 10/08/22  3:59 AM   Result Value Ref Range    Magnesium 1.7 (L) 1.8 - 2.4 mg/dL   POCT Glucose    Collection Time: 10/08/22  6:26 AM   Result Value Ref Range    POC Glucose 270 (H) 65 - 100 mg/dL    Performed by: Mei    POCT Glucose    Collection Time: 10/08/22 11:24 AM   Result Value Ref Range    POC Glucose 246 (H) 65 - 100 mg/dL    Performed by: Waqas    POCT Glucose    Collection Time: 10/08/22  4:41 PM   Result Value Ref Range POC Glucose 233 (H) 65 - 100 mg/dL    Performed by: Waqas    POCT Glucose    Collection Time: 10/08/22  8:39 PM   Result Value Ref Range    POC Glucose 296 (H) 65 - 100 mg/dL    Performed by: Nikki    CBC    Collection Time: 10/09/22  4:17 AM   Result Value Ref Range    WBC 13.4 (H) 4.3 - 11.1 K/uL    RBC 3.81 (L) 4.05 - 5.2 M/uL    Hemoglobin 11.4 (L) 11.7 - 15.4 g/dL    Hematocrit 34.4 (L) 35.8 - 46.3 %    MCV 90.3 79.6 - 97.8 FL    MCH 29.9 26.1 - 32.9 PG    MCHC 33.1 31.4 - 35.0 g/dL    RDW 13.5 11.9 - 14.6 %    Platelets 724 458 - 134 K/uL    MPV 10.2 9.4 - 12.3 FL    nRBC 0.00 0.0 - 0.2 K/uL   Basic Metabolic Panel w/ Reflex to MG    Collection Time: 10/09/22  4:17 AM   Result Value Ref Range    Sodium 135 (L) 136 - 145 mmol/L    Potassium 3.4 (L) 3.5 - 5.1 mmol/L    Chloride 101 101 - 110 mmol/L    CO2 25 21 - 32 mmol/L    Anion Gap 9 4 - 13 mmol/L    Glucose 256 (H) 65 - 100 mg/dL    BUN 13 8 - 23 MG/DL    Creatinine 0.68 0.6 - 1.0 MG/DL    Est, Glom Filt Rate >60 >60 ml/min/1.73m2    Calcium 8.7 8.3 - 10.4 MG/DL   Magnesium    Collection Time: 10/09/22  4:17 AM   Result Value Ref Range    Magnesium 2.0 1.8 - 2.4 mg/dL   POCT Glucose    Collection Time: 10/09/22  6:26 AM   Result Value Ref Range    POC Glucose 234 (H) 65 - 100 mg/dL    Performed by: Nikki        Other Studies:  CT ABDOMEN PELVIS W IV CONTRAST Additional Contrast? None    Result Date: 10/5/2022  EXAM: CT ABDOMEN PELVIS W IV CONTRAST HISTORY: sepsis. TECHNIQUE: Axial images of the abdomen and pelvis were performed following the administration of intravenous contrast. Images were obtained in the axial plane and coronal reformatted images were created and reviewed. Dose reduction technique used: Automated exposure control/Adjustment of the mA and/or kV according to patient size/Use of iterative reconstruction technique. 100 mL of Isovue-370 were utilized. COMPARISON: CT chest dated 9/12/2009.  FINDINGS: expanded without evidence for pneumothorax. No consolidative airspace process or pleural effusion is seen. 1.  Limited study without significant acute changes evident.        Current Meds:  Current Facility-Administered Medications   Medication Dose Route Frequency    potassium chloride (KLOR-CON M) extended release tablet 40 mEq  40 mEq Oral PRN    Or    potassium bicarb-citric acid (EFFER-K) effervescent tablet 40 mEq  40 mEq Oral PRN    Or    potassium chloride 10 mEq/100 mL IVPB (Peripheral Line)  10 mEq IntraVENous PRN    oxyCODONE-acetaminophen (PERCOCET)  MG per tablet 1 tablet  1 tablet Oral Q4H PRN    oxyCODONE-acetaminophen (PERCOCET)  MG per tablet 2 tablet  2 tablet Oral Q4H PRN    ampicillin 2000 mg ivpb mini bag  2,000 mg IntraVENous 6 times per day    spironolactone (ALDACTONE) tablet 25 mg  25 mg Oral Daily    gabapentin (NEURONTIN) capsule 300 mg  300 mg Oral TID    DULoxetine (CYMBALTA) extended release capsule 60 mg  60 mg Oral Nightly    carvedilol (COREG) tablet 6.25 mg  6.25 mg Oral BID WC    levothyroxine (SYNTHROID) tablet 125 mcg  125 mcg Oral Daily    pantoprazole (PROTONIX) tablet 40 mg  40 mg Oral QAM AC    ezetimibe (ZETIA) tablet 10 mg  10 mg Oral Nightly    sodium chloride flush 0.9 % injection 5-40 mL  5-40 mL IntraVENous 2 times per day    sodium chloride flush 0.9 % injection 5-40 mL  5-40 mL IntraVENous PRN    0.9 % sodium chloride infusion   IntraVENous PRN    enoxaparin (LOVENOX) injection 40 mg  40 mg SubCUTAneous Daily    ondansetron (ZOFRAN-ODT) disintegrating tablet 4 mg  4 mg Oral Q8H PRN    Or    ondansetron (ZOFRAN) injection 4 mg  4 mg IntraVENous Q6H PRN    polyethylene glycol (GLYCOLAX) packet 17 g  17 g Oral Daily PRN    insulin lispro (HUMALOG) injection vial 0-8 Units  0-8 Units SubCUTAneous TID WC    insulin lispro (HUMALOG) injection vial 0-4 Units  0-4 Units SubCUTAneous Nightly    glucose chewable tablet 16 g  4 tablet Oral PRN    dextrose bolus 10% 125 mL  125 mL IntraVENous PRN    Or    dextrose bolus 10% 250 mL  250 mL IntraVENous PRN    glucagon (rDNA) injection 1 mg  1 mg SubCUTAneous PRN    dextrose 10 % infusion   IntraVENous Continuous PRN    aspirin chewable tablet 81 mg  81 mg Oral Nightly    traZODone (DESYREL) tablet 50 mg  50 mg Oral Nightly    topiramate (TOPAMAX) tablet 100 mg  100 mg Oral BID    tiZANidine (ZANAFLEX) tablet 4 mg  4 mg Oral Q12H PRN       Signed:  Olesya Fields DO  10/5/22

## 2022-10-09 NOTE — PLAN OF CARE
Patient has positive blood cultures again on 10/06/22- GBS - strep agalactiae   Has pending blood cultures from 10/9/22  If positive again, patient will need a SKYLA   Suspect infection is associated with LE cellulitis vs superficial infection around stimulator  TTE was negative - appreciate that it was a technically difficult study .     Given repeat positive test will treat for 14 days from clearance - Agree with high dose amoxicillin for now  If cultures pop back positive again, then will need SKYLA and further evaluation of spinal stimulator

## 2022-10-09 NOTE — PROGRESS NOTES
ACUTE OCCUPATIONAL THERAPY GOALS:   (Developed with and agreed upon by patient and/or caregiver.)  . 1. Patient will perform grooming with min assist.   2. Patient will perform rolling mod assist to assist with stefani-care and adls. 3. Patient will perform upper body bathing with mod assist.       Goals to be achieved in 7 days. OCCUPATIONAL THERAPY Daily Note and PM       OT Visit Days: 2  Acknowledge Orders  Time  OT Charge Capture  Rehab Caseload Tracker      Dominique Hairston is a 69 y.o. female   PRIMARY DIAGNOSIS: Bacteremia due to Gram-positive bacteria  Cellulitis [L03.90]  JOSE (acute kidney injury) ([facility] [N17.9]  Cellulitis of right lower extremity [L03.115]  Sepsis, due to unspecified organism, unspecified whether acute organ dysfunction present ([facility] [A41.9]  Bacteremia due to Gram-negative bacteria [R78.81]       Reason for Referral: Generalized Muscle Weakness (M62.81)  Other lack of cordination (R27.8)  Inpatient: Payor: MEDICARE / Plan: MEDICARE PART A AND B / Product Type: *No Product type* /     ASSESSMENT:     REHAB RECOMMENDATIONS:   Recommendation to date pending progress:  Setting:  Short-term Rehab    Equipment:    To Be Determined     ASSESSMENT:  Ms. Jyoti Finney admitted with above diagnosis. Pt presents with decreased self care, functional mobility and strength. Pt would benefit from skilled OT to increase independence. Pt confused and agitated this am. Pt with saturated brief. Pt max assist x 2 to roll to left and right for stefani-care cleaning. Pt total assist to don/doff brief. Pt reports pain in her neck, back and head. Pt with spinal stimulator. Pt too agitated to try sitting edge of bed. Recommend STR at d/c.      10/9/22: Pt presents sitting up in recliner visiting with family in room. She is agreeable to OT and PT co-treatment session. She required additional time to work through all activities and presents as more alert today.  She was able to stand from recliner with min A x2 and take steps to bed with RW and cues for walker placement. She demonstrated improved sitting balance and tolerance EOB in prep for OOB ADLs. She declined toilet t/f or practice, but showed improved ability to perform sit>supine. Good progress today.        SUBJECTIVE:     Ms. Lezlie Severs states, \"I've got cervical stenosis in my neck\"     Social/Functional Lives With: Spouse  Type of Home: House  Home Layout: Multi-level, Bed/Bath upstairs  Home Access: Stairs to enter without rails  Entrance Stairs - Number of Steps: 3  Receives Help From: Family  ADL Assistance: [address]: Needs assistance  Homemaking Responsibilities: No  Ambulation Assistance: Independent  Active : No (has a license but doesn't drive)  Occupation: Retired    OBJECTIVE:     [address] [place] [address] / Maeve Sow / Kala Solorzano: None    RESTRICTIONS/PRECAUTIONS:  Restrictions/Precautions: Fall Risk    PAIN: Sonali Salen / O2:   Pre Treatment:          Post Treatment: reports pain in knee and neck during session but does not rate her pain       Vitals          Oxygen            MOBILITY: I Mod I S SBA CGA Min Mod Max Total  NT x2 Comments:   Bed Mobility    Rolling [] [] [] [] [] [] [] [] [] [x] []    Supine to Sit [] [] [] [] [] [] [] [] [] [x] []    Scooting [] [] [] [] [] [] [] [] [] [x] []    Sit to Supine [] [] [] [] [] [x] [] [] [] [] []    Transfers    Sit to Stand [] [] [] [] [] [x] [] [] [] [] [x] Additional time   Bed to Chair [] [] [] [] [] [x] [] [] [] [] [x] Rw, additional time   Stand to Sit [] [] [] [] [] [x] [] [] [] [] [x]    Tub/Shower [] [] [] [] [] [] [] [] [] [] []     Toilet [] [] [] [] [] [] [] [] [] [] []  Declined to practice toilet t/f    [] [] [] [] [] [] [] [] [] [] []    I=Independent, Mod I=Modified Independent, S=Supervision/Setup, SBA=Standby Assistance, CGA=Contact Guard Assistance, Min=Minimal Assistance, Mod=Moderate Assistance, Max=Maximal Assistance, Total=Total Assistance, NT=Not Tested    ACTIVITIES OF DAILY LIVING: I Mod I S SBA CGA Min Mod Max Total NT Comments   BASIC ADLs:              Upper Body Bathing  [] [] [] [] [] [] [] [] [] []    Lower Body Bathing [] [] [] [] [] [] [] [] [] []    Toileting [] [] [] [] [] [] [] [] [] []    Upper Body Dressing [] [] [] [] [] [] [] [] [] []    Lower Body Dressing [] [] [] [] [] [] [] [] [] []    Feeding [] [] [] [] [] [] [] [] [] []    Grooming [] [] [] [] [] [] [] [] [] []    Personal Device Care [] [] [] [] [] [x] [] [] [] []    Functional Mobility [] [] [] [] [] [x] [] [] [] [] X2, RW, and additional time   I=Independent, Mod I=Modified Independent, S=Supervision/Setup, SBA=Standby Assistance, CGA=Contact Guard Assistance, Min=Minimal Assistance, Mod=Moderate Assistance, Max=Maximal Assistance, Total=Total Assistance, NT=Not Tested    PLAN:   [facility] of Care: 3 times/week for duration of hospital stay or until stated goals are met, whichever comes first.    PROBLEM LIST:   (Skilled intervention is medically necessary to address:)  Decreased ADL/Functional Activities  Decreased Activity Tolerance  Decreased AROM/PROM  Decreased Balance  Decreased Cognition  Decreased Strength  Decreased Transfer Abilities   INTERVENTIONS PLANNED:  (Benefits and precautions of occupational therapy have been discussed with the patient.)  Self Care Training  Therapeutic Activity  Therapeutic Exercise/HEP  Neuromuscular Re-education  Manual Therapy  Education         TREATMENT:     TREATMENT:   Co-Treatment PT/OT necessary due to patient's decreased overall endurance/tolerance levels, as well as need for high level skilled assistance to complete functional transfers/mobility and functional tasks  Neuromuscular Re-education (30 Minutes): Neuromuscular Re-education included Balance Training, Coordination training, Functional mobility with facilitation, Sitting balance training, and Standing balance training to improve Balance, Coordination, Functional Mobility, and Postural Control. TREATMENT GRID:  N/A    AFTER TREATMENT PRECAUTIONS: Bed, Bed/Chair Locked, Call light within reach, Needs within reach, RN notified, and Visitors at bedside    INTERDISCIPLINARY COLLABORATION:  RN/ PCT, PT/ PTA, and OT/ CASTILLO    EDUCATION:  Education Given To: Patient; Family  Education Provided: Role of Therapy;Plan of Care;Transfer Training;Energy Conservation;Equipment; Fall Prevention Strategies  Education Method: Verbal  Barriers to Learning: None  Education Outcome: Continued education needed    TOTAL TREATMENT DURATION AND TIME:  Time In: 8400  Time Out: 52 [facility]  Minutes: [address] [place], [place]

## 2022-10-09 NOTE — PROGRESS NOTES
ACUTE PHYSICAL THERAPY GOALS:   (Developed with and agreed upon by patient and/or caregiver.)  Goals set based on prior level of function. STG:  (1.)Ms. Hakan Drew will move from supine to sit and sit to supine  with MINIMAL ASSIST within 5 treatment day(s). (2.)Ms. Hakan Drew will transfer from bed to chair and chair to bed with MINIMAL ASSIST using the least restrictive device within 5 treatment day(s). (3.)Ms. Hakan Drew will ambulate with MINIMAL ASSIST for 10 feet with the least restrictive device within 5 treatment day(s). LTG:  (1.)Ms. Hakan Drew will move from supine to sit and sit to supine  in bed with STAND BY ASSIST within 10 treatment day(s). (2.)Ms. Hakan Drew will transfer from bed to chair and chair to bed with STAND BY ASSIST using the least restrictive device within 10 treatment day(s). (3.)Ms. Hakan Drew will ambulate with STAND BY ASSIST for 50 feet with the least restrictive device within 10 treatment day(s). PHYSICAL THERAPY: Daily Note PM   (Link to Caseload Tracking: PT Visit Days : 5  Time In/Out PT Charge Capture  Rehab Caseload Tracker  Orders    Disha Garber is a 69 y.o. female   PRIMARY DIAGNOSIS: Bacteremia due to Gram-positive bacteria  Cellulitis [L03.90]  JOSE (acute [facility]) ([facility] [N17.9]  Cellulitis of right lower extremity [L03.115]  Sepsis, due to unspecified organism, unspecified whether acute organ dysfunction present ([facility] [A41.9]  Bacteremia due to Gram-negative bacteria [R78.81]       Inpatient: Payor: MEDICARE / Plan: MEDICARE PART A AND B / Product Type: *No Product type* /     ASSESSMENT:     REHAB RECOMMENDATIONS:   Recommendation to date pending progress:  Setting:  Short-term Rehab    Equipment:    To Be Determined     ASSESSMENT:  Ms. Hakan Drew with little tolerance for any activity. She remains easily agitated and confused. She needed lots of encouragement to work with therapy but then noted her brief was saturated.   Patient changed with maximum assist of 2 as she was very resistant to rolling L or R and hollered out \"no\" multiple times. Patient reports she has a spinal stimulator (turned on low) and head, neck, and back pain. She was too agitated after getting her cleaned up to attempt any further activity. Recommending STR at d/c as not sure if or how soon she'll get back to a reported baseline of independence with mobility. 10/9 - pt. Supine and agreeable to get up. Pt. Suzan Crumble like yesterday but awakens easily and converses when asked questions. She did some LE exercises in the bed with constant verbal and manual cues. RN present checking vitals. Pt.s BP A little low but pt asymptomatic. She sat up with assistance. BP was better sitting. She wiped her face with cloth while working on sitting balance unassisted. She stood by bed with significant assistance and brief doffed. New brief donned and she stood again to hike brief and took a couple of steps to the chair with RW and assistance. She was positioned comfortably in chair. All mobility is slow and takes extra time and she needs assistance with all of it. However, she did get in the chair for the second day in a row and is moving some better. She complains of pain when asked but did not grimace or verbalize pain. Plans are for snf. PM - pt. Up in chair, family present. Pt. Much more alert and talkative. Pt. Required less assistance to mobilize this pm and took some steps back to bed with RW. She still needs some guidance and cueing and assistance but is improving. She refused further gait and did not want to sit on the [facility]. She sat on edge of bed, BP checked 126/68. She returned to supine with only minimal assistance. Left supine without complaints and family present. Will continue to follow and encourage gait and increased activity. SUBJECTIVE:   Ms. Jammie Odonnell more alert.     Social/Functional Lives With: Spouse  Type of Home: House  Home Layout: Multi-level, Bed/Bath upstairs  Home Access: Stairs to enter without rails  Entrance Stairs - Number of Steps: 3  Receives Help From: Family  ADL Assistance: Independent  Homemaking Assistance: Needs assistance  Homemaking Responsibilities: No  Ambulation Assistance: Independent  Active : No (has a license but doesn't drive)  Occupation: Retired  OBJECTIVE:     PAIN: VITALS / O2: PRECAUTION / Milena South / Angela Cough:   Pre Treatment:          Post Treatment: spine pain when asked Vitals        Oxygen    None    RESTRICTIONS/PRECAUTIONS:  Restrictions/Precautions  Restrictions/Precautions: Fall Risk  Restrictions/Precautions: Fall Risk     MOBILITY: I Mod I S SBA CGA Min Mod Max Total  NT x2 Comments:   Bed Mobility    Rolling [] [] [] [] [] [] [] [] [] [] []    Supine to Sit [] [] [] [] [] [] [] [] [] [] []    Scooting [] [] [] [] [] [] [] [] [] [] []    Sit to Supine [] [] [] [] [] [x] [] [] [] [] []    Transfers    Sit to Stand [] [] [] [] [] [x] [] [] [] [] [x]    Bed to Chair [] [] [] [] [] [x] [] [] [] [] [x]    Stand to Sit [] [] [] [] [] [x] [] [] [] [] [x]     [] [] [] [] [] [] [] [] [] [] []    I=Independent, Mod I=Modified Independent, S=Supervision, SBA=Standby Assistance, CGA=Contact Guard Assistance,   Min=Minimal Assistance, Mod=Moderate Assistance, Max=Maximal Assistance, Total=Total Assistance, NT=Not Tested    BALANCE: Good Fair+ Fair Fair- Poor NT Comments   Sitting Static [] [x] [] [] [] []    Sitting Dynamic [] [x] [] [] [] []              Standing Static [] [] [x] [] [] [] With RW   Standing Dynamic [] [] [x] [] [] [] With RW     GAIT: I Mod I S SBA CGA Min Mod Max Total  NT x2 Comments:   Level of Assistance [] [] [] [] [] [x] [] [] [] [] [x]    Distance 5 feet    DME Rolling Walker    Gait Quality Decreased michelle , Decreased step clearance, Decreased step length, Decreased stance, Shuffling , Step-to, and Trunk sway increased    Weightbearing Status      Stairs      I=Independent, Mod I=Modified Independent, S=Supervision, SBA=Standby Assistance, CGA=Contact [employer],   Min=Minimal Assistance, Mod=Moderate Assistance, Max=Maximal Assistance, Total=Total Assistance, NT=Not Tested    PLAN:   FREQUENCY AND DURATION: Daily for duration of hospital stay or until stated goals are met, whichever comes first.    TREATMENT:   TREATMENT:   Therapeutic Activity (30 Minutes): Therapeutic activity included Sit to Supine, Transfer Training, Ambulation on level ground, Sitting balance , and Standing balance to improve functional Activity tolerance, Balance, Coordination, Mobility, Strength, and ROM.     TREATMENT GRID:  PROM for B UE/LE Date:  10/7  Date:  10/8 Date:  10/9   Activity/Exercise Parameters Parameters Parameters   Ankle pumps 12 15 15   Quad set  12 A 10 15   Heel slides 12 10aa 15aa   Hip ab/ad 12 10aa 15aa   SAQ 12     Shoulder flex/ext 12     Shoulder ab/ad 12     Elbow flex/ext 12     SLR  10aa 15aa         AFTER TREATMENT PRECAUTIONS: Alarm Activated, Bed, Bed/Chair Locked, Call light within reach, Needs within reach, RN notified, and Visitors at bedside    INTERDISCIPLINARY COLLABORATION:  RN/ PCT, PT/ PTA, and OT/ CASTILLO    EDUCATION:      TIME IN/OUT:  Time In: 1445  Time Out: 1  Minutes: [address], PT

## 2022-10-09 NOTE — CARE COORDINATION
69 yr-old MF with Bacteremia due to Gram-positive bacteria and cellulitis. Hx multiple medical dx including chronic pain. PT recommends STR as pt is total assist.  Spoke with pt; she is in agreement with STR. She lives in [facility]. Referrals made to [facility], [facility] and [facility]. 10/09/22 4320   Service Assessment   Patient Orientation Alert and Nate Rodriguez 94 Family Members   PCP Verified by CM Yes   Discharge Planning   Patient expects to be discharged to: Charissa Lorenzo [facility] Discharge   Services At/After Discharge [facility])   Condition of Participation: Discharge Planning   The Plan for Transition of Care is related to the following treatment goals: Increase independence   Freedom of Choice list was provided with basic dialogue that supports the patient's individualized plan of care/goals, treatment preferences, and shares the quality data associated with the providers?   Yes

## 2022-10-09 NOTE — PROGRESS NOTES
ACUTE PHYSICAL THERAPY GOALS:   (Developed with and agreed upon by patient and/or caregiver.)  Goals set based on prior level of function. STG:  (1.)Ms. Florence Smith will move from supine to sit and sit to supine  with MINIMAL ASSIST within 5 treatment day(s). (2.)Ms. Florence Smith will transfer from bed to chair and chair to bed with MINIMAL ASSIST using the least restrictive device within 5 treatment day(s). (3.)Ms. Florence Smith will ambulate with MINIMAL ASSIST for 10 feet with the least restrictive device within 5 treatment day(s). LTG:  (1.)Ms. Florence Smith will move from supine to sit and sit to supine  in bed with STAND BY ASSIST within 10 treatment day(s). (2.)Ms. Florence Smith will transfer from bed to chair and chair to bed with STAND BY ASSIST using the least restrictive device within 10 treatment day(s). (3.)Ms. Florence Smith will ambulate with STAND BY ASSIST for 50 feet with the least restrictive device within 10 treatment day(s). PHYSICAL THERAPY: Daily Note AM   (Link to Caseload Tracking: PT Visit Days : 5  Time In/Out PT Charge Capture  Rehab Caseload Tracker  Orders    Laura Maxwell is a 69 y.o. female   PRIMARY DIAGNOSIS: Bacteremia due to Gram-positive bacteria  Cellulitis [L03.90]  JOSE (acute kidney injury) ([facility] [N17.9]  Cellulitis of right lower extremity [L03.115]  Sepsis, due to unspecified organism, unspecified whether acute organ dysfunction present ([facility] [A41.9]  Bacteremia due to Gram-negative bacteria [R78.81]       Inpatient: Payor: MEDICARE / Plan: MEDICARE PART A AND B / Product Type: *No Product type* /     ASSESSMENT:     REHAB RECOMMENDATIONS:   Recommendation to date pending progress:  Setting:  Short-term Rehab    Equipment:    To Be Determined     ASSESSMENT:  Ms. Henriquez Pa with little tolerance for any activity. She remains easily agitated and confused. She needed lots of encouragement to work with therapy but then noted her brief was saturated.   Patient changed with maximum assist of 2 as she was very resistant to rolling L or R and hollered out \"no\" multiple times. Patient reports she has a spinal stimulator (turned on low) and head, neck, and back pain. She was too agitated after getting her cleaned up to attempt any further activity. Recommending STR at d/c as not sure if or how soon she'll get back to a reported baseline of independence with mobility. 10/9 - pt. Supine and agreeable to get up. Pt. Amada Mcdowell like yesterday but awakens easily and converses when asked questions. She did some LE exercises in the bed with constant verbal and manual cues. RN present checking vitals. Pt.s BP A little low but pt asymptomatic. She sat up with assistance. BP was better sitting. She wiped her face with cloth while working on sitting balance unassisted. She stood by bed with significant assistance and brief doffed. New brief donned and she stood again to hike brief and took a couple of steps to the chair with RW and assistance. She was positioned comfortably in chair. All mobility is slow and takes extra time and she needs assistance with all of it. However, she did get in the chair for the second day in a row and is moving some better. She complains of pain when asked but did not grimace or verbalize pain. Plans are for snf. SUBJECTIVE:   Ms. Nakia Gonzales states, \"I can use my pull up\".     Social/Functional Lives With: Spouse  Type of Home: House  Home Layout: Multi-level, Bed/Bath upstairs  Home Access: Stairs to enter without rails  Entrance Stairs - Number of Steps: 3  Receives Help From: Family  ADL Assistance: Independent  Homemaking Assistance: Needs assistance  Homemaking Responsibilities: No  Ambulation Assistance: Independent  Active : No (has a license but doesn't drive)  Occupation: Retired  OBJECTIVE:     PAIN: VITALS / O2: PRECAUTION / Josefine New / DRAINS:   Pre Treatment:          Post Treatment: spine pain when asked Vitals        Oxygen None    RESTRICTIONS/PRECAUTIONS:  Restrictions/Precautions  Restrictions/Precautions: Fall Risk  Restrictions/Precautions: Fall Risk     MOBILITY: I Mod I S SBA CGA Min Mod Max Total  NT x2 Comments:   Bed Mobility    Rolling [] [] [] [] [] [] [] [] [] [] []    Supine to Sit [] [] [] [] [] [] [] [x] [] [] [x]    Scooting [] [] [] [] [] [] [] [x] [] [] [x]    Sit to Supine [] [] [] [] [] [] [] [] [] [] []    Transfers    Sit to Stand [] [] [] [] [] [] [x] [] [] [] [x]    Bed to Chair [] [] [] [] [] [] [x] [] [] [] [x]    Stand to Sit [] [] [] [] [] [x] [] [] [] [] [x]     [] [] [] [] [] [] [] [] [] [] []    I=Independent, Mod I=Modified Independent, S=Supervision, SBA=Standby Assistance, CGA=Contact Guard Assistance,   Min=Minimal Assistance, Mod=Moderate Assistance, Max=Maximal Assistance, Total=Total Assistance, NT=Not Tested    BALANCE: Good Fair+ Fair Fair- Poor NT Comments   Sitting Static [] [] [x] [] [] []    Sitting Dynamic [] [] [x] [] [] []              Standing Static [] [] [x] [] [] [] With RW   Standing Dynamic [] [] [x] [] [] [] With RW     GAIT: I Mod I S SBA CGA Min Mod Max Total  NT x2 Comments:   Level of Assistance [] [] [] [] [] [x] [x] [] [] [] [x]    Distance 3 feet    DME Rolling Walker    Gait Quality Decreased michelle , Decreased step clearance, Decreased step length, Decreased stance, Shuffling , Step-to, and Trunk sway increased    Weightbearing Status      Stairs      I=Independent, Mod I=Modified Independent, S=Supervision, SBA=Standby Assistance, CGA=Contact Guard Assistance,   Min=Minimal Assistance, Mod=Moderate Assistance, Max=Maximal Assistance, Total=Total Assistance, NT=Not Tested    PLAN:   FREQUENCY AND DURATION: Daily for duration of hospital stay or until stated goals are met, whichever comes first.    TREATMENT:   TREATMENT:   Therapeutic Activity (45 Minutes):  Therapeutic activity included Supine to Sit, Scooting, Transfer Training, Ambulation on level ground, Sitting balance , Standing balance, and exercises to improve functional Activity tolerance, Balance, Coordination, Mobility, Strength, and ROM.     TREATMENT GRID:  PROM for B UE/LE Date:  10/7  Date:  10/8 Date:  10/9   Activity/Exercise Parameters Parameters Parameters   Ankle pumps 12 15 15   Quad set  12 A 10 15   Heel slides 12 10aa 15aa   Hip ab/ad 12 10aa 15aa   SAQ 12     Shoulder flex/ext 12     Shoulder ab/ad 12     Elbow flex/ext 12     SLR  10aa 15aa         AFTER TREATMENT PRECAUTIONS: Bed/Chair Locked, Call light within reach, Chair, Needs within reach, and RN notified    INTERDISCIPLINARY COLLABORATION:  RN/ PCT and PT/ PTA    EDUCATION:      TIME IN/OUT:  Time In: 1120  Time Out: [address]  Minutes: [address], PT

## 2022-10-10 LAB
ANION GAP SERPL CALC-SCNC: 6 MMOL/L (ref 4–13)
BUN SERPL-MCNC: 13 MG/DL (ref 8–23)
CALCIUM SERPL-MCNC: 8.8 MG/DL (ref 8.3–10.4)
CHLORIDE SERPL-SCNC: 104 MMOL/L (ref 101–110)
CO2 SERPL-SCNC: 25 MMOL/L (ref 21–32)
CREAT SERPL-MCNC: 0.67 MG/DL (ref 0.6–1)
ERYTHROCYTE [DISTWIDTH] IN BLOOD BY AUTOMATED COUNT: 13.9 % (ref 11.9–14.6)
GLUCOSE BLD STRIP.AUTO-MCNC: 226 MG/DL (ref 65–100)
GLUCOSE BLD STRIP.AUTO-MCNC: 227 MG/DL (ref 65–100)
GLUCOSE BLD STRIP.AUTO-MCNC: 231 MG/DL (ref 65–100)
GLUCOSE BLD STRIP.AUTO-MCNC: 248 MG/DL (ref 65–100)
GLUCOSE BLD STRIP.AUTO-MCNC: 268 MG/DL (ref 65–100)
GLUCOSE SERPL-MCNC: 242 MG/DL (ref 65–100)
HCT VFR BLD AUTO: 34.1 % (ref 35.8–46.3)
HGB BLD-MCNC: 11.1 G/DL (ref 11.7–15.4)
MCH RBC QN AUTO: 30 PG (ref 26.1–32.9)
MCHC RBC AUTO-ENTMCNC: 32.6 G/DL (ref 31.4–35)
MCV RBC AUTO: 92.2 FL (ref 79.6–97.8)
NRBC # BLD: 0 K/UL (ref 0–0.2)
PLATELET # BLD AUTO: 265 K/UL (ref 150–450)
PMV BLD AUTO: 10.3 FL (ref 9.4–12.3)
POTASSIUM SERPL-SCNC: 4.3 MMOL/L (ref 3.5–5.1)
PROCALCITONIN SERPL-MCNC: 0.49 NG/ML (ref 0–0.49)
RBC # BLD AUTO: 3.7 M/UL (ref 4.05–5.2)
SARS-COV-2 RDRP RESP QL NAA+PROBE: NOT DETECTED
SERVICE CMNT-IMP: ABNORMAL
SODIUM SERPL-SCNC: 135 MMOL/L (ref 136–145)
SOURCE: NORMAL
WBC # BLD AUTO: 16.3 K/UL (ref 4.3–11.1)

## 2022-10-10 PROCEDURE — 97530 THERAPEUTIC ACTIVITIES: CPT

## 2022-10-10 PROCEDURE — 87635 SARS-COV-2 COVID-19 AMP PRB: CPT

## 2022-10-10 PROCEDURE — 6360000002 HC RX W HCPCS: Performed by: HOSPITALIST

## 2022-10-10 PROCEDURE — 84145 PROCALCITONIN (PCT): CPT

## 2022-10-10 PROCEDURE — 82962 GLUCOSE BLOOD TEST: CPT

## 2022-10-10 PROCEDURE — 1100000000 HC RM PRIVATE

## 2022-10-10 PROCEDURE — 36415 COLL VENOUS BLD VENIPUNCTURE: CPT

## 2022-10-10 PROCEDURE — 85027 COMPLETE CBC AUTOMATED: CPT

## 2022-10-10 PROCEDURE — 6370000000 HC RX 637 (ALT 250 FOR IP): Performed by: HOSPITALIST

## 2022-10-10 PROCEDURE — 6370000000 HC RX 637 (ALT 250 FOR IP): Performed by: INTERNAL MEDICINE

## 2022-10-10 PROCEDURE — 80048 BASIC METABOLIC PNL TOTAL CA: CPT

## 2022-10-10 PROCEDURE — 2580000003 HC RX 258: Performed by: HOSPITALIST

## 2022-10-10 PROCEDURE — 97535 SELF CARE MNGMENT TRAINING: CPT

## 2022-10-10 PROCEDURE — 6370000000 HC RX 637 (ALT 250 FOR IP): Performed by: FAMILY MEDICINE

## 2022-10-10 RX ORDER — AMOXICILLIN 500 MG/1
1000 CAPSULE ORAL EVERY 8 HOURS SCHEDULED
Status: DISCONTINUED | OUTPATIENT
Start: 2022-10-10 | End: 2022-10-11 | Stop reason: HOSPADM

## 2022-10-10 RX ADMIN — OXYCODONE AND ACETAMINOPHEN 2 TABLET: 325; 10 TABLET ORAL at 05:51

## 2022-10-10 RX ADMIN — CELECOXIB 200 MG: 200 CAPSULE ORAL at 08:54

## 2022-10-10 RX ADMIN — SODIUM CHLORIDE, PRESERVATIVE FREE 10 ML: 5 INJECTION INTRAVENOUS at 09:02

## 2022-10-10 RX ADMIN — TIZANIDINE 4 MG: 2 TABLET ORAL at 11:42

## 2022-10-10 RX ADMIN — INSULIN LISPRO 2 UNITS: 100 INJECTION, SOLUTION INTRAVENOUS; SUBCUTANEOUS at 11:35

## 2022-10-10 RX ADMIN — SODIUM CHLORIDE, PRESERVATIVE FREE 10 ML: 5 INJECTION INTRAVENOUS at 21:49

## 2022-10-10 RX ADMIN — ENOXAPARIN SODIUM 40 MG: 100 INJECTION SUBCUTANEOUS at 08:54

## 2022-10-10 RX ADMIN — GABAPENTIN 300 MG: 300 CAPSULE ORAL at 08:54

## 2022-10-10 RX ADMIN — AMOXICILLIN 1000 MG: 500 CAPSULE ORAL at 13:53

## 2022-10-10 RX ADMIN — SPIRONOLACTONE 25 MG: 25 TABLET ORAL at 08:53

## 2022-10-10 RX ADMIN — TIZANIDINE 4 MG: 2 TABLET ORAL at 00:41

## 2022-10-10 RX ADMIN — CELECOXIB 200 MG: 200 CAPSULE ORAL at 21:51

## 2022-10-10 RX ADMIN — INSULIN LISPRO 2 UNITS: 100 INJECTION, SOLUTION INTRAVENOUS; SUBCUTANEOUS at 09:00

## 2022-10-10 RX ADMIN — AMOXICILLIN 1000 MG: 500 CAPSULE ORAL at 21:47

## 2022-10-10 RX ADMIN — DULOXETINE HYDROCHLORIDE 60 MG: 60 CAPSULE, DELAYED RELEASE ORAL at 21:49

## 2022-10-10 RX ADMIN — AMOXICILLIN 1000 MG: 500 CAPSULE ORAL at 05:51

## 2022-10-10 RX ADMIN — ASPIRIN 81 MG: 81 TABLET, CHEWABLE ORAL at 21:47

## 2022-10-10 RX ADMIN — TOPIRAMATE 100 MG: 100 TABLET, FILM COATED ORAL at 21:48

## 2022-10-10 RX ADMIN — INSULIN LISPRO 2 UNITS: 100 INJECTION, SOLUTION INTRAVENOUS; SUBCUTANEOUS at 17:23

## 2022-10-10 RX ADMIN — GABAPENTIN 300 MG: 300 CAPSULE ORAL at 21:48

## 2022-10-10 RX ADMIN — PANTOPRAZOLE SODIUM 40 MG: 40 TABLET, DELAYED RELEASE ORAL at 05:51

## 2022-10-10 RX ADMIN — TRAZODONE HYDROCHLORIDE 50 MG: 50 TABLET ORAL at 21:48

## 2022-10-10 RX ADMIN — EZETIMIBE 10 MG: 10 TABLET ORAL at 21:49

## 2022-10-10 RX ADMIN — TOPIRAMATE 100 MG: 100 TABLET, FILM COATED ORAL at 08:54

## 2022-10-10 RX ADMIN — OXYCODONE AND ACETAMINOPHEN 2 TABLET: 325; 10 TABLET ORAL at 11:37

## 2022-10-10 RX ADMIN — CARVEDILOL 6.25 MG: 6.25 TABLET, FILM COATED ORAL at 08:54

## 2022-10-10 RX ADMIN — OXYCODONE AND ACETAMINOPHEN 2 TABLET: 325; 10 TABLET ORAL at 21:47

## 2022-10-10 RX ADMIN — LEVOTHYROXINE SODIUM 125 MCG: 0.12 TABLET ORAL at 05:52

## 2022-10-10 RX ADMIN — SODIUM CHLORIDE, PRESERVATIVE FREE 10 ML: 5 INJECTION INTRAVENOUS at 21:50

## 2022-10-10 RX ADMIN — GABAPENTIN 300 MG: 300 CAPSULE ORAL at 13:53

## 2022-10-10 RX ADMIN — CARVEDILOL 6.25 MG: 6.25 TABLET, FILM COATED ORAL at 17:25

## 2022-10-10 RX ADMIN — TIZANIDINE 4 MG: 2 TABLET ORAL at 17:34

## 2022-10-10 RX ADMIN — OXYCODONE AND ACETAMINOPHEN 2 TABLET: 325; 10 TABLET ORAL at 17:33

## 2022-10-10 ASSESSMENT — PAIN SCALES - WONG BAKER: WONGBAKER_NUMERICALRESPONSE: 0

## 2022-10-10 ASSESSMENT — PAIN DESCRIPTION - LOCATION
LOCATION: GENERALIZED
LOCATION: OTHER (COMMENT)
LOCATION: BACK;NECK
LOCATION: BACK;LEG

## 2022-10-10 ASSESSMENT — PAIN SCALES - GENERAL
PAINLEVEL_OUTOF10: 8
PAINLEVEL_OUTOF10: 7
PAINLEVEL_OUTOF10: 5
PAINLEVEL_OUTOF10: 9
PAINLEVEL_OUTOF10: 0
PAINLEVEL_OUTOF10: 8
PAINLEVEL_OUTOF10: 6

## 2022-10-10 ASSESSMENT — PAIN DESCRIPTION - DESCRIPTORS
DESCRIPTORS: ACHING
DESCRIPTORS: ACHING

## 2022-10-10 ASSESSMENT — PAIN - FUNCTIONAL ASSESSMENT: PAIN_FUNCTIONAL_ASSESSMENT: PREVENTS OR INTERFERES WITH MANY ACTIVE NOT PASSIVE ACTIVITIES

## 2022-10-10 NOTE — CARE COORDINATION
SW met with patient to discuss bed offer at [facility]. Patient states that her first choice is [address] [place] [address] and she would like SW to reach out to the  who has yet to respond to referral for STR. SW provided the patient with a brochure for [facility] and choice list, called Filiberto and [facility]AR requesting a call back. Update: SAULO spoke with [facility] who is willing to accept the patient tomorrow to STR. DO anticipates discharge then. Packet prepared and EMS arranged for 2pm 10/11/22.     Michael Villa LMSW    [address] [facility]

## 2022-10-10 NOTE — PROGRESS NOTES
ACUTE OCCUPATIONAL THERAPY GOALS:   (Developed with and agreed upon by patient and/or caregiver.)  . 1. Patient will perform grooming with min assist.   2. Patient will perform rolling mod assist to assist with stefani-care and adls. 3. Patient will perform upper body bathing with mod assist.       Goals to be achieved in 7 days. OCCUPATIONAL THERAPY Daily Note and PM       OT Visit Days: 3  Acknowledge Orders  Time  OT Charge Capture  Rehab Caseload Tracker      Malou Burt is a 69 y.o. female   PRIMARY DIAGNOSIS: Bacteremia due to Gram-positive bacteria  Cellulitis [L03.90]  JOSE (acute kidney injury) ([facility] [address] [N17.9]  Cellulitis of right lower extremity [L03.115]  Sepsis, due to unspecified organism, unspecified whether acute organ dysfunction present ([facility] [address] [A41.9]  Bacteremia due to Gram-negative bacteria [R78.81]       Reason for Referral: Generalized Muscle Weakness (M62.81)  Other lack of cordination (R27.8)  Inpatient: Payor: MEDICARE / Plan: MEDICARE PART A AND B / Product Type: *No Product type* /     ASSESSMENT:     REHAB RECOMMENDATIONS:   Recommendation to date pending progress:  Setting:  Short-term Rehab    Equipment:    To Be Determined     ASSESSMENT:  Ms. Rebeka Lopez admitted with above diagnosis. Pt presents with decreased self care, functional mobility and strength. Pt would benefit from skilled OT to increase independence. Pt confused and agitated this am. Pt with saturated brief. Pt max assist x 2 to roll to left and right for stefani-care cleaning. Pt total assist to don/doff brief. Pt reports pain in her neck, back and head. Pt with spinal stimulator. Pt too agitated to try sitting edge of bed. Recommend STR at d/c.      10/9/22: Pt presents sitting up in recliner visiting with family in room. She is agreeable to OT and PT co-treatment session. She required additional time to work through all activities and presents as more alert today.  She was able to stand from recliner with min A x2 and take steps to bed with RW and cues for walker placement. She demonstrated improved sitting balance and tolerance EOB in prep for OOB ADLs. She declined toilet t/f or practice, but showed improved ability to perform sit>supine. Good progress today. 10/10/2022 pt supine in bed upon arrival fishing let exercises with PTA. Pt with foam neck brace brought in by family. Pt up to edge of bed, completed grooming, and transferred to recliner. Pt did not move as well today required much encouragement and moderate assistance for all mobility. Pt did not even attempt to comb her own hair. Pt set up with all needs and left up in recliner. Will continue plan of care.           SUBJECTIVE:     Ms. Boyd Sic states, she needs to bed walking     Social/Functional Lives With: Spouse  Type of Home: House  Home Layout: Multi-level, Bed/Bath upstairs  Home Access: Stairs to enter without rails  Entrance Stairs - Number of Steps: 3  Receives Help From: Family  ADL Assistance: Independent  Homemaking Assistance: Needs assistance  Homemaking Responsibilities: No  Ambulation Assistance: Independent  Active : No (has a license but doesn't drive)  Occupation: Retired    OBJECTIVE:     Tiffany Ellington / Karen El / Flora Cm: None    RESTRICTIONS/PRECAUTIONS:  Restrictions/Precautions: Fall Risk    PAIN: Horace Remak / O2:   Pre Treatment:          Post Treatment: reports pain in knee and neck during session but does not rate her pain       Vitals          Oxygen            MOBILITY: I Mod I S SBA CGA Min Mod Max Total  NT x2 Comments:   Bed Mobility    Rolling [] [] [] [] [] [] [] [] [] [] []    Supine to Sit [] [] [] [] [] [] [x] [] [] [] []    Scooting [] [] [] [] [] [] [x] [] [] [] []    Sit to Supine [] [] [] [] [] [] [] [] [] [] []    Transfers    Sit to Stand [] [] [] [] [] [] [x] [] [] [] [x] Additional time   Bed to Chair [] [] [] [] [] [] [x] [] [] [] [x] Rw, additional time   Stand to Sit [] [] [] [] [] [] [x] [] [] [] [x]    Tub/Shower [] [] [] [] [] [] [] [] [] [] []     Toilet [] [] [] [] [] [] [] [] [] [] []      [] [] [] [] [] [] [] [] [] [] []    I=Independent, Mod I=Modified Independent, S=Supervision/Setup, SBA=Standby Assistance, CGA=Contact Guard Assistance, Min=Minimal Assistance, Mod=Moderate Assistance, Max=Maximal Assistance, Total=Total Assistance, NT=Not Tested    ACTIVITIES OF DAILY LIVING: I Mod I S SBA CGA Min Mod Max Total NT Comments   BASIC ADLs:              Upper Body Bathing  [] [] [] [] [] [] [] [] [] []    Lower Body Bathing [] [] [] [] [] [] [] [] [] []    Toileting [] [] [] [] [] [] [] [] [] []    Upper Body Dressing [] [] [] [] [] [] [] [] [] []    Lower Body Dressing [] [] [] [] [] [] [] [] [] []    Feeding [] [] [] [] [] [] [] [] [] []    Grooming [] [] [] [] [] [] [] [x] [] [] Due to being fixated on pain    Personal Device Care [] [] [] [] [] [] [] [] [] []    Functional Mobility [] [] [] [] [] [] [x] [] [] [] X2  and additional time   I=Independent, Mod I=Modified Independent, S=Supervision/Setup, SBA=Standby Assistance, CGA=Contact Guard Assistance, Min=Minimal Assistance, Mod=Moderate Assistance, Max=Maximal Assistance, Total=Total Assistance, NT=Not Tested    PLAN:   FREQUENCY/DURATION   OT Plan of Care: 3 times/week for duration of hospital stay or until stated goals are met, whichever comes first.    PROBLEM LIST:   (Skilled intervention is medically necessary to address:)  Decreased ADL/Functional Activities  Decreased Activity Tolerance  Decreased AROM/PROM  Decreased Balance  Decreased Cognition  Decreased Strength  Decreased Transfer Abilities   INTERVENTIONS PLANNED:  (Benefits and precautions of occupational therapy have been discussed with the patient.)  Self Care Training  Therapeutic Activity  Therapeutic Exercise/HEP  Neuromuscular Re-education  Manual Therapy  Education         TREATMENT:     TREATMENT:   Co-Treatment PT/OT necessary due to patient's decreased overall endurance/tolerance levels, as well as need for high level skilled assistance to complete functional transfers/mobility and functional tasks   Self Care: (25 min): Procedure(s) (per grid) utilized to improve and/or restore self-care/home management as related to grooming and functional mobility . Required moderate verbal, manual, and   cueing to facilitate activities of daily living skills and compensatory activities. TREATMENT GRID:  N/A    AFTER TREATMENT PRECAUTIONS: Bed/Chair Locked, Call light within reach, Chair, Needs within reach, and RN notified    INTERDISCIPLINARY COLLABORATION:  RN/ PCT, PT/ PTA, and OT/ CASTILLO    EDUCATION:  Education Given To: Patient  Education Provided: Role of Therapy;Plan of Care;Transfer Training; Fall Prevention Strategies  Education Method: Verbal;Demonstration  Barriers to Learning: Cognition  Education Outcome: Continued education needed    TOTAL TREATMENT DURATION AND TIME:  Time In: 1110  Time Out: [facility]  Minutes: BLOSSOM Meier

## 2022-10-10 NOTE — PROGRESS NOTES
ACUTE PHYSICAL THERAPY GOALS:   (Developed with and agreed upon by patient and/or caregiver.)  Goals set based on prior level of function. STG:  (1.)Ms. Germania Kumar will move from supine to sit and sit to supine  with MINIMAL ASSIST within 5 treatment day(s). (2.)Ms. Germania Kumar will transfer from bed to chair and chair to bed with MINIMAL ASSIST using the least restrictive device within 5 treatment day(s). (3.)Ms. Germania Kumar will ambulate with MINIMAL ASSIST for 10 feet with the least restrictive device within 5 treatment day(s). LTG:  (1.)Ms. Germania Kumar will move from supine to sit and sit to supine  in bed with STAND BY ASSIST within 10 treatment day(s). (2.)Ms. Germania Kumar will transfer from bed to chair and chair to bed with STAND BY ASSIST using the least restrictive device within 10 treatment day(s). (3.)Ms. Germania Kumar will ambulate with STAND BY ASSIST for 50 feet with the least restrictive device within 10 treatment day(s). PHYSICAL THERAPY: Daily Note AM   (Link to Caseload Tracking: PT Visit Days : 6  Time In/Out PT Charge Capture  Rehab Caseload Tracker  Orders    Santana Cortez is a 69 y.o. female   PRIMARY DIAGNOSIS: Bacteremia due to Gram-positive bacteria  Cellulitis [L03.90]  JOSE (acute kidney injury) ([facility] [address] [N17.9]  Cellulitis of right lower extremity [L03.115]  Sepsis, due to unspecified organism, unspecified whether acute organ dysfunction present ([facility] [address] [A41.9]  Bacteremia due to Gram-negative bacteria [R78.81]       Inpatient: Payor: MEDICARE / Plan: MEDICARE PART A AND B / Product Type: *No Product type* /     ASSESSMENT:     REHAB RECOMMENDATIONS:   Recommendation to date pending progress:  Setting:  Short-term Rehab    Equipment:    To Be Determined     ASSESSMENT:  Ms. Germania Kumar with little tolerance for any activity. She remains easily agitated and confused. She needed lots of encouragement to work with therapy but then noted her brief was saturated.   Patient changed with maximum assist of 2 as she was very resistant to rolling L or R and hollered out \"no\" multiple times. Patient reports she has a spinal stimulator (turned on low) and head, neck, and back pain. She was too agitated after getting her cleaned up to attempt any further activity. Recommending STR at d/c as not sure if or how soon she'll get back to a reported baseline of independence with mobility. 10/10 supine upon arrival.  Rolling from L><R x 4 to get clean up with Mod A and verbal cues. Then performs B LE exercises with guidance and help. Work on bed mobility as follows:supine>eob with Mod A x 2 with verbal cues and therapist was helping with pad. Sat on eob without support, while OT work on her hair. Then sit>stand x 2 with Mod A x 2 with gait belt and verbal cues. Then took 6 step to the chair with Mod A x 2 with verbal cues for hand placement. Sat in the chair with needs in reach and instructed to call for assist, before getting up. Pt yelled the whole time, while  working  on bed mobility, transfers and sitting on the eob. Pt needs extra time to work with all activities. She need more assist today. SUBJECTIVE:   Ms. Henriquez Pa stated I am wet.     Social/Functional Lives With: Spouse  Type of Home: House  Home Layout: Multi-level, Bed/Bath upstairs  Home Access: Stairs to enter without rails  Entrance Stairs - Number of Steps: 3  Receives Help From: Family  ADL Assistance: Independent  Homemaking Assistance: Needs assistance  Homemaking Responsibilities: No  Ambulation Assistance: Independent  Active : No (has a license but doesn't drive)  Occupation: Retired  OBJECTIVE:     PAIN: VITALS / O2: PRECAUTION / Filomena Riddles / Willila Piper:   Pre Treatment:          Post Treatment: pain in the neck and back Vitals        Oxygen    None    RESTRICTIONS/PRECAUTIONS:  Restrictions/Precautions  Restrictions/Precautions: Fall Risk  Restrictions/Precautions: Fall Risk     MOBILITY: I Mod I S SBA CGA Min Mod Max Total  NT x2 Comments:   Bed Mobility Rolling [] [] [] [] [] [] [x] [] [] [] []    Supine to Sit [] [] [] [] [] [] [x] [] [] [] []    Scooting [] [] [] [] [] [] [x] [] [] [] []    Sit to Supine [] [] [] [] [] [] [] [] [] [] []    Transfers    Sit to Stand [] [] [] [] [] [] [x] [] [] [] [x]    Bed to Chair [] [] [] [] [] [] [x] [] [] [] [x]    Stand to Sit [] [] [] [] [] [] [x] [] [] [] [x]     [] [] [] [] [] [] [] [] [] [] []    I=Independent, Mod I=Modified Independent, S=Supervision, SBA=Standby Assistance, CGA=Contact Guard Assistance,   Min=Minimal Assistance, Mod=Moderate Assistance, Max=Maximal Assistance, Total=Total Assistance, NT=Not Tested    BALANCE: Good Fair+ Fair Fair- Poor NT Comments   Sitting Static [] [x] [] [] [] []    Sitting Dynamic [] [x] [] [] [] []              Standing Static [] [] [x] [] [] [] With RW   Standing Dynamic [] [] [x] [] [] [] With RW     GAIT: I Mod I S SBA CGA Min Mod Max Total  NT x2 Comments:   Level of Assistance [] [] [] [] [] [] [x] [] [] [] [x]    Distance   Feet 5 steps to chair    DME Rolling Walker with gait belt    Gait Quality Decreased michelle , Decreased step clearance, Decreased step length, Decreased stance, Shuffling , Step-to, and Trunk sway increased    Weightbearing Status      Stairs      I=Independent, Mod I=Modified Independent, S=Supervision, SBA=Standby Assistance, CGA=Contact Guard Assistance,   Min=Minimal Assistance, Mod=Moderate Assistance, Max=Maximal Assistance, Total=Total Assistance, NT=Not Tested    PLAN:   FREQUENCY AND DURATION: Daily for duration of hospital stay or until stated goals are met, whichever comes first.    TREATMENT:   TREATMENT:   Therapeutic Activity (45 Minutes): Therapeutic activity included Supine to Sit, Transfer Training, Ambulation on level ground, Sitting balance , and Standing balance to improve functional Activity tolerance, Balance, Coordination, Mobility, Strength, and ROM.     TREATMENT GRID:  PROM for B UE/LE Date:  10/7  Date:  10/8 Date:  10/9 Date 10/10   Activity/Exercise Parameters Parameters Parameters    Ankle pumps 12 15 15 15   Quad set  12 A 10 15 15   Heel slides 12 10aa 15aa 15    Hip ab/ad 12 10aa 15aa 15 aa   SAQ 12      Shoulder flex/ext 12      Shoulder ab/ad 12      Elbow flex/ext 12      SLR  10aa 15aa 15 aa         AFTER TREATMENT PRECAUTIONS: Bed, Bed/Chair Locked, Call light within reach, Needs within reach, and RN notified    INTERDISCIPLINARY COLLABORATION:  RN/ PCT, PT/ PTA, and OT/ CASTILLO    EDUCATION: Education Given To: Patient  Education Provided: Role of Therapy  Education Method: Demonstration;Verbal    TIME IN/OUT:  Time In: 1045  Time Out: 1 Leopoldo Meza  Minutes: 521 Isidro Meza PTA

## 2022-10-10 NOTE — PROGRESS NOTES
Hospitalist Progress Note   Admit Date:  10/4/2022  9:06 PM   Name:  Jasmine Smith   Age:  69 y.o. Sex:  female  :  1955   MRN:  [id number]   Room:  [facility]/    Presenting Complaint: Back pain and fatigue  Reason(s) for Admission: Cellulitis [L03.90]  JOSE (acute kidney injury) ([facility] [address] [N17.9]  Cellulitis of right lower extremity [L03.115]  Sepsis, due to unspecified organism, unspecified whether acute organ dysfunction present [facility]) [A41.9]  Bacteremia due to Gram-negative bacteria [facility] Course & Interval History:   69year old female with chronic pain status post neurostimulator placement on 22, hypertension, diabetes type 2, fibromyalgia presented to emergency room on 10/5/22 with chief complaint of generalized body ache, generalized weakness, poor appetite for past few days duration with dalia falls. Patient reported low-grade fever at home, history of right lower extremity redness involving just above ankle for past few days duration. Further work-up in emergency room shows evidence of elevated white count, procalcitonin is significantly elevated at 15. Patient was initiated on broad-spectrum antibiotics, chest x-ray did not show any evidence of infiltrates, urinalysis does not show evidence of UTI. Blood cultures grew group B strep so she was started on Ampicillin by ID. She was switched to Amoxicillin on 10/9/22. Subjective/24hr Events (10/10/22): She complains of 5/10 neck and back pain. Denies CP/SOB. Denies abdominal pain. Denies N/V/D. Denies F/C.       Assessment & Plan:     Principal Problem:    Bacteremia due to Gram-positive bacteria  - 10/4 Blood cultures with GBS  - 10/6 Repeat blood cultures with Beta Hemolytic strep, likely contaminant  - 10/8 Repeat blood cultures NGTD  - Likely due to RLE cellulitis  - Stopped Zosyn  - Stopped Vancomycin  - 10/8 Continue Ampicillin until blood cultures negative  - 10/9 Change Ampicillin to Amoxil 1g TID - EOT 10/14  - UA unremarkable  - Pain stimulator area not red or tender  - CXR clear  - 10/4 Procalcitonin 15  - 10/9 Repeat Procalcitonin pending  - ECHO normal  - 10/6 CT C/T spine w contrast with no fluid pockets  - 10/4 CTAP with fluid around spinal stimulator  - Appreciate ID's input and assistance    Active Problems:    Cellulitis of right lower extremity  - Unsure if true cellulitis  - Has well demarcated lines and patient/ states it's been there a while  - Had right second to wound - followed by podiatry as OP  - 10/8 Continue Ampicillin  - 10/9 Change Ampicillin to Amoxil 1g TID - EOT 10/14      HTN (hypertension)  - All BP meds held due to hypotension in ER  - BP now up  - Restart Coreg now  - Restart other BP meds as appropriate      Type 2 diabetes mellitus without complication (HCC)  - Stable  - Continue Humalog SSI      Chronic pain syndrome  - States that pain is a lot worse over past 4 weeks  - S/P spinal stimulator on 6/23/22  - Continue home Percocet 10  - Stop Dilaudid PRN      S/P insertion of spinal cord stimulator      Opioid dependence ([address]  - Be mindful of drowsiness and respiratory depression      Fibromyalgia      Generalized anxiety disorder      HEIDY (obstructive sleep apnea)      Class 1 Obesity    Diet:  ADULT DIET; Regular  DVT PPx: Lovenox  Code status: Full Code    Hospital Problems:  Principal Problem:    Bacteremia due to Gram-positive bacteria  Active Problems:    Cellulitis of right lower extremity    HTN (hypertension)    Paroxysmal atrial fibrillation (HCC)    Type 2 diabetes mellitus without complication (HCC)    Chronic pain syndrome    S/P insertion of spinal cord stimulator    Opioid dependence (HCC)    Fibromyalgia    Generalized anxiety disorder    HEIDY (obstructive sleep apnea)    Primary hypothyroidism    Obesity  Resolved Problems:    * No resolved hospital problems.  *      Discharge Plan:     Location: SNF  Days: 1-2  PPD Ordered: 10/5/22      Objective:   Patient Vitals for the past 24 hrs:   Temp Pulse Resp BP SpO2   10/10/22 0745 97.7 °F (36.5 °C) 60 16 (!) 146/63 96 %   10/10/22 0515 98.1 °F (36.7 °C) 59 18 (!) 147/69 95 %   10/10/22 0100 98.3 °F (36.8 °C) 56 18 (!) 145/108 --   10/09/22 1955 97.7 °F (36.5 °C) 52 17 (!) 124/58 96 %   10/09/22 1500 98.7 °F (37.1 °C) 90 12 126/68 96 %   10/09/22 1245 98.2 °F (36.8 °C) 96 16 (!) 84/56 96 %   10/09/22 1059 -- -- 12 -- --       Oxygen Therapy  SpO2: 96 %  Pulse Oximetry Type: Intermittent  Pulse via Oximetry: 94 beats per minute  Pulse Oximeter Device Mode: Intermittent  O2 Device: None (Room air)    Estimated body mass index is 32.11 kg/m² as calculated from the following:    Height as of this encounter: 5' 7\" (1.702 m). Weight as of this encounter: 205 lb (93 kg). No intake or output data in the 24 hours ending 10/10/22 1043        Physical Exam:   Blood pressure (!) 146/63, pulse 60, temperature 97.7 °F (36.5 °C), resp. rate 16, height 5' 7\" (1.702 m), weight 205 lb (93 kg), SpO2 96 %. General:    Well nourished. In moderate distress, moaning  Head:  Normocephalic, atraumatic  Eyes:  Sclerae appear normal.  Pupils equally round. ENT:  Nares appear normal, no drainage. Moist oral mucosa  Neck:  No restricted ROM. Trachea midline   CV:   RRR. No m/r/g. No jugular venous distension. Lungs:   CTAB. No wheezing, rhonchi, or rales. Respirations even, unlabored  Abdomen: Bowel sounds present. Soft, nontender, nondistended. Extremities: No cyanosis or clubbing. No edema  Skin:     RLE with well demarcated area of erythema. Right second toe with wound. Warm and dry. Neuro:  CN II-XII grossly intact. Sensation intact. A&Ox3  Psych:  Normal mood and affect.       I have reviewed ordered lab tests and independently visualized imaging below:    Recent Labs:  Recent Results (from the past 48 hour(s))   POCT Glucose    Collection Time: 10/08/22 11:24 AM   Result Value Ref Range    POC Glucose 246 (H) 65 - 100 mg/dL    Performed by: Waqas    POCT Glucose    Collection Time: 10/08/22  4:41 PM   Result Value Ref Range    POC Glucose 233 (H) 65 - 100 mg/dL    Performed by: Waqas    POCT Glucose    Collection Time: 10/08/22  8:39 PM   Result Value Ref Range    POC Glucose 296 (H) 65 - 100 mg/dL    Performed by: Nikki    CBC    Collection Time: 10/09/22  4:17 AM   Result Value Ref Range    WBC 13.4 (H) 4.3 - 11.1 K/uL    RBC 3.81 (L) 4.05 - 5.2 M/uL    Hemoglobin 11.4 (L) 11.7 - 15.4 g/dL    Hematocrit 34.4 (L) 35.8 - 46.3 %    MCV 90.3 79.6 - 97.8 FL    MCH 29.9 26.1 - 32.9 PG    MCHC 33.1 31.4 - 35.0 g/dL    RDW 13.5 11.9 - 14.6 %    Platelets 986 225 - 033 K/uL    MPV 10.2 9.4 - 12.3 FL    nRBC 0.00 0.0 - 0.2 K/uL   Basic Metabolic Panel w/ Reflex to MG    Collection Time: 10/09/22  4:17 AM   Result Value Ref Range    Sodium 135 (L) 136 - 145 mmol/L    Potassium 3.4 (L) 3.5 - 5.1 mmol/L    Chloride 101 101 - 110 mmol/L    CO2 25 21 - 32 mmol/L    Anion Gap 9 4 - 13 mmol/L    Glucose 256 (H) 65 - 100 mg/dL    BUN 13 8 - 23 MG/DL    Creatinine 0.68 0.6 - 1.0 MG/DL    Est, Glom Filt Rate >60 >60 ml/min/1.73m2    Calcium 8.7 8.3 - 10.4 MG/DL   Magnesium    Collection Time: 10/09/22  4:17 AM   Result Value Ref Range    Magnesium 2.0 1.8 - 2.4 mg/dL   POCT Glucose    Collection Time: 10/09/22  6:26 AM   Result Value Ref Range    POC Glucose 234 (H) 65 - 100 mg/dL    Performed by: Nikki    POCT Glucose    Collection Time: 10/09/22 11:38 AM   Result Value Ref Range    POC Glucose 264 (H) 65 - 100 mg/dL    Performed by: Daxa Valerio    POCT Glucose    Collection Time: 10/09/22  4:45 PM   Result Value Ref Range    POC Glucose 283 (H) 65 - 100 mg/dL    Performed by: Daxa Valerio    POCT Glucose    Collection Time: 10/10/22 12:13 AM   Result Value Ref Range    POC Glucose 248 (H) 65 - 100 mg/dL    Performed by: Tete    CBC    Collection Time: 10/10/22  6:29 AM   Result Value Ref Range WBC 16.3 (H) 4.3 - 11.1 K/uL    RBC 3.70 (L) 4.05 - 5.2 M/uL    Hemoglobin 11.1 (L) 11.7 - 15.4 g/dL    Hematocrit 34.1 (L) 35.8 - 46.3 %    MCV 92.2 79.6 - 97.8 FL    MCH 30.0 26.1 - 32.9 PG    MCHC 32.6 31.4 - 35.0 g/dL    RDW 13.9 11.9 - 14.6 %    Platelets 729 437 - 427 K/uL    MPV 10.3 9.4 - 12.3 FL    nRBC 0.00 0.0 - 0.2 K/uL   Basic Metabolic Panel w/ Reflex to MG    Collection Time: 10/10/22  6:29 AM   Result Value Ref Range    Sodium 135 (L) 136 - 145 mmol/L    Potassium 4.3 3.5 - 5.1 mmol/L    Chloride 104 101 - 110 mmol/L    CO2 25 21 - 32 mmol/L    Anion Gap 6 4 - 13 mmol/L    Glucose 242 (H) 65 - 100 mg/dL    BUN 13 8 - 23 MG/DL    Creatinine 0.67 0.6 - 1.0 MG/DL    Est, Glom Filt Rate >60 >60 ml/min/1.73m2    Calcium 8.8 8.3 - 10.4 MG/DL   POCT Glucose    Collection Time: 10/10/22  7:04 AM   Result Value Ref Range    POC Glucose 268 (H) 65 - 100 mg/dL    Performed by: Susan        Other Studies:  CT ABDOMEN PELVIS W IV CONTRAST Additional Contrast? None    Result Date: 10/5/2022  EXAM: CT ABDOMEN PELVIS W IV CONTRAST HISTORY: sepsis. TECHNIQUE: Axial images of the abdomen and pelvis were performed following the administration of intravenous contrast. Images were obtained in the axial plane and coronal reformatted images were created and reviewed. Dose reduction technique used: Automated exposure control/Adjustment of the mA and/or kV according to patient size/Use of iterative reconstruction technique. 100 mL of Isovue-370 were utilized. COMPARISON: CT chest dated 9/12/2009. FINDINGS: The visualized lung bases show mild dependent atelectasis. The visualized mediastinum appears unremarkable. The liver, spleen, adrenal glands, and kidneys are within normal limits. The bladder appears grossly normal. The pancreas appears atrophic. The gallbladder is dilated. It measures up to 6.1 cm transversely. It contains at least one radiopaque stone. Distal esophagus appears unremarkable.  The patient is status post gastric bypass. Small and large bowel show no evidence of obstruction. A structure felt to represent the appendix is seen in the right lower quadrant and appears grossly normal. No evidence of free fluid or free air. No pathologic adenopathy. No abdominal aortic aneurysm. Osseous structures show no evidence of acute fracture or suspicious lesion. There is a small fat-containing umbilical hernia. Spinal stimulator leads are in place at the level of the thoracic spine. There is a small fluid collection surrounding the subcutaneous portion. The gallbladder is dilated. It measures up to 6.1 cm transversely. It contains at least one radiopaque stone. There is no appreciable pericholecystic fluid or obvious wall thickening. Spinal stimulator leads are in place at the level of the thoracic spine. There is a small fluid collection surrounding the subcutaneous portion. The patient is status post gastric bypass. XR CHEST PORTABLE    Result Date: 10/4/2022  CHEST X-RAY, single portable view  10/4/2022 History: Fall. Technique: Single frontal view of the chest. Comparison: Chest x-ray 12/14/2010 Findings: Today's study is limited given that the patient is imaged obliqued to the right. The cardiac silhouette is not clearly enlarged given AP technique of this exam.  There is an asymmetric appearance of the left hilum which may be related to the patient's positioning. Similar prominence has been seen such as on a prior comparison dated 12/11/2010 therefore this is not highly suspicious. The lungs are expanded without evidence for pneumothorax. No consolidative airspace process or pleural effusion is seen. 1.  Limited study without significant acute changes evident.        Current Meds:  Current Facility-Administered Medications   Medication Dose Route Frequency    amoxicillin (AMOXIL) capsule 1,000 mg  1,000 mg Oral 3 times per day    tiZANidine (ZANAFLEX) tablet 4 mg  4 mg Oral Q6H PRN    celecoxib (CELEBREX) capsule 200 mg  200 mg Oral BID    potassium chloride (KLOR-CON M) extended release tablet 40 mEq  40 mEq Oral PRN    Or    potassium bicarb-citric acid (EFFER-K) effervescent tablet 40 mEq  40 mEq Oral PRN    Or    potassium chloride 10 mEq/100 mL IVPB (Peripheral Line)  10 mEq IntraVENous PRN    oxyCODONE-acetaminophen (PERCOCET)  MG per tablet 1 tablet  1 tablet Oral Q4H PRN    oxyCODONE-acetaminophen (PERCOCET)  MG per tablet 2 tablet  2 tablet Oral Q4H PRN    spironolactone (ALDACTONE) tablet 25 mg  25 mg Oral Daily    gabapentin (NEURONTIN) capsule 300 mg  300 mg Oral TID    DULoxetine (CYMBALTA) extended release capsule 60 mg  60 mg Oral Nightly    carvedilol (COREG) tablet 6.25 mg  6.25 mg Oral BID WC    levothyroxine (SYNTHROID) tablet 125 mcg  125 mcg Oral Daily    pantoprazole (PROTONIX) tablet 40 mg  40 mg Oral QAM AC    ezetimibe (ZETIA) tablet 10 mg  10 mg Oral Nightly    sodium chloride flush 0.9 % injection 5-40 mL  5-40 mL IntraVENous 2 times per day    sodium chloride flush 0.9 % injection 5-40 mL  5-40 mL IntraVENous PRN    0.9 % sodium chloride infusion   IntraVENous PRN    enoxaparin (LOVENOX) injection 40 mg  40 mg SubCUTAneous Daily    ondansetron (ZOFRAN-ODT) disintegrating tablet 4 mg  4 mg Oral Q8H PRN    Or    ondansetron (ZOFRAN) injection 4 mg  4 mg IntraVENous Q6H PRN    polyethylene glycol (GLYCOLAX) packet 17 g  17 g Oral Daily PRN    insulin lispro (HUMALOG) injection vial 0-8 Units  0-8 Units SubCUTAneous TID WC    insulin lispro (HUMALOG) injection vial 0-4 Units  0-4 Units SubCUTAneous Nightly    glucose chewable tablet 16 g  4 tablet Oral PRN    dextrose bolus 10% 125 mL  125 mL IntraVENous PRN    Or    dextrose bolus 10% 250 mL  250 mL IntraVENous PRN    glucagon (rDNA) injection 1 mg  1 mg SubCUTAneous PRN    dextrose 10 % infusion   IntraVENous Continuous PRN    aspirin chewable tablet 81 mg  81 mg Oral Nightly    traZODone (DESYREL) tablet 50 mg  50 mg Oral Nightly    topiramate (TOPAMAX) tablet 100 mg  100 mg Oral BID       Signed:  Olesya Fields DO  10/5/22

## 2022-10-10 NOTE — PROGRESS NOTES
ACUTE OCCUPATIONAL THERAPY GOALS:   (Developed with and agreed upon by patient and/or caregiver.)  . 1. Patient will perform grooming with min assist.   2. Patient will perform rolling mod assist to assist with stefani-care and adls. 3. Patient will perform upper body bathing with mod assist.       Goals to be achieved in 7 days. OCCUPATIONAL THERAPY Daily Note and PM       OT Visit Days: 3  Acknowledge Orders  Time  OT Charge Capture  Rehab Caseload Tracker      Harlen Simmonds is a 69 y.o. female   PRIMARY DIAGNOSIS: Bacteremia due to Gram-positive bacteria  Cellulitis [L03.90]  JOSE (acute kidney injury) ([facility] [address] [N17.9]  Cellulitis of right lower extremity [L03.115]  Sepsis, due to unspecified organism, unspecified whether acute organ dysfunction present ([facility] [address] [A41.9]  Bacteremia due to Gram-negative bacteria [R78.81]       Reason for Referral: Generalized Muscle Weakness (M62.81)  Other lack of cordination (R27.8)  Inpatient: Payor: MEDICARE / Plan: MEDICARE PART A AND B / Product Type: *No Product type* /     ASSESSMENT:     REHAB RECOMMENDATIONS:   Recommendation to date pending progress:  Setting:  Short-term Rehab    Equipment:    To Be Determined     ASSESSMENT:  Ms. Anjali Torres admitted with above diagnosis. Pt presents with decreased self care, functional mobility and strength. Pt would benefit from skilled OT to increase independence. Pt confused and agitated this am. Pt with saturated brief. Pt max assist x 2 to roll to left and right for stefani-care cleaning. Pt total assist to don/doff brief. Pt reports pain in her neck, back and head. Pt with spinal stimulator. Pt too agitated to try sitting edge of bed. Recommend STR at d/c.      10/9/22: Pt presents sitting up in recliner visiting with family in room. She is agreeable to OT and PT co-treatment session. She required additional time to work through all activities and presents as more alert today.  She was able to stand from recliner with min A x2 and take steps to bed with RW and cues for walker placement. She demonstrated improved sitting balance and tolerance EOB in prep for OOB ADLs. She declined toilet t/f or practice, but showed improved ability to perform sit>supine. Good progress today. 10/10/2022  AM pt supine in bed upon arrival fishing let exercises with PTA. Pt with foam neck brace brought in by family. Pt up to edge of bed, completed grooming, and transferred to recliner. Pt did not move as well today required much encouragement and moderate assistance for all mobility. Pt did not even attempt to comb her own hair. Pt set up with all needs and left up in recliner. Will continue plan of care. 10/10/2022 PM pt seen in room with PTA. Pt up in chair but with saturated brief and in need of a change. Every attempt made to engage pt in her own care. Pt up with RW and stood but unable/unwilling to to attempted self cleaning. Pt cleaned and new pull up donned. Pt assisted to bed and left with all needs.              SUBJECTIVE:     Ms. Sajan Cardoso states, she wanted to stay in chair but willing to move to get cleaned     Social/Functional Lives With: Spouse  Type of Home: House  Home Layout: Multi-level, Bed/Bath upstairs  Home Access: Stairs to enter without rails  Entrance Stairs - Number of Steps: 3  Receives Help From: Family  ADL Assistance: Independent  Homemaking Assistance: Needs assistance  Homemaking Responsibilities: No  Ambulation Assistance: Independent  Active : No (has a license but doesn't drive)  Occupation: Retired    OBJECTIVE:     Amelia Fields / Rosa Gomez / Chandler Alcala: None    RESTRICTIONS/PRECAUTIONS:  Restrictions/Precautions: Fall Risk    PAIN: [place] / O2:   Pre Treatment:          Post Treatment: reports pain in neck during session but does not rate her pain       Vitals          Oxygen            MOBILITY: I Mod I S SBA CGA Min Mod Max Total  NT x2 Comments:   Bed Mobility    Rolling [] [] [] [] [] [] [] [] [] [] []    Supine to Sit [] [] [] [] [] [] [] [] [] [] []    Scooting [] [] [] [] [] [] [x] [] [] [] []    Sit to Supine [] [] [] [] [] [] [x] [] [] [] []    Transfers    Sit to Stand [] [] [] [] [] [] [x] [] [] [] [x] Additional time   Bed to Chair [] [] [] [] [] [] [x] [] [] [] [x] Rw, additional time   Stand to Sit [] [] [] [] [] [] [x] [] [] [] [x]    Tub/Shower [] [] [] [] [] [] [] [] [] [] []     Toilet [] [] [] [] [] [] [] [] [] [] []      [] [] [] [] [] [] [] [] [] [] []    I=Independent, Mod I=Modified Independent, S=Supervision/Setup, SBA=Standby Assistance, CGA=Contact Guard Assistance, Min=Minimal Assistance, Mod=Moderate Assistance, Max=Maximal Assistance, Total=Total Assistance, NT=Not Tested    ACTIVITIES OF DAILY LIVING: I Mod I S SBA CGA Min Mod Max Total NT Comments   BASIC ADLs:              Upper Body Bathing  [] [] [] [] [] [] [] [] [] []    Lower Body Bathing [] [] [] [] [] [] [] [] [x] []    Toileting [] [] [] [] [] [] [] [] [x] []    Upper Body Dressing [] [] [] [] [] [] [] [] [] []    Lower Body Dressing [] [] [] [] [] [] [] [] [x] []    Feeding [] [] [] [] [] [] [] [] [] []    Grooming [] [] [] [] [] [] [] [] [] []    Personal Device Care [] [] [] [] [] [] [] [] [] []    Functional Mobility [] [] [] [] [] [] [x] [] [] [] X2  and additional time   I=Independent, Mod I=Modified Independent, S=Supervision/Setup, SBA=Standby Assistance, CGA=Contact Guard Assistance, Min=Minimal Assistance, Mod=Moderate Assistance, Max=Maximal Assistance, Total=Total Assistance, NT=Not Tested    PLAN:   FREQUENCY/DURATION   OT Plan of Care: 3 times/week for duration of hospital stay or until stated goals are met, whichever comes first.    PROBLEM LIST:   (Skilled intervention is medically necessary to address:)  Decreased ADL/Functional Activities  Decreased Activity Tolerance  Decreased AROM/PROM  Decreased Balance  Decreased Cognition  Decreased Strength  Decreased Transfer Abilities   INTERVENTIONS PLANNED:  (Benefits and precautions of occupational therapy have been discussed with the patient.)  Self Care Training  Therapeutic Activity  Therapeutic Exercise/HEP  Neuromuscular Re-education  Manual Therapy  Education         TREATMENT:     TREATMENT:   Co-Treatment PT/OT necessary due to patient's decreased overall endurance/tolerance levels, as well as need for high level skilled assistance to complete functional transfers/mobility and functional tasks   Self Care: (25 min): Procedure(s) (per grid) utilized to improve and/or restore self-care/home management as related to dressing, bathing, toileting, and functional mobility . Required moderate verbal, manual, and   cueing to facilitate activities of daily living skills and compensatory activities. TREATMENT GRID:  N/A    AFTER TREATMENT PRECAUTIONS: Bed, Bed/Chair Locked, Call light within reach, Needs within reach, and RN notified    INTERDISCIPLINARY COLLABORATION:  RN/ PCT, PT/ PTA, and OT/ CASTILLO    EDUCATION:  Education Given To: Patient  Education Provided: Role of Therapy;Plan of Care;Transfer Training; Fall Prevention Strategies  Education Method: Verbal;Demonstration  Barriers to Learning: Cognition  Education Outcome: Continued education needed    TOTAL TREATMENT DURATION AND TIME:  Time In: 26  Time Out: [address]  Minutes: BLOSSOM Meier

## 2022-10-10 NOTE — PROGRESS NOTES
Patient in bed. Alert and oriented x4. Lungs clear to auscultation. Active bowel sounds to all 4 quadrants, has not had a BM for several days. Patient to speak to doctor about stool softeners when he rounds. Patient denies needs at present. Neurovascular status WDL(normal lower extremity numbness). Palpable pulses. Positive dorsiflexion and plantar flexion. Instructed not to get up by themselves and call for assistance or any needs. Patient verbalized understanding. Call bell within reach. Side rails up x3. Bed low and locked. No distress noted.

## 2022-10-10 NOTE — PROGRESS NOTES
ACUTE PHYSICAL THERAPY GOALS:   (Developed with and agreed upon by patient and/or caregiver.)  Goals set based on prior level of function. STG:  (1.)Ms. Jody Domínguez will move from supine to sit and sit to supine  with MINIMAL ASSIST within 5 treatment day(s). (2.)Ms. Jody Domínguez will transfer from bed to chair and chair to bed with MINIMAL ASSIST using the least restrictive device within 5 treatment day(s). (3.)Ms. Jody Domínguez will ambulate with MINIMAL ASSIST for 10 feet with the least restrictive device within 5 treatment day(s). LTG:  (1.)Ms. Jody Domínguez will move from supine to sit and sit to supine  in bed with STAND BY ASSIST within 10 treatment day(s). (2.)Ms. Jody Domínguez will transfer from bed to chair and chair to bed with STAND BY ASSIST using the least restrictive device within 10 treatment day(s). (3.)Ms. Jody Domínguez will ambulate with STAND BY ASSIST for 50 feet with the least restrictive device within 10 treatment day(s). PHYSICAL THERAPY: Daily Note PM   (Link to Caseload Tracking: PT Visit Days : 6  Time In/Out PT Charge Capture  Rehab Caseload Tracker  Orders    Jamal Tolliver is a 69 y.o. female   PRIMARY DIAGNOSIS: Bacteremia due to Gram-positive bacteria  Cellulitis [L03.90]  JOSE (acute kidney injury) ([facility] [address] [N17.9]  Cellulitis of right lower extremity [L03.115]  Sepsis, due to unspecified organism, unspecified whether acute organ dysfunction present ([facility] [address] [A41.9]  Bacteremia due to Gram-negative bacteria [R78.81]       Inpatient: Payor: MEDICARE / Plan: MEDICARE PART A AND B / Product Type: *No Product type* /     ASSESSMENT:     REHAB RECOMMENDATIONS:   Recommendation to date pending progress:  Setting:  Short-term Rehab    Equipment:    To Be Determined     ASSESSMENT:  Ms. Jody Domínguez with little tolerance for any activity. She remains easily agitated and confused. She needed lots of encouragement to work with therapy but then noted her brief was saturated.   Patient changed with maximum assist of 2 as she was very resistant to rolling L or R and hollered out \"no\" multiple times. Patient reports she has a spinal stimulator (turned on low) and head, neck, and back pain. She was too agitated after getting her cleaned up to attempt any further activity. Recommending STR at d/c as not sure if or how soon she'll get back to a reported baseline of independence with mobility. 10/10 supine upon arrival.  Rolling from L><R x 4 to get clean up with Mod A and verbal cues. Then performs B LE exercises with guidance and help. Work on bed mobility as follows:supine>eob with Mod A x 2 with verbal cues and therapist was helping with pad. Sat on eob without support, while OT work on her hair. Then sit>stand x 2 with Mod A x 2 with gait belt and verbal cues. Then took 6 step to the chair with Mod A x 2 with verbal cues for hand placement. Sat in the chair with needs in reach and instructed to call for assist, before getting up. Pt yelled the whole time, while  working  on bed mobility, transfers and sitting on the eob. Pt needs extra time to work with all activities. She need more assist today. 10/10 pm sitting in the chair upon arrival.    Work on sit><stand x 3 with walker in front,Min A  to get clean up and new brief put on with  verbal cues to stand tall. Then took 6 step back to the bed using RW with Mod A x 2. Return to supine with Mod A x 2 with PT getting lower part of the body  and OT getting the  upper part of the body. remain in the bed with needs in reach, alarm on and instructed to call for assist, before getting up. SUBJECTIVE:   Ms. Lezlie Severs stated I am wet.     Social/Functional Lives With: Spouse  Type of Home: House  Home Layout: Multi-level, Bed/Bath upstairs  Home Access: Stairs to enter without rails  Entrance Stairs - Number of Steps: 3  Receives Help From: Family  ADL Assistance: Independent  Homemaking Assistance: Needs assistance  Homemaking Responsibilities: No  Ambulation Assistance: Independent  Active : No (has a license but doesn't drive)  Occupation: Retired  OBJECTIVE:     PAIN: VITALS / O2: PRECAUTION / Vazquez Hams / Ana Edis:   Pre Treatment:          Post Treatment: pain in the neck and back Vitals        Oxygen    None    RESTRICTIONS/PRECAUTIONS:  Restrictions/Precautions  Restrictions/Precautions: Fall Risk  Restrictions/Precautions: Fall Risk     MOBILITY: I Mod I S SBA CGA Min Mod Max Total  NT x2 Comments:   Bed Mobility    Rolling [] [] [] [] [] [] [x] [] [] [] []    Supine to Sit [] [] [] [] [] [] [x] [] [] [] []    Scooting [] [] [] [] [] [] [x] [] [] [] []    Sit to Supine [] [] [] [] [] [] [] [] [] [] []    Transfers    Sit to Stand [] [] [] [] [] [x] [] [] [] [] [x]    Bed to Chair [] [] [] [] [] [x] [] [] [] [] [x]    Stand to Sit [] [] [] [] [] [x] [] [] [] [] [x]     [] [] [] [] [] [] [] [] [] [] []    I=Independent, Mod I=Modified Independent, S=Supervision, SBA=Standby Assistance, CGA=Contact Guard Assistance,   Min=Minimal Assistance, Mod=Moderate Assistance, Max=Maximal Assistance, Total=Total Assistance, NT=Not Tested    BALANCE: Good Fair+ Fair Fair- Poor NT Comments   Sitting Static [] [x] [] [] [] []    Sitting Dynamic [] [x] [] [] [] []              Standing Static [] [] [x] [] [] [] With RW   Standing Dynamic [] [] [x] [] [] [] With RW     GAIT: I Mod I S SBA CGA Min Mod Max Total  NT x2 Comments:   Level of Assistance [] [] [] [] [] [] [x] [] [] [] [x]    Distance   Feet 6 steps to chair    DME Rolling Walker with gait belt    Gait Quality Decreased michelle , Decreased step clearance, Decreased step length, Decreased stance, Shuffling , Step-to, and Trunk sway increased    Weightbearing Status      Stairs      I=Independent, Mod I=Modified Independent, S=Supervision, SBA=Standby Assistance, CGA=Contact Guard Assistance,   Min=Minimal Assistance, Mod=Moderate Assistance, Max=Maximal Assistance, Total=Total Assistance, NT=Not Tested    PLAN:   FREQUENCY AND DURATION: Daily for duration of hospital stay or until stated goals are met, whichever comes first.    TREATMENT:   TREATMENT:   Therapeutic Activity (30 Minutes): Therapeutic activity included Sit to Supine, Transfer Training, Ambulation on level ground, Sitting balance , and Standing balance to improve functional Activity tolerance, Balance, Coordination, Mobility, Strength, and ROM.     TREATMENT GRID:  PROM for B UE/LE Date:  10/7  Date:  10/8 Date:  10/9 Date   10/10   Activity/Exercise Parameters Parameters Parameters    Ankle pumps 12 15 15 15   Quad set  12 A 10 15 15   Heel slides 12 10aa 15aa 15    Hip ab/ad 12 10aa 15aa 15 aa   SAQ 12      Shoulder flex/ext 12      Shoulder ab/ad 12      Elbow flex/ext 12      SLR  10aa 15aa 15 aa         AFTER TREATMENT PRECAUTIONS: Bed, Bed/Chair Locked, Call light within reach, Needs within reach, and RN notified    INTERDISCIPLINARY COLLABORATION:  RN/ PCT, PT/ PTA, and OT/ CASTILLO    EDUCATION: Education Given To: Patient  Education Provided: Role of Therapy  Education Method: Demonstration;Verbal    TIME IN/OUT:  Time In: 1415  Time Out: [address]  Minutes: [address], PTA

## 2022-10-11 VITALS
WEIGHT: 205 LBS | TEMPERATURE: 97.7 F | DIASTOLIC BLOOD PRESSURE: 61 MMHG | BODY MASS INDEX: 32.18 KG/M2 | OXYGEN SATURATION: 93 % | RESPIRATION RATE: 16 BRPM | HEART RATE: 53 BPM | SYSTOLIC BLOOD PRESSURE: 135 MMHG | HEIGHT: 67 IN

## 2022-10-11 LAB
ANION GAP SERPL CALC-SCNC: 7 MMOL/L (ref 2–11)
BACTERIA SPEC CULT: NORMAL
BUN SERPL-MCNC: 12 MG/DL (ref 8–23)
CALCIUM SERPL-MCNC: 8.8 MG/DL (ref 8.3–10.4)
CHLORIDE SERPL-SCNC: 103 MMOL/L (ref 101–110)
CO2 SERPL-SCNC: 25 MMOL/L (ref 21–32)
CREAT SERPL-MCNC: 0.62 MG/DL (ref 0.6–1)
ERYTHROCYTE [DISTWIDTH] IN BLOOD BY AUTOMATED COUNT: 14 % (ref 11.9–14.6)
GLUCOSE BLD STRIP.AUTO-MCNC: 180 MG/DL (ref 65–100)
GLUCOSE BLD STRIP.AUTO-MCNC: 277 MG/DL (ref 65–100)
GLUCOSE SERPL-MCNC: 203 MG/DL (ref 65–100)
HCT VFR BLD AUTO: 35.1 % (ref 35.8–46.3)
HGB BLD-MCNC: 11.3 G/DL (ref 11.7–15.4)
MCH RBC QN AUTO: 29.4 PG (ref 26.1–32.9)
MCHC RBC AUTO-ENTMCNC: 32.2 G/DL (ref 31.4–35)
MCV RBC AUTO: 91.4 FL (ref 82–102)
NRBC # BLD: 0 K/UL (ref 0–0.2)
PLATELET # BLD AUTO: 354 K/UL (ref 150–450)
PMV BLD AUTO: 9.7 FL (ref 9.4–12.3)
POTASSIUM SERPL-SCNC: 4.1 MMOL/L (ref 3.5–5.1)
RBC # BLD AUTO: 3.84 M/UL (ref 4.05–5.2)
SERVICE CMNT-IMP: ABNORMAL
SERVICE CMNT-IMP: ABNORMAL
SERVICE CMNT-IMP: NORMAL
SODIUM SERPL-SCNC: 135 MMOL/L (ref 133–143)
WBC # BLD AUTO: 14.1 K/UL (ref 4.3–11.1)

## 2022-10-11 PROCEDURE — 82962 GLUCOSE BLOOD TEST: CPT

## 2022-10-11 PROCEDURE — 6360000002 HC RX W HCPCS: Performed by: INTERNAL MEDICINE

## 2022-10-11 PROCEDURE — 80048 BASIC METABOLIC PNL TOTAL CA: CPT

## 2022-10-11 PROCEDURE — 2580000003 HC RX 258: Performed by: HOSPITALIST

## 2022-10-11 PROCEDURE — 6370000000 HC RX 637 (ALT 250 FOR IP): Performed by: HOSPITALIST

## 2022-10-11 PROCEDURE — 6370000000 HC RX 637 (ALT 250 FOR IP): Performed by: FAMILY MEDICINE

## 2022-10-11 PROCEDURE — 36415 COLL VENOUS BLD VENIPUNCTURE: CPT

## 2022-10-11 PROCEDURE — 6370000000 HC RX 637 (ALT 250 FOR IP): Performed by: INTERNAL MEDICINE

## 2022-10-11 PROCEDURE — 6360000002 HC RX W HCPCS: Performed by: HOSPITALIST

## 2022-10-11 PROCEDURE — 85027 COMPLETE CBC AUTOMATED: CPT

## 2022-10-11 RX ORDER — HYDROMORPHONE HYDROCHLORIDE 1 MG/ML
0.5 INJECTION, SOLUTION INTRAMUSCULAR; INTRAVENOUS; SUBCUTANEOUS ONCE
Status: COMPLETED | OUTPATIENT
Start: 2022-10-11 | End: 2022-10-11

## 2022-10-11 RX ORDER — AMOXICILLIN 500 MG/1
1000 CAPSULE ORAL EVERY 8 HOURS SCHEDULED
Qty: 58 CAPSULE | Refills: 0
Start: 2022-10-11 | End: 2022-10-21

## 2022-10-11 RX ORDER — TRAZODONE HYDROCHLORIDE 50 MG/1
50 TABLET ORAL NIGHTLY
Qty: 3 TABLET | Refills: 0 | Status: SHIPPED | OUTPATIENT
Start: 2022-10-11

## 2022-10-11 RX ORDER — TIZANIDINE 4 MG/1
4 TABLET ORAL EVERY 6 HOURS PRN
Qty: 12 TABLET | Refills: 0 | Status: SHIPPED | OUTPATIENT
Start: 2022-10-11

## 2022-10-11 RX ORDER — OXYCODONE AND ACETAMINOPHEN 10; 325 MG/1; MG/1
1 TABLET ORAL EVERY 4 HOURS PRN
Qty: 15 TABLET | Refills: 0 | Status: SHIPPED | OUTPATIENT
Start: 2022-10-11 | End: 2022-10-14

## 2022-10-11 RX ADMIN — OXYCODONE AND ACETAMINOPHEN 2 TABLET: 325; 10 TABLET ORAL at 09:12

## 2022-10-11 RX ADMIN — AMOXICILLIN 1000 MG: 500 CAPSULE ORAL at 06:30

## 2022-10-11 RX ADMIN — HYDROMORPHONE HYDROCHLORIDE 0.5 MG: 1 INJECTION, SOLUTION INTRAMUSCULAR; INTRAVENOUS; SUBCUTANEOUS at 11:53

## 2022-10-11 RX ADMIN — OXYCODONE AND ACETAMINOPHEN 1 TABLET: 325; 10 TABLET ORAL at 13:44

## 2022-10-11 RX ADMIN — GABAPENTIN 300 MG: 300 CAPSULE ORAL at 13:45

## 2022-10-11 RX ADMIN — TIZANIDINE 4 MG: 2 TABLET ORAL at 07:25

## 2022-10-11 RX ADMIN — GABAPENTIN 300 MG: 300 CAPSULE ORAL at 09:11

## 2022-10-11 RX ADMIN — LEVOTHYROXINE SODIUM 125 MCG: 0.12 TABLET ORAL at 06:37

## 2022-10-11 RX ADMIN — ENOXAPARIN SODIUM 40 MG: 100 INJECTION SUBCUTANEOUS at 09:12

## 2022-10-11 RX ADMIN — OXYCODONE AND ACETAMINOPHEN 2 TABLET: 325; 10 TABLET ORAL at 02:41

## 2022-10-11 RX ADMIN — PANTOPRAZOLE SODIUM 40 MG: 40 TABLET, DELAYED RELEASE ORAL at 06:37

## 2022-10-11 RX ADMIN — CARVEDILOL 6.25 MG: 6.25 TABLET, FILM COATED ORAL at 09:12

## 2022-10-11 RX ADMIN — CELECOXIB 200 MG: 200 CAPSULE ORAL at 09:11

## 2022-10-11 RX ADMIN — INSULIN LISPRO 4 UNITS: 100 INJECTION, SOLUTION INTRAVENOUS; SUBCUTANEOUS at 11:53

## 2022-10-11 RX ADMIN — TOPIRAMATE 100 MG: 100 TABLET, FILM COATED ORAL at 09:11

## 2022-10-11 RX ADMIN — SPIRONOLACTONE 25 MG: 25 TABLET ORAL at 09:11

## 2022-10-11 RX ADMIN — AMOXICILLIN 1000 MG: 500 CAPSULE ORAL at 13:45

## 2022-10-11 RX ADMIN — SODIUM CHLORIDE, PRESERVATIVE FREE 10 ML: 5 INJECTION INTRAVENOUS at 09:14

## 2022-10-11 ASSESSMENT — PAIN DESCRIPTION - DESCRIPTORS
DESCRIPTORS: THROBBING
DESCRIPTORS: ACHING
DESCRIPTORS: ACHING

## 2022-10-11 ASSESSMENT — PAIN SCALES - GENERAL
PAINLEVEL_OUTOF10: 5
PAINLEVEL_OUTOF10: 6
PAINLEVEL_OUTOF10: 10
PAINLEVEL_OUTOF10: 10
PAINLEVEL_OUTOF10: 8
PAINLEVEL_OUTOF10: 0

## 2022-10-11 ASSESSMENT — PAIN DESCRIPTION - LOCATION
LOCATION: BACK;NECK
LOCATION: BACK;LEG

## 2022-10-11 ASSESSMENT — PAIN - FUNCTIONAL ASSESSMENT: PAIN_FUNCTIONAL_ASSESSMENT: PREVENTS OR INTERFERES SOME ACTIVE ACTIVITIES AND ADLS

## 2022-10-11 NOTE — DISCHARGE SUMMARY
Hospitalist Discharge Summary   Admit Date:  10/4/2022  9:06 PM   DC Note date: 10/11/2022  Name:  Sakshi Fiore   Age:  69 y.o. Sex:  female  :  1955   MRN:  [id number]   Room:  [facility]  PCP:  Darrell Caldera RN    Presenting Complaint: Fall     Initial Admission Diagnosis: Cellulitis [L03.90]  JOSE (acute kidney injury) ([facility] [address] [N17.9]  Cellulitis of right lower extremity [L03.115]  Sepsis, due to unspecified organism, unspecified whether acute organ dysfunction present [facility]) [A41.9]  Bacteremia due to Gram-negative bacteria [R78.81]     Problem List for this Hospitalization (present on admission):    Principal Problem:    Bacteremia due to Gram-positive bacteria  Active Problems:    Cellulitis of right lower extremity    HTN (hypertension)    Paroxysmal atrial fibrillation (HCC)    Type 2 diabetes mellitus without complication (HCC)    Chronic pain syndrome    S/P insertion of spinal cord stimulator    Opioid dependence ([facility] [address]    Fibromyalgia    Generalized anxiety disorder    HEIDY (obstructive sleep apnea)    Primary hypothyroidism    Obesity  Resolved Problems:    * No resolved hospital problems. [facility] Course:  69year old female with chronic pain status post neurostimulator placement on 22, hypertension, diabetes type 2, fibromyalgia presented to emergency room on 10/5/22 with chief complaint of generalized body ache, generalized weakness, poor appetite for past few days duration with dalia falls. Patient reported low-grade fever at home, history of right lower extremity redness involving just above ankle for past few days duration. Further work-up in emergency room shows evidence of elevated white count, procalcitonin is significantly elevated at 15. Patient was initiated on broad-spectrum antibiotics, chest x-ray did not show any evidence of infiltrates, urinalysis does not show evidence of UTI. Blood cultures grew group B strep so she was started on Ampicillin by ID.  She was switched to Amoxicillin on 10/9/22. She will completed a course of Amoicillin 1g TID EOT 10/20/22. Of note, the patient has an opioid dependent/addiction history. She did show those tendencies while admitted to the hospital, asking for Dilaudid and Oxycodone specifically. Disposition: SNF  Diet: ADULT DIET; Regular  Code Status: Full Code    Follow Ups:   Contact information for after-discharge care     Discharge Destination     San[facility]) . Service: Skilled Nursing  Contact information:  [address] [facility] [id number]  [phone]                           Time spent in patient discharge and coordination 38 minutes. Follow up labs/diagnostics (ultimately defer to outpatient provider):  None    Plan was discussed with patient, daughter, nursing, and case management. All questions answered. Patient was stable at time of discharge. Instructions given to call a physician or return if any concerns. Current Discharge Medication List        START taking these medications    Details   amoxicillin (AMOXIL) 500 MG capsule Take 2 capsules by mouth every 8 hours for 29 doses  Qty: 58 capsule, Refills: 0           CONTINUE these medications which have CHANGED    Details   oxyCODONE-acetaminophen (PERCOCET)  MG per tablet Take 1 tablet by mouth every 4 hours as needed for Pain for up to 3 days.   Qty: 15 tablet, Refills: 0    Comments: Reduce doses taken as pain becomes manageable  Associated Diagnoses: Chronic pain syndrome; Chronic bilateral low back pain without sciatica      traZODone (DESYREL) 50 MG tablet Take 1 tablet by mouth nightly  Qty: 3 tablet, Refills: 0      tiZANidine (ZANAFLEX) 4 MG tablet Take 1 tablet by mouth every 6 hours as needed (Back spasms)  Qty: 12 tablet, Refills: 0           CONTINUE these medications which have NOT CHANGED    Details   SUMAtriptan (IMITREX) 100 MG tablet sumatriptan 100 mg tablet      ondansetron (ZOFRAN-ODT) 4 MG disintegrating tablet       spironolactone (ALDACTONE) 25 MG tablet spironolactone 25 mg tablet      pitavastatin (LIVALO) 2 MG TABS tablet Take 2 mg by mouth daily      !! carvedilol (COREG) 6.25 MG tablet Take 6.25 mg by mouth as needed Per pt depends on HR      gabapentin (NEURONTIN) 300 MG capsule Take 300 mg by mouth 3 times daily. !! DULoxetine (CYMBALTA) 60 MG extended release capsule Take 60 mg by mouth daily      ascorbic acid (VITAMIN C) 250 MG tablet Take by mouth daily      aspirin 81 MG chewable tablet Take 81 mg by mouth at bedtime Preventative Only-pt denies heart attack, stents, blood clots. Per pt possibly had a TIA in the past      !! carvedilol (COREG) 12.5 MG tablet Take 12.5 mg by mouth as needed Per pt depends on HR      celecoxib (CELEBREX) 200 MG capsule Take 200 mg by mouth 2 times daily      chlorthalidone (HYGROTON) 25 MG tablet Take 25 mg by mouth as needed       vitamin D3 (CHOLECALCIFEROL) 125 MCG (5000 UT) TABS tablet Take by mouth daily      coenzyme Q10 200 MG CAPS capsule Take 200 mg by mouth daily       !!  DULoxetine (CYMBALTA) 30 MG extended release capsule Take 30 mg by mouth at bedtime       estradiol (ESTRACE) 0.5 MG tablet Take 0.5 mg by mouth daily       ezetimibe (ZETIA) 10 MG tablet Take 10 mg by mouth daily      ferrous sulfate (IRON 325) 325 (65 Fe) MG tablet Take 325 mg by mouth Per pt takes 3-4 times a week      hydrALAZINE (APRESOLINE) 25 MG tablet Take 25 mg by mouth every 6 hours       insulin NPH (HUMULIN N;NOVOLIN N) 100 UNIT/ML injection vial Inject 10 Units into the skin every morning       insulin regular (HUMULIN R;NOVOLIN R) 100 UNIT/ML injection Inject into the skin Sliding scale      ketoconazole (NIZORAL) 2 % cream Apply topically as needed       levothyroxine (SYNTHROID) 125 MCG tablet Take by mouth every morning (before breakfast)      Lidocaine HCl 2.75 % LOTN Apply topically as needed       lisinopril (PRINIVIL;ZESTRIL) 40 MG tablet Take 40 mg by mouth at bedtime       loratadine (CLARITIN) 10 MG tablet Strength: 10 mg; Form: tablet; SIG: take 1 tab(s) orally once a day      magnesium oxide (MAG-OX) 400 MG tablet Take 500 mg by mouth 2 times daily      omeprazole (PRILOSEC) 20 MG delayed release capsule Take 20 mg by mouth 2 times daily      ondansetron (ZOFRAN) 4 MG tablet Take 4 mg by mouth every 8 hours as needed      topiramate (TOPAMAX) 100 MG tablet Take by mouth 2 times daily       ! ! - Potential duplicate medications found. Please discuss with provider. Procedures done this admission:  * No surgery found *    Consults this admission:  IP CONSULT TO PHARMACY  IP CONSULT TO INFECTIOUS DISEASES  IP CONSULT TO PHARMACY    Echocardiogram results:  10/04/22    TRANSTHORACIC ECHOCARDIOGRAM (TTE) COMPLETE (CONTRAST/BUBBLE/3D PRN) 10/06/2022 12:33 PM (Final)    Interpretation Summary    Left Ventricle: Normal left ventricular systolic function with a visually estimated EF of 55 - 60%. Left ventricle size is normal. Normal wall thickness. Normal wall motion. Abnormal diastolic function. Mitral Valve: Mild regurgitation. Left Atrium: Left atrium is moderately dilated. Technical qualifiers: Color flow Doppler was performed and pulse wave and/or continuous wave Doppler was performed. Contrast used: Definity. Signed by: Martell Duane, MD on 10/6/2022 12:33 PM      Diagnostic Imaging/Tests:   CT SOFT TISSUE NECK W CONTRAST    Result Date: 10/6/2022  CT NECK: 1. No acute process in the neck. 2. Multilevel cervical spondylosis with degenerative grade 1 anterolisthesis of C3 on C4. No acute cervical spine abnormality. CT CHEST: 1. Interval resolution of the fluid collection along the spinal cord stimulator leads. 2. Hypoventilatory exam with bronchovascular crowding and perihilar/dependent subsegmental atelectasis. No consolidation to suggest pneumonia. 3. Cardiomegaly with atherosclerosis including coronary artery involvement.      CT CHEST W CONTRAST    Result Date: 10/6/2022  CT NECK: 1. No acute process in the neck. 2. Multilevel cervical spondylosis with degenerative grade 1 anterolisthesis of C3 on C4. No acute cervical spine abnormality. CT CHEST: 1. Interval resolution of the fluid collection along the spinal cord stimulator leads. 2. Hypoventilatory exam with bronchovascular crowding and perihilar/dependent subsegmental atelectasis. No consolidation to suggest pneumonia. 3. Cardiomegaly with atherosclerosis including coronary artery involvement. CT ABDOMEN PELVIS W IV CONTRAST Additional Contrast? None    Result Date: 10/5/2022  The gallbladder is dilated. It measures up to 6.1 cm transversely. It contains at least one radiopaque stone. There is no appreciable pericholecystic fluid or obvious wall thickening. Spinal stimulator leads are in place at the level of the thoracic spine. There is a small fluid collection surrounding the subcutaneous portion. The patient is status post gastric bypass. XR CHEST PORTABLE    Result Date: 10/4/2022  1. Limited study without significant acute changes evident. Labs: Results:       BMP, Mg, Phos Recent Labs     10/09/22  0417 10/10/22  0629 10/11/22  0614   * 135* 135   K 3.4* 4.3 4.1    104 103   CO2 25 25 25   ANIONGAP 9 6 7   BUN 13 13 12   CREATININE 0.68 0.67 0.62   LABGLOM >60 >60 >60   CALCIUM 8.7 8.8 8.8   GLUCOSE 256* 242* 203*   MG 2.0  --   --       CBC Recent Labs     10/09/22  0417 10/10/22  0629 10/11/22  0614   WBC 13.4* 16.3* 14.1*   RBC 3.81* 3.70* 3.84*   HGB 11.4* 11.1* 11.3*   HCT 34.4* 34.1* 35.1*   MCV 90.3 92.2 91.4   MCH 29.9 30.0 29.4   MCHC 33.1 32.6 32.2   RDW 13.5 13.9 14.0    265 354   MPV 10.2 10.3 9.7   NRBC 0.00 0.00 0.00      LFT No results for input(s): BILITOT, BILIDIR, ALKPHOS, AST, ALT, PROT, LABALBU, GLOB in the last 72 hours.    Cardiac  No results found for: NTPROBNP, TROPHS   Coags No results found for: PROTIME, INR, APTT   A1c Lab Results   Component Value Date/Time    LABA1C 6.8 06/16/2022 12:32 PM     06/16/2022 12:32 PM      Lipids No results found for: CHOL, LDLCALC, LABVLDL, HDL, CHOLHDLRATIO, TRIG   Thyroid  No results found for: Winnie Barnes     Most Recent UA Lab Results   Component Value Date/Time    COLORU YELLOW/STRAW 10/04/2022 10:12 PM    APPEARANCE CLOUDY 10/04/2022 10:12 PM    SPECGRAV 1.016 10/04/2022 10:12 PM    LABPH 5.5 10/04/2022 10:12 PM    PROTEINU 30 10/04/2022 10:12 PM    GLUCOSEU Negative 10/04/2022 10:12 PM    KETUA 15 10/04/2022 10:12 PM    BILIRUBINUR Negative 10/04/2022 10:12 PM    BLOODU MODERATE 10/04/2022 10:12 PM    UROBILINOGEN 1.0 10/04/2022 10:12 PM    NITRU Negative 10/04/2022 10:12 PM    LEUKOCYTESUR TRACE 10/04/2022 10:12 PM    WBCUA 0-3 10/04/2022 10:12 PM    RBCUA 0-3 10/04/2022 10:12 PM    EPITHUA 3-5 10/04/2022 10:12 PM    BACTERIA TRACE 10/04/2022 10:12 PM    LABCAST 3-5 06/16/2022 12:23 PM    MUCUS 1+ 10/04/2022 10:12 PM        Recent Labs     10/08/22  0501 10/08/22  0455 10/06/22  0446 10/06/22  0440 10/04/22  2212   CULTURE NO GROWTH 3 DAYS NO GROWTH 3 DAYS STREPTOCOCCI, BETA HEMOLYTIC*  For identification and susceptibility refer to culture ACC [id number] NO GROWTH 5 DAYS >100,000 COLONIES/mL MIXED SKIN QI ISOLATED  STREPTOCOCCUS DYSGALACTIAE CANIS*  Refer to Blood Culture ID Panel Accession [id number]       All Labs from Last 24 Hrs:  Recent Results (from the past 24 hour(s))   POCT Glucose    Collection Time: 10/10/22 11:30 AM   Result Value Ref Range    POC Glucose 231 (H) 65 - 100 mg/dL    Performed by: Milad    POCT Glucose    Collection Time: 10/10/22  4:44 PM   Result Value Ref Range    POC Glucose 226 (H) 65 - 100 mg/dL    Performed by: Milad    POCT Glucose    Collection Time: 10/10/22  9:14 PM   Result Value Ref Range    POC Glucose 227 (H) 65 - 100 mg/dL    Performed by: Beck WATTS-19, Rapid    Collection Time: 10/10/22 10:23 PM    Specimen: Nasopharyngeal   Result Value Ref Range    Source Nasopharyngeal      SARS-CoV-2, Rapid Not detected NOTD     CBC    Collection Time: 10/11/22  6:14 AM   Result Value Ref Range    WBC 14.1 (H) 4.3 - 11.1 K/uL    RBC 3.84 (L) 4.05 - 5.2 M/uL    Hemoglobin 11.3 (L) 11.7 - 15.4 g/dL    Hematocrit 35.1 (L) 35.8 - 46.3 %    MCV 91.4 82.0 - 102.0 FL    MCH 29.4 26.1 - 32.9 PG    MCHC 32.2 31.4 - 35.0 g/dL    RDW 14.0 11.9 - 14.6 %    Platelets 944 180 - 706 K/uL    MPV 9.7 9.4 - 12.3 FL    nRBC 0.00 0.0 - 0.2 K/uL   Basic Metabolic Panel w/ Reflex to MG    Collection Time: 10/11/22  6:14 AM   Result Value Ref Range    Sodium 135 133 - 143 mmol/L    Potassium 4.1 3.5 - 5.1 mmol/L    Chloride 103 101 - 110 mmol/L    CO2 25 21 - 32 mmol/L    Anion Gap 7 2 - 11 mmol/L    Glucose 203 (H) 65 - 100 mg/dL    BUN 12 8 - 23 MG/DL    Creatinine 0.62 0.6 - 1.0 MG/DL    Est, Glom Filt Rate >60 >60 ml/min/1.73m2    Calcium 8.8 8.3 - 10.4 MG/DL   POCT Glucose    Collection Time: 10/11/22  6:36 AM   Result Value Ref Range    POC Glucose 180 (H) 65 - 100 mg/dL    Performed by: [facility]        Allergies   Allergen Reactions    Latex Rash     itching    Benzodiazepines Other (See Comments)     Dr. Miller Listen didn't wont medication combined with other medications pt was taking    Diltiazem Other (See Comments)     Other reaction(s): Headache-Intolerance    Nsaids Other (See Comments) and Swelling     Other reaction(s):  Other- (not listed) - Allergy  Elevated Blood Pressure; leg edema  Elevated Blood Pressure; leg edema      Amlodipine Other (See Comments)     Other reaction(s): Headache-Intolerance     Immunization History   Administered Date(s) Administered    COVID-19, PFIZER PURPLE top, DILUTE for use, (age 15 y+), 30mcg/0.3mL 03/01/2021, 03/29/2021, 11/23/2021    PPD Test 10/05/2022       Recent Vital Data:  Patient Vitals for the past 24 hrs:   Temp Pulse Resp BP SpO2   10/11/22 0732 98.4 °F (36.9 °C)

## 2022-10-11 NOTE — CARE COORDINATION
10/11/22 4759   Service Assessment   Patient Orientation Alert and Oriented;Person;Place; Self   Cognition Alert   History Provided By Patient   Primary Caregiver Self   Support Systems Spouse/Significant Other   Prior Functional Level Independent in ADLs/IADLs   Can patient return to prior living arrangement Other (see comment)  (Patient to go to rehab at [facility])   Ability to make needs known: Fair   Family able to assist with home care needs: Yes   Would you like for me to discuss the discharge plan with any other family members/significant others, and if so, who? No   Social/Functional History   Lives With Spouse   Type of [address] Help From Family   Active  No   Patient's  Info family   Occupation Retired   Discharge Planning   Type of [address] Prior To Admission None   [address]   DME Ordered? No   Patient expects to be discharged to:  [facility] Discharge   Transition of Care Consult (CM Consult) SNF   Partner SNF Yes   Services At/After Discharge [facility] (SNF)   Mode of Transport at Discharge [facility] transport for 2 pm to [facility])   Confirm Follow Up Transport Family   Condition of Participation: Discharge Planning   The Plan for Transition of Care is related to the following treatment goals: To [facility] SNF to increase independence. The Patient and/or Patient Representative was provided with a Choice of Provider? Patient   The Patient and/Or Patient Representative agree with the Discharge Plan? Yes   Freedom of Choice list was provided with basic dialogue that supports the patient's individualized plan of care/goals, treatment preferences, and shares the quality data associated with the providers? Yes   Interdisciplinary team meeting with Dr. Rosana mAes, CM, nursing, and PT for plan of care.  SUE followed up with patient for d/c. Patient medically stable for d/c and has bed at [facility]. Patient and spouse in room and in agreement with d/c. Spouse contacted Lucita patient daughter to update about d/c today. Patient to d/c with SNF packet given to [facility] ambulance with SNF orders, PPD, COVID, D/C summary and scripts. Patient to d/c to [facility] room 323 A report number given to [phone] to call [phone].

## 2022-10-11 NOTE — PLAN OF CARE
Problem: Discharge Planning  Goal: Discharge to home or other facility with appropriate resources  10/11/2022 1403 by Janis Valentine RN  Outcome: Adequate for Discharge  10/11/2022 1103 by Janis Valentine RN  Outcome: Progressing     Problem: Pain  Goal: Verbalizes/displays adequate comfort level or baseline comfort level  10/11/2022 1403 by Janis Valentine RN  Outcome: Adequate for Discharge  10/11/2022 1103 by Janis Valentine RN  Outcome: Progressing     Problem: Safety - Adult  Goal: Free from fall injury  10/11/2022 1403 by Janis Valentine RN  Outcome: Adequate for Discharge  10/11/2022 1103 by Janis Valentine RN  Outcome: Progressing     Problem: Skin/Tissue Integrity  Goal: Absence of new skin breakdown  Description: 1. Monitor for areas of redness and/or skin breakdown  2. Assess vascular access sites hourly  3. Every 4-6 hours minimum:  Change oxygen saturation probe site  4. Every 4-6 hours:  If on nasal continuous positive airway pressure, respiratory therapy assess nares and determine need for appliance change or resting period.   10/11/2022 1403 by Janis Valentine RN  Outcome: Adequate for Discharge  10/11/2022 1103 by Janis Valentine RN  Outcome: Progressing

## 2022-10-11 NOTE — PROGRESS NOTES
Telephone report giving to Sharyn Harding LPN at [facility]. Patient is currently laying in bed she still reports pain at least 6/10 to back and neck. She will be transferred to [facility] shortly.  at bedside. IV removed with catheter tip intact and pressure dressing applied.

## 2022-10-11 NOTE — PROGRESS NOTES
Physician Progress Note      Sebastián Mercedes  CSN #:                  [id number]  :                       1955  ADMIT DATE:       10/4/2022 9:06 PM  DISCH DATE:  Roberto Jordan  PROVIDER #:        Jared Lowery DO          QUERY TEXT:    Pt admitted with Sepsis . Pt noted to have LE cellulitis and a fluid pocket   per films at her spinal cord stimulator device . If possible, please document   in progress notes and discharge summary the relationship, if any, between   Sepsis  and LE cellulitis and or infection of her spinal cord stimulator. The medical record reflects the following:  Risk Factors: LE cellulitis, fluid collection around her spinal cord   stimulator, camille, DM, obesity  Clinical Indicators: cellulitis of her R LE, films showing --There is a small   fluid collection surrounding the subcutaneous portion of her spinal   stimulator. IM notes-- unsure if true cellulitis. ID notes-- Suspect   infection is associated with LE cellulitis vs superficial infection around   stimulator  Treatment: iv abx, ID consult, films, labs, outpatient po abx, essential home   meds. Options provided:  -- Sepsis  due to LE cellulitis  -- Sepsis due to infection of her spinal cord stimulator  -- Sepsis due to both of the above  -- Other - I will add my own diagnosis  -- Disagree - Not applicable / Not valid  -- Disagree - Clinically unable to determine / Unknown  -- Refer to Clinical Documentation Reviewer    PROVIDER RESPONSE TEXT:    Sepsis due to bacteremia due to RLE cellulitis    Query created by: Dee Nye on 10/11/2022 12:22 PM      Electronically signed by:   Jared Lowery DO 10/11/2022 12:57 PM

## 2022-10-13 LAB
BACTERIA SPEC CULT: NORMAL
BACTERIA SPEC CULT: NORMAL
SERVICE CMNT-IMP: NORMAL
SERVICE CMNT-IMP: NORMAL

## 2022-10-28 ENCOUNTER — HOSPITAL ENCOUNTER (EMERGENCY)
Dept: CT IMAGING | Age: 67
Discharge: HOME | DRG: 193 | End: 2022-10-31
Payer: MEDICARE

## 2022-10-28 ENCOUNTER — HOSPITAL ENCOUNTER (INPATIENT)
Age: 67
LOS: 4 days | Discharge: NURSING, REHAB OR LONG-TERM CARE | DRG: 193 | End: 2022-11-02
Attending: EMERGENCY MEDICINE | Admitting: HOSPITALIST
Payer: MEDICARE

## 2022-10-28 ENCOUNTER — HOSPITAL ENCOUNTER (EMERGENCY)
Dept: GENERAL RADIOLOGY | Age: 67
Discharge: HOME | DRG: 193 | End: 2022-10-31
Payer: MEDICARE

## 2022-10-28 DIAGNOSIS — G93.41 METABOLIC ENCEPHALOPATHY: Primary | ICD-10-CM

## 2022-10-28 DIAGNOSIS — D72.829 LEUKOCYTOSIS, UNSPECIFIED TYPE: ICD-10-CM

## 2022-10-28 DIAGNOSIS — J18.9 PNEUMONIA OF LEFT LOWER LOBE DUE TO INFECTIOUS ORGANISM: ICD-10-CM

## 2022-10-28 DIAGNOSIS — N17.9 AKI (ACUTE KIDNEY INJURY) (HCC): ICD-10-CM

## 2022-10-28 DIAGNOSIS — G89.4 CHRONIC PAIN SYNDROME: ICD-10-CM

## 2022-10-28 PROCEDURE — 96365 THER/PROPH/DIAG IV INF INIT: CPT

## 2022-10-28 PROCEDURE — 85025 COMPLETE CBC W/AUTO DIFF WBC: CPT

## 2022-10-28 PROCEDURE — 80053 COMPREHEN METABOLIC PANEL: CPT

## 2022-10-28 PROCEDURE — 83690 ASSAY OF LIPASE: CPT

## 2022-10-28 PROCEDURE — 80143 DRUG ASSAY ACETAMINOPHEN: CPT

## 2022-10-28 PROCEDURE — 70450 CT HEAD/BRAIN W/O DYE: CPT

## 2022-10-28 PROCEDURE — 80179 DRUG ASSAY SALICYLATE: CPT

## 2022-10-28 PROCEDURE — 99285 EMERGENCY DEPT VISIT HI MDM: CPT | Performed by: EMERGENCY MEDICINE

## 2022-10-28 PROCEDURE — 71045 X-RAY EXAM CHEST 1 VIEW: CPT

## 2022-10-28 PROCEDURE — 84484 ASSAY OF TROPONIN QUANT: CPT

## 2022-10-28 PROCEDURE — 87040 BLOOD CULTURE FOR BACTERIA: CPT

## 2022-10-28 PROCEDURE — 83605 ASSAY OF LACTIC ACID: CPT

## 2022-10-28 RX ORDER — SODIUM CHLORIDE, SODIUM LACTATE, POTASSIUM CHLORIDE, AND CALCIUM CHLORIDE .6; .31; .03; .02 G/100ML; G/100ML; G/100ML; G/100ML
1000 INJECTION, SOLUTION INTRAVENOUS ONCE
Status: COMPLETED | OUTPATIENT
Start: 2022-10-28 | End: 2022-10-29

## 2022-10-29 PROBLEM — G93.41 METABOLIC ENCEPHALOPATHY: Status: ACTIVE | Noted: 2022-10-29

## 2022-10-29 PROBLEM — J18.9 PNEUMONIA DUE TO ORGANISM: Status: ACTIVE | Noted: 2022-10-29

## 2022-10-29 PROBLEM — J18.9 PNEUMONIA OF LEFT LOWER LOBE DUE TO INFECTIOUS ORGANISM: Status: ACTIVE | Noted: 2022-10-29

## 2022-10-29 PROBLEM — D72.829 LEUKOCYTOSIS: Status: ACTIVE | Noted: 2022-10-29

## 2022-10-29 PROBLEM — N17.9 AKI (ACUTE KIDNEY INJURY) (HCC): Status: ACTIVE | Noted: 2022-10-29

## 2022-10-29 PROBLEM — D72.829 LEUKOCYTOSIS: Status: RESOLVED | Noted: 2022-10-29 | Resolved: 2022-10-29

## 2022-10-29 LAB
ALBUMIN SERPL-MCNC: 2.3 G/DL (ref 3.2–4.6)
ALBUMIN/GLOB SERPL: 0.4 {RATIO} (ref 0.4–1.6)
ALP SERPL-CCNC: 77 U/L (ref 50–136)
ALT SERPL-CCNC: 17 U/L (ref 12–65)
ANION GAP SERPL CALC-SCNC: 9 MMOL/L (ref 2–11)
APAP SERPL-MCNC: <2 UG/ML (ref 10–30)
APPEARANCE UR: ABNORMAL
AST SERPL-CCNC: 23 U/L (ref 15–37)
BACTERIA URNS QL MICRO: ABNORMAL /HPF
BASOPHILS # BLD: 0.1 K/UL (ref 0–0.2)
BASOPHILS NFR BLD: 1 % (ref 0–2)
BILIRUB SERPL-MCNC: 0.2 MG/DL (ref 0.2–1.1)
BILIRUB UR QL: ABNORMAL
BUN SERPL-MCNC: 50 MG/DL (ref 8–23)
CALCIUM SERPL-MCNC: 8.9 MG/DL (ref 8.3–10.4)
CASTS URNS QL MICRO: 0 /LPF
CHLORIDE SERPL-SCNC: 105 MMOL/L (ref 101–110)
CO2 SERPL-SCNC: 21 MMOL/L (ref 21–32)
COLOR UR: ABNORMAL
CREAT SERPL-MCNC: 2 MG/DL (ref 0.6–1)
CRYSTALS URNS QL MICRO: 0 /LPF
DIFFERENTIAL METHOD BLD: ABNORMAL
EOSINOPHIL # BLD: 0.6 K/UL (ref 0–0.8)
EOSINOPHIL NFR BLD: 3 % (ref 0.5–7.8)
EPI CELLS #/AREA URNS HPF: ABNORMAL /HPF
ERYTHROCYTE [DISTWIDTH] IN BLOOD BY AUTOMATED COUNT: 13.7 % (ref 11.9–14.6)
GLOBULIN SER CALC-MCNC: 5.9 G/DL (ref 2.8–4.5)
GLUCOSE BLD STRIP.AUTO-MCNC: 148 MG/DL (ref 65–100)
GLUCOSE BLD STRIP.AUTO-MCNC: 189 MG/DL (ref 65–100)
GLUCOSE BLD STRIP.AUTO-MCNC: 201 MG/DL (ref 65–100)
GLUCOSE BLD STRIP.AUTO-MCNC: 237 MG/DL (ref 65–100)
GLUCOSE SERPL-MCNC: 156 MG/DL (ref 65–100)
GLUCOSE UR STRIP.AUTO-MCNC: NEGATIVE MG/DL
HCT VFR BLD AUTO: 35 % (ref 35.8–46.3)
HGB BLD-MCNC: 10.8 G/DL (ref 11.7–15.4)
HGB UR QL STRIP: NEGATIVE
IMM GRANULOCYTES # BLD AUTO: 0.2 K/UL (ref 0–0.5)
IMM GRANULOCYTES NFR BLD AUTO: 1 % (ref 0–5)
KETONES UR QL STRIP.AUTO: ABNORMAL MG/DL
LACTATE SERPL-SCNC: 1 MMOL/L (ref 0.4–2)
LEUKOCYTE ESTERASE UR QL STRIP.AUTO: ABNORMAL
LIPASE SERPL-CCNC: 34 U/L (ref 73–393)
LYMPHOCYTES # BLD: 3.2 K/UL (ref 0.5–4.6)
LYMPHOCYTES NFR BLD: 19 % (ref 13–44)
MCH RBC QN AUTO: 29.8 PG (ref 26.1–32.9)
MCHC RBC AUTO-ENTMCNC: 30.9 G/DL (ref 31.4–35)
MCV RBC AUTO: 96.4 FL (ref 82–102)
MONOCYTES # BLD: 1.1 K/UL (ref 0.1–1.3)
MONOCYTES NFR BLD: 7 % (ref 4–12)
MUCOUS THREADS URNS QL MICRO: 0 /LPF
NEUTS SEG # BLD: 11.7 K/UL (ref 1.7–8.2)
NEUTS SEG NFR BLD: 70 % (ref 43–78)
NITRITE UR QL STRIP.AUTO: NEGATIVE
NRBC # BLD: 0 K/UL (ref 0–0.2)
PH UR STRIP: 5 [PH] (ref 5–9)
PLATELET # BLD AUTO: 363 K/UL (ref 150–450)
PMV BLD AUTO: 9.8 FL (ref 9.4–12.3)
POTASSIUM SERPL-SCNC: 4.1 MMOL/L (ref 3.5–5.1)
PROT SERPL-MCNC: 8.2 G/DL (ref 6.3–8.2)
PROT UR STRIP-MCNC: 30 MG/DL
RBC # BLD AUTO: 3.63 M/UL (ref 4.05–5.2)
RBC #/AREA URNS HPF: ABNORMAL /HPF
SALICYLATES SERPL-MCNC: <1.7 MG/DL (ref 2.8–20)
SERVICE CMNT-IMP: ABNORMAL
SODIUM SERPL-SCNC: 135 MMOL/L (ref 133–143)
SP GR UR REFRACTOMETRY: 1.03 (ref 1–1.02)
TROPONIN I SERPL HS-MCNC: 25.4 PG/ML (ref 0–14)
UROBILINOGEN UR QL STRIP.AUTO: 0.2 EU/DL (ref 0.2–1)
WBC # BLD AUTO: 16.8 K/UL (ref 4.3–11.1)
WBC URNS QL MICRO: ABNORMAL /HPF

## 2022-10-29 PROCEDURE — 1100000000 HC RM PRIVATE

## 2022-10-29 PROCEDURE — 6360000002 HC RX W HCPCS: Performed by: EMERGENCY MEDICINE

## 2022-10-29 PROCEDURE — 97530 THERAPEUTIC ACTIVITIES: CPT

## 2022-10-29 PROCEDURE — 2500000003 HC RX 250 WO HCPCS: Performed by: INTERNAL MEDICINE

## 2022-10-29 PROCEDURE — 2580000003 HC RX 258: Performed by: HOSPITALIST

## 2022-10-29 PROCEDURE — 6370000000 HC RX 637 (ALT 250 FOR IP): Performed by: HOSPITALIST

## 2022-10-29 PROCEDURE — 6370000000 HC RX 637 (ALT 250 FOR IP): Performed by: INTERNAL MEDICINE

## 2022-10-29 PROCEDURE — 97163 PT EVAL HIGH COMPLEX 45 MIN: CPT

## 2022-10-29 PROCEDURE — 82962 GLUCOSE BLOOD TEST: CPT

## 2022-10-29 PROCEDURE — 87086 URINE CULTURE/COLONY COUNT: CPT

## 2022-10-29 PROCEDURE — 81015 MICROSCOPIC EXAM OF URINE: CPT

## 2022-10-29 PROCEDURE — 81001 URINALYSIS AUTO W/SCOPE: CPT

## 2022-10-29 PROCEDURE — 94760 N-INVAS EAR/PLS OXIMETRY 1: CPT

## 2022-10-29 PROCEDURE — 2580000003 HC RX 258: Performed by: EMERGENCY MEDICINE

## 2022-10-29 PROCEDURE — 6360000002 HC RX W HCPCS: Performed by: HOSPITALIST

## 2022-10-29 RX ORDER — ONDANSETRON 4 MG/1
4 TABLET, ORALLY DISINTEGRATING ORAL EVERY 8 HOURS PRN
Status: DISCONTINUED | OUTPATIENT
Start: 2022-10-29 | End: 2022-11-02 | Stop reason: HOSPADM

## 2022-10-29 RX ORDER — ACETAMINOPHEN 325 MG/1
650 TABLET ORAL EVERY 6 HOURS PRN
Status: DISCONTINUED | OUTPATIENT
Start: 2022-10-29 | End: 2022-11-02 | Stop reason: HOSPADM

## 2022-10-29 RX ORDER — DEXTROSE MONOHYDRATE 100 MG/ML
INJECTION, SOLUTION INTRAVENOUS CONTINUOUS PRN
Status: DISCONTINUED | OUTPATIENT
Start: 2022-10-29 | End: 2022-11-02 | Stop reason: HOSPADM

## 2022-10-29 RX ORDER — CARVEDILOL 6.25 MG/1
12.5 TABLET ORAL 2 TIMES DAILY
Status: DISCONTINUED | OUTPATIENT
Start: 2022-10-29 | End: 2022-11-02

## 2022-10-29 RX ORDER — HYDROCODONE BITARTRATE AND ACETAMINOPHEN 5; 325 MG/1; MG/1
1 TABLET ORAL EVERY 6 HOURS PRN
Status: DISCONTINUED | OUTPATIENT
Start: 2022-10-29 | End: 2022-11-01

## 2022-10-29 RX ORDER — LANOLIN ALCOHOL/MO/W.PET/CERES
400 CREAM (GRAM) TOPICAL 2 TIMES DAILY
Status: DISCONTINUED | OUTPATIENT
Start: 2022-10-29 | End: 2022-11-02 | Stop reason: HOSPADM

## 2022-10-29 RX ORDER — SODIUM CHLORIDE 0.9 % (FLUSH) 0.9 %
5-40 SYRINGE (ML) INJECTION EVERY 12 HOURS SCHEDULED
Status: DISCONTINUED | OUTPATIENT
Start: 2022-10-29 | End: 2022-11-02 | Stop reason: HOSPADM

## 2022-10-29 RX ORDER — DULOXETIN HYDROCHLORIDE 60 MG/1
60 CAPSULE, DELAYED RELEASE ORAL DAILY
Status: DISCONTINUED | OUTPATIENT
Start: 2022-10-29 | End: 2022-11-02 | Stop reason: HOSPADM

## 2022-10-29 RX ORDER — TOPIRAMATE 100 MG/1
100 TABLET, FILM COATED ORAL 2 TIMES DAILY
Status: DISCONTINUED | OUTPATIENT
Start: 2022-10-29 | End: 2022-11-02 | Stop reason: HOSPADM

## 2022-10-29 RX ORDER — DULOXETIN HYDROCHLORIDE 30 MG/1
30 CAPSULE, DELAYED RELEASE ORAL NIGHTLY
Status: DISCONTINUED | OUTPATIENT
Start: 2022-10-29 | End: 2022-10-29 | Stop reason: SDUPTHER

## 2022-10-29 RX ORDER — INSULIN LISPRO 100 [IU]/ML
0-4 INJECTION, SOLUTION INTRAVENOUS; SUBCUTANEOUS NIGHTLY
Status: DISCONTINUED | OUTPATIENT
Start: 2022-10-29 | End: 2022-11-02 | Stop reason: HOSPADM

## 2022-10-29 RX ORDER — ACETAMINOPHEN 650 MG/1
650 SUPPOSITORY RECTAL EVERY 6 HOURS PRN
Status: DISCONTINUED | OUTPATIENT
Start: 2022-10-29 | End: 2022-11-02 | Stop reason: HOSPADM

## 2022-10-29 RX ORDER — SODIUM CHLORIDE 0.9 % (FLUSH) 0.9 %
5-40 SYRINGE (ML) INJECTION PRN
Status: DISCONTINUED | OUTPATIENT
Start: 2022-10-29 | End: 2022-11-02 | Stop reason: HOSPADM

## 2022-10-29 RX ORDER — SODIUM CHLORIDE 9 MG/ML
INJECTION, SOLUTION INTRAVENOUS CONTINUOUS
Status: DISCONTINUED | OUTPATIENT
Start: 2022-10-29 | End: 2022-10-30

## 2022-10-29 RX ORDER — LANOLIN ALCOHOL/MO/W.PET/CERES
500 CREAM (GRAM) TOPICAL 2 TIMES DAILY
Status: DISCONTINUED | OUTPATIENT
Start: 2022-10-29 | End: 2022-10-29

## 2022-10-29 RX ORDER — DULOXETIN HYDROCHLORIDE 30 MG/1
30 CAPSULE, DELAYED RELEASE ORAL NIGHTLY
Status: DISCONTINUED | OUTPATIENT
Start: 2022-10-29 | End: 2022-11-02 | Stop reason: HOSPADM

## 2022-10-29 RX ORDER — SODIUM CHLORIDE 9 MG/ML
INJECTION, SOLUTION INTRAVENOUS PRN
Status: DISCONTINUED | OUTPATIENT
Start: 2022-10-29 | End: 2022-11-02 | Stop reason: HOSPADM

## 2022-10-29 RX ORDER — TRAZODONE HYDROCHLORIDE 50 MG/1
50 TABLET ORAL NIGHTLY
Status: DISCONTINUED | OUTPATIENT
Start: 2022-10-29 | End: 2022-11-02 | Stop reason: HOSPADM

## 2022-10-29 RX ORDER — ATORVASTATIN CALCIUM 10 MG/1
10 TABLET, FILM COATED ORAL DAILY
Status: DISCONTINUED | OUTPATIENT
Start: 2022-10-29 | End: 2022-11-02 | Stop reason: HOSPADM

## 2022-10-29 RX ORDER — DOXYCYCLINE HYCLATE 100 MG/1
100 CAPSULE ORAL EVERY 12 HOURS SCHEDULED
Status: DISCONTINUED | OUTPATIENT
Start: 2022-10-29 | End: 2022-11-02 | Stop reason: HOSPADM

## 2022-10-29 RX ORDER — ONDANSETRON 2 MG/ML
4 INJECTION INTRAMUSCULAR; INTRAVENOUS EVERY 6 HOURS PRN
Status: DISCONTINUED | OUTPATIENT
Start: 2022-10-29 | End: 2022-11-02 | Stop reason: HOSPADM

## 2022-10-29 RX ORDER — GABAPENTIN 300 MG/1
300 CAPSULE ORAL 3 TIMES DAILY
Status: DISCONTINUED | OUTPATIENT
Start: 2022-10-29 | End: 2022-11-02 | Stop reason: HOSPADM

## 2022-10-29 RX ORDER — INSULIN LISPRO 100 [IU]/ML
0-8 INJECTION, SOLUTION INTRAVENOUS; SUBCUTANEOUS
Status: DISCONTINUED | OUTPATIENT
Start: 2022-10-29 | End: 2022-11-02 | Stop reason: HOSPADM

## 2022-10-29 RX ORDER — ENOXAPARIN SODIUM 100 MG/ML
40 INJECTION SUBCUTANEOUS DAILY
Status: DISCONTINUED | OUTPATIENT
Start: 2022-10-29 | End: 2022-11-02 | Stop reason: HOSPADM

## 2022-10-29 RX ORDER — POLYETHYLENE GLYCOL 3350 17 G/17G
17 POWDER, FOR SOLUTION ORAL DAILY PRN
Status: DISCONTINUED | OUTPATIENT
Start: 2022-10-29 | End: 2022-11-02 | Stop reason: HOSPADM

## 2022-10-29 RX ADMIN — DULOXETINE HYDROCHLORIDE 30 MG: 30 CAPSULE, DELAYED RELEASE ORAL at 22:04

## 2022-10-29 RX ADMIN — SODIUM CHLORIDE, PRESERVATIVE FREE 10 ML: 5 INJECTION INTRAVENOUS at 08:37

## 2022-10-29 RX ADMIN — ATORVASTATIN CALCIUM 10 MG: 10 TABLET, FILM COATED ORAL at 08:38

## 2022-10-29 RX ADMIN — TUBERCULIN PURIFIED PROTEIN DERIVATIVE 5 UNITS: 5 INJECTION, SOLUTION INTRADERMAL at 22:04

## 2022-10-29 RX ADMIN — GABAPENTIN 300 MG: 300 CAPSULE ORAL at 08:36

## 2022-10-29 RX ADMIN — Medication 400 MG: at 21:46

## 2022-10-29 RX ADMIN — SODIUM CHLORIDE: 9 INJECTION, SOLUTION INTRAVENOUS at 14:00

## 2022-10-29 RX ADMIN — GABAPENTIN 300 MG: 300 CAPSULE ORAL at 21:46

## 2022-10-29 RX ADMIN — VANCOMYCIN HYDROCHLORIDE 2000 MG: 10 INJECTION, POWDER, LYOPHILIZED, FOR SOLUTION INTRAVENOUS at 03:00

## 2022-10-29 RX ADMIN — TOPIRAMATE 100 MG: 100 TABLET, FILM COATED ORAL at 08:36

## 2022-10-29 RX ADMIN — TRAZODONE HYDROCHLORIDE 50 MG: 50 TABLET ORAL at 21:47

## 2022-10-29 RX ADMIN — Medication 400 MG: at 09:30

## 2022-10-29 RX ADMIN — SODIUM CHLORIDE: 9 INJECTION, SOLUTION INTRAVENOUS at 04:05

## 2022-10-29 RX ADMIN — CARVEDILOL 12.5 MG: 6.25 TABLET, FILM COATED ORAL at 08:36

## 2022-10-29 RX ADMIN — SODIUM CHLORIDE, POTASSIUM CHLORIDE, SODIUM LACTATE AND CALCIUM CHLORIDE 1000 ML: 600; 310; 30; 20 INJECTION, SOLUTION INTRAVENOUS at 00:17

## 2022-10-29 RX ADMIN — DOXYCYCLINE HYCLATE 100 MG: 100 CAPSULE ORAL at 08:36

## 2022-10-29 RX ADMIN — TOPIRAMATE 100 MG: 100 TABLET, FILM COATED ORAL at 21:47

## 2022-10-29 RX ADMIN — PIPERACILLIN AND TAZOBACTAM 4500 MG: 4; .5 INJECTION, POWDER, FOR SOLUTION INTRAVENOUS at 01:45

## 2022-10-29 RX ADMIN — DULOXETINE HYDROCHLORIDE 60 MG: 60 CAPSULE, DELAYED RELEASE ORAL at 16:23

## 2022-10-29 RX ADMIN — GABAPENTIN 300 MG: 300 CAPSULE ORAL at 16:22

## 2022-10-29 RX ADMIN — HYDROCODONE BITARTRATE AND ACETAMINOPHEN 1 TABLET: 5; 325 TABLET ORAL at 16:22

## 2022-10-29 RX ADMIN — ENOXAPARIN SODIUM 40 MG: 100 INJECTION SUBCUTANEOUS at 08:38

## 2022-10-29 RX ADMIN — INSULIN LISPRO 2 UNITS: 100 INJECTION, SOLUTION INTRAVENOUS; SUBCUTANEOUS at 18:30

## 2022-10-29 RX ADMIN — CARVEDILOL 12.5 MG: 6.25 TABLET, FILM COATED ORAL at 21:46

## 2022-10-29 RX ADMIN — DOXYCYCLINE HYCLATE 100 MG: 100 CAPSULE ORAL at 21:46

## 2022-10-29 RX ADMIN — HYDROCODONE BITARTRATE AND ACETAMINOPHEN 1 TABLET: 5; 325 TABLET ORAL at 21:47

## 2022-10-29 RX ADMIN — CEFTRIAXONE 1000 MG: 1 INJECTION, POWDER, FOR SOLUTION INTRAMUSCULAR; INTRAVENOUS at 05:01

## 2022-10-29 ASSESSMENT — PAIN DESCRIPTION - DESCRIPTORS
DESCRIPTORS: ACHING
DESCRIPTORS: ACHING

## 2022-10-29 ASSESSMENT — PAIN SCALES - GENERAL
PAINLEVEL_OUTOF10: 6
PAINLEVEL_OUTOF10: 0
PAINLEVEL_OUTOF10: 6
PAINLEVEL_OUTOF10: 2
PAINLEVEL_OUTOF10: 0

## 2022-10-29 ASSESSMENT — PAIN DESCRIPTION - LOCATION
LOCATION: ABDOMEN;BACK
LOCATION: GENERALIZED

## 2022-10-29 ASSESSMENT — PAIN DESCRIPTION - ORIENTATION: ORIENTATION: LEFT;MID

## 2022-10-29 ASSESSMENT — PAIN - FUNCTIONAL ASSESSMENT
PAIN_FUNCTIONAL_ASSESSMENT: PREVENTS OR INTERFERES SOME ACTIVE ACTIVITIES AND ADLS
PAIN_FUNCTIONAL_ASSESSMENT: PREVENTS OR INTERFERES SOME ACTIVE ACTIVITIES AND ADLS

## 2022-10-29 NOTE — PROGRESS NOTES
SPEECH PATHOLOGY NOTE    Consult appreciated and chart reviewed. SLP arrived at patient's room and introduced herself. Pt questioned why she needed SLP services and SLP explained reasoning for referral. Pt politely declined Swallow Evaluation and stated speech/language abilities were intact. SLP left room and was later informed by Physical Therapy that pt had difficulty answering simple questions during PT visit. Therefore, SLP will follow-up for evaluation as indicated and as pt cooperates.     Thank you for this referral,  Emerald Yoon, CCC-SLP

## 2022-10-29 NOTE — CARE COORDINATION
69 yr-old MF with hx of strokes and chronic pain brought to ED by spouse due to confusion and lethargy. Dx pneumonia. Previous admission 10/4 - 10/11 then went to [facility]. Per spouse pt was d/c from [facility] on 10/25 because insurance would only cover 14 days. Spoke with spouse Elodia Beckham [phone]) regarding need for pt to have someone stay with her at d/c. He works most days and other family help is limited. We discussed looking to see if any SNF's will take her for STR but if not he will need to  take some time off from work unless other family can help out. Referral made to [facility]. Pt currently on O2. Will continue to follow. 10/29/22 6617   Service Assessment   Patient Orientation Other (see comment)  (Confused)   Cognition Other (see comment)  (confused)   History Provided By Spouse   Primary [address] is: Legal Next of Kin   PCP Verified by CM Yes   Prior Functional Level Assistance with the following:;Bathing;Dressing; Toileting   Current Functional Level Assistance with the following:;Bathing;Dressing; Toileting   Can patient return to prior living arrangement Unknown at present   Ability to make needs known: Poor   Family able to assist with home care needs: Other (comment)  (family limited in availability)   Would you like for me to discuss the discharge plan with any other family members/significant others, and if so, who? Yes   Financial Resources [employer] Other (Comment)   Social/Functional History   Lives With Spouse   Services At/After Discharge   Transition of Care Consult (CM Consult) Home Health   Internal Home Health Yes   Condition of Participation: Discharge Planning   The Plan for Transition of Care is related to the following treatment goals: Increase strength   The Patient and/or Patient Representative was provided with a Choice of Provider?  Patient Representative   Name of the Patient Representative who was provided with the Choice of Provider and agrees with the Discharge Plan? [facility]   [place] of Choice list was provided with basic dialogue that supports the patient's individualized plan of care/goals, treatment preferences, and shares the quality data associated with the providers?   Yes

## 2022-10-29 NOTE — H&P
Hospitalist History and Physical   Admit Date:  10/28/2022 10:59 PM   Name:  Kathy Flores   Age:  69 y.o. Sex:  female  :  1955   MRN:  [id number]   Room:  ER03/03    Presenting Complaint: Altered Mental Status (Was sepsis/ possible uti / bed bound for 3 days not norm)     Reason(s) for Admission: Pneumonia due to organism [J18.9]     History of Present Illness:     69years old female with past medical history of hypertension, diabetes type 2, paroxysmal atrial fibrillation, hypothyroidism, history of strokes currently lives at nursing home brought to emergency room with confusion, decreased p.o. intake. Patient was bedbound for past 3 days, not able to get out of bed secondary to confusion. Patient not able to give much information but reports she is not feeling very well. Patient is very confused, alert but not oriented, slightly agitated. Patient was evaluated in ER, chest x-ray was obtained shows evidence of pneumonia and left lung base, CT head was negative for acute intracranial process. Lab work shows evidence of elevated white count, elevated creatinine. Patient was given antibiotics, ER physician requested admission of this patient for further evaluation and management. Review of systems limited secondary to her mental status. Review of Systems:    Unable to obtain review of systems. Assessment & Plan:     Principal Problem:    Left lower lobe pneumonia: Initiated on antibiotics, follow blood cultures. Active problems:    Metabolic encephalopathy: CT scan of head is negative, initial antibiotics for left lower lobe pneumonia, monitor mental status. Acute kidney injury: Likely secondary to decreased p.o. intake, initiate IV fluids. Diabetes type 2: Initiated on sliding scale insulin, monitor blood sugars. Hypertension: We will continue home medications, avoid nephrotoxic medications.       Anticipated discharge needs: Home with family        Diet: ADULT DIET; Regular  VTE ppx: lovenox  Code status: Full Code    Hospital Problems:  Principal Problem:    Pneumonia due to organism  Active Problems:    Chronic pain syndrome    Metabolic encephalopathy    Pneumonia of left lower lobe due to infectious organism    JOSE (acute kidney injury) ([address]    Diabetes mellitus type 2, controlled ([address]  Resolved Problems:    Leukocytosis       Past History:     Past Medical History:   Diagnosis Date    Arrhythmia     Arthritis     Atrial septal defect 10/26/2016    Small PFO. Followed by Dr. Cher Paez    Autoimmune disease [facility])     fibromyalgia    Chronic pain     fibromyalgia; chronic back pain     Diarrhea 12/15/2010    Edema 10/26/2016    Fibromyalgia 12/11/2010    GERD (gastroesophageal reflux disease)     HTN (hypertension) 12/11/2010    Hypercapnic acidosis 12/11/2010    Hyperglycemia 12/11/2010    Major depressive disorder, recurrent ([address] 03/13/2017    Obesity, Class III, BMI 40-49.9 (morbid obesity) ([address]     also type II diabetes and sleep apnea    Opioid dependence ([address] 12/11/2010    Palpitations 10/26/2016    Giancarlo Hahn returns for followup. We did not detect any arrhythmias on her event recorder. .  She takes her blood pressure with a wrist cuff and occasionally notes high pressures and heart rates in the 190. Reviewing her old records she did have an EP study 2000 this showed easily inducible atrial flutter was apparently on Rythmol for a period of time.   She now relates that she was hospitalized after     Paroxysmal atrial fibrillation ([address] 10/26/2016    Preop cardiovascular exam 01/23/2017    Primary hypothyroidism 12/13/2010    Prolonged emergence from general anesthesia     slow to wake up with hysterectomy    Psychiatric disorder     depression, anxiety    Respiratory failure ([address] 12/11/2010    Stroke ([address] 2010    was told had probable tia x2    Thyroid disease     hypo    TIA (transient ischemic attack) 10/26/2016    Type 2 diabetes mellitus without complication ([facility] 12/11/2010    insulin reliant; AVG -130; s.s. of hypoglycemia @ 70s; last A1C 6.8 on 06/16/2022    Typical atrial flutter ([facility] 01/23/2017    Unspecified adverse effect of anesthesia     pt had difficulty waking up once per pt    Unspecified sleep apnea     uses 3 Liters oxygen nightly    Weakness 12/13/2010       Past Surgical History:   Procedure Laterality Date    CARDIAC CATHETERIZATION      COLONOSCOPY N/A 5/29/2018    COLONOSCOPY/ 39 performed by Diana Morton MD at [facility] ENDOSCOPY    COLONOSCOPY N/A 12/5/2017    COLONOSCOPY /   BMI 44 performed by Diana Morton MD at [address] [place] [address] N/A 6/23/2022    T8-9 THORACIC LAMINOTOMY AND PLACEMENT OF SPINAL CORD STIMULATOR LEAD AND BATTERY/ANS performed by Jere Raza MD at [facility] MAIN OR    STELLA AND BSO (CERVIX REMOVED)          Social History     Tobacco Use    Smoking status: Never    Smokeless tobacco: Never   Substance Use Topics    Alcohol use: No      Social History     Substance and Sexual Activity   Drug Use No       Family History   Problem Relation Age of Onset    Heart Attack Father     Heart Disease Father     Post-op Nausea/Vomiting Mother     Cancer Mother         skin        Immunization History   Administered Date(s) Administered    COVID-19, PFIZER PURPLE top, DILUTE for use, (age 15 y+), 30mcg/0.3mL 03/01/2021, 03/29/2021, 11/23/2021    PPD Test 10/05/2022     Allergies   Allergen Reactions    Latex Rash     itching    Benzodiazepines Other (See Comments)     Dr. Duc Weathers didn't wont medication combined with other medications pt was taking    Diltiazem Other (See Comments)     Other reaction(s): Headache-Intolerance    Nsaids Other (See Comments) and Swelling     Other reaction(s):  Other- (not listed) - Allergy  Elevated Blood Pressure; leg edema  Elevated Blood Pressure; leg edema      Amlodipine Other (See Comments)     Other reaction(s): Headache-Intolerance     Prior to Admit Medications:  Current Outpatient Medications   Medication Instructions    ascorbic acid (VITAMIN C) 250 MG tablet Oral, DAILY    aspirin 81 mg, Oral, Nightly, Preventative Only-pt denies heart attack, stents, blood clots. Per pt possibly had a TIA in the past    carvedilol (COREG) 12.5 mg, Oral, AS NEEDED, Per pt depends on HR    carvedilol (COREG) 6.25 mg, Oral, AS NEEDED, Per pt depends on HR     celecoxib (CELEBREX) 200 mg, Oral, 2 TIMES DAILY    chlorthalidone (HYGROTON) 25 mg, Oral, AS NEEDED    coenzyme Q10 200 mg, Oral, DAILY    DULoxetine (CYMBALTA) 30 mg, Oral, Nightly    DULoxetine (CYMBALTA) 60 mg, Oral, DAILY    estradiol (ESTRACE) 0.5 mg, Oral, DAILY    ezetimibe (ZETIA) 10 mg, Oral, DAILY    ferrous sulfate (IRON 325) 325 mg, Oral, Per pt takes 3-4 times a week    gabapentin (NEURONTIN) 300 mg, Oral, 3 TIMES DAILY    hydrALAZINE (APRESOLINE) 25 mg, Oral, EVERY 6 HOURS    insulin NPH (HUMULIN N;NOVOLIN N) 10 Units, SubCUTAneous, EVERY MORNING    insulin regular (HUMULIN R;NOVOLIN R) 100 UNIT/ML injection SubCUTAneous, Sliding scale    ketoconazole (NIZORAL) 2 % cream Topical, AS NEEDED    levothyroxine (SYNTHROID) 125 MCG tablet Oral, DAILY BEFORE BREAKFAST    Lidocaine HCl 2.75 % LOTN Topical, AS NEEDED    lisinopril (PRINIVIL;ZESTRIL) 40 mg, Oral, Nightly    loratadine (CLARITIN) 10 MG tablet Strength: 10 mg; Form: tablet; SIG: take 1 tab(s) orally once a day    magnesium oxide (MAG-OX) 500 mg, Oral, 2 TIMES DAILY    omeprazole (PRILOSEC) 20 mg, Oral, 2 TIMES DAILY    ondansetron (ZOFRAN) 4 mg, Oral, EVERY 8 HOURS PRN    ondansetron (ZOFRAN-ODT) 4 MG disintegrating tablet No dose, route, or frequency recorded.     pitavastatin (LIVALO) 2 mg, Oral, DAILY    spironolactone (ALDACTONE) 25 MG tablet spironolactone 25 mg tablet    SUMAtriptan (IMITREX) 100 MG tablet sumatriptan 100 mg tablet    tiZANidine (ZANAFLEX) 4 mg, Oral, EVERY 6 HOURS PRN    topiramate (TOPAMAX) 100 MG tablet Oral, 2 TIMES DAILY    traZODone (DESYREL) 50 mg, Oral, NIGHTLY    vitamin D3 (CHOLECALCIFEROL) 125 MCG (5000 UT) TABS tablet Oral, DAILY         Objective:   Patient Vitals for the past 24 hrs:   Temp Pulse Resp BP SpO2   10/29/22 0202 -- 96 21 -- 100 %   10/29/22 0112 -- 92 -- (!) 128/59 --   10/28/22 2305 99 °F (37.2 °C) 80 22 122/76 97 %       Oxygen Therapy  SpO2: 100 %  O2 Device: Nasal cannula  O2 Flow Rate (L/min): 3 L/min    Estimated body mass index is 31.32 kg/m² as calculated from the following:    Height as of this encounter: 5' 7\" (1.702 m). Weight as of this encounter: 200 lb (90.7 kg). No intake or output data in the 24 hours ending 10/29/22 0242      Physical Exam:    Blood pressure (!) 128/59, pulse 96, temperature 99 °F (37.2 °C), resp. rate 21, height 5' 7\" (1.702 m), weight 200 lb (90.7 kg), SpO2 100 %. General:    Well nourished. Head:  Normocephalic, atraumatic  Eyes:  Sclerae appear normal.  Pupils equally round. ENT:  Nares appear normal, no drainage. Moist oral mucosa  Neck:  No restricted ROM. Trachea midline   CV:   RRR. No m/r/g. No jugular venous distension. Lungs:   Left basal rhonchi noted  Abdomen: Bowel sounds present. Soft, nontender, nondistended. Extremities: No cyanosis or clubbing. No edema  Skin:     No rashes and normal coloration. Warm and dry. Neuro:  CN II-XII grossly intact. Sensation intact. A&Ox3  Psych:  Normal mood and affect.       I have personally reviewed labs and tests showing:  Recent Labs:  Recent Results (from the past 24 hour(s))   Lactic Acid    Collection Time: 10/28/22 11:50 PM   Result Value Ref Range    Lactic Acid, Plasma 1.0 0.4 - 2.0 MMOL/L   CBC with Auto Differential    Collection Time: 10/28/22 11:51 PM   Result Value Ref Range    WBC 16.8 (H) 4.3 - 11.1 K/uL    RBC 3.63 (L) 4.05 - 5.2 M/uL    Hemoglobin 10.8 (L) 11.7 - 15.4 g/dL    Hematocrit 35.0 (L) 35.8 - 46.3 %    MCV 96.4 82.0 - 102.0 FL    MCH 29.8 26.1 - 32.9 PG    MCHC 30.9 (L) 31.4 - 35.0 g/dL    RDW 13.7 11.9 - 14.6 %    Platelets 884 509 - 608 K/uL    MPV 9.8 9.4 - 12.3 FL    nRBC 0.00 0.0 - 0.2 K/uL    Differential Type AUTOMATED      Seg Neutrophils 70 43 - 78 %    Lymphocytes 19 13 - 44 %    Monocytes 7 4.0 - 12.0 %    Eosinophils % 3 0.5 - 7.8 %    Basophils 1 0.0 - 2.0 %    Immature Granulocytes 1 0.0 - 5.0 %    Segs Absolute 11.7 (H) 1.7 - 8.2 K/UL    Absolute Lymph # 3.2 0.5 - 4.6 K/UL    Absolute Mono # 1.1 0.1 - 1.3 K/UL    Absolute Eos # 0.6 0.0 - 0.8 K/UL    Basophils Absolute 0.1 0.0 - 0.2 K/UL    Absolute Immature Granulocyte 0.2 0.0 - 0.5 K/UL   CMP    Collection Time: 10/28/22 11:51 PM   Result Value Ref Range    Sodium 135 133 - 143 mmol/L    Potassium 4.1 3.5 - 5.1 mmol/L    Chloride 105 101 - 110 mmol/L    CO2 21 21 - 32 mmol/L    Anion Gap 9 2 - 11 mmol/L    Glucose 156 (H) 65 - 100 mg/dL    BUN 50 (H) 8 - 23 MG/DL    Creatinine 2.00 (H) 0.6 - 1.0 MG/DL    Est, Glom Filt Rate 27 (L) >60 ml/min/1.73m2    Calcium 8.9 8.3 - 10.4 MG/DL    Total Bilirubin 0.2 0.2 - 1.1 MG/DL    ALT 17 12 - 65 U/L    AST 23 15 - 37 U/L    Alk Phosphatase 77 50 - 136 U/L    Total Protein 8.2 6.3 - 8.2 g/dL    Albumin 2.3 (L) 3.2 - 4.6 g/dL    Globulin 5.9 (H) 2.8 - 4.5 g/dL    Albumin/Globulin Ratio 0.4 0.4 - 1.6     Troponin    Collection Time: 10/28/22 11:51 PM   Result Value Ref Range    Troponin, High Sensitivity 25.4 (H) 0 - 14 pg/mL   Acetaminophen Level    Collection Time: 10/28/22 11:51 PM   Result Value Ref Range    Acetaminophen Level <2 (L) 10 - 30 ug/mL   Salicylate    Collection Time: 10/28/22 11:51 PM   Result Value Ref Range    Salicylate, Serum <9.6 (L) 2.8 - 20.0 MG/DL   Lipase    Collection Time: 10/28/22 11:51 PM   Result Value Ref Range    Lipase 34 (L) 73 - 393 U/L   Urinalysis    Collection Time: 10/29/22  1:58 AM   Result Value Ref Range    Color, UA DARK YELLOW      Appearance CLOUDY      Specific Gravity, UA 1.026 (H) 1.001 - 1.023      pH, Urine 5.0 5.0 - 9.0 Protein, UA 30 (A) NEG mg/dL    Glucose, UA Negative mg/dL    Ketones, Urine TRACE (A) NEG mg/dL    Bilirubin Urine MODERATE (A) NEG      Blood, Urine Negative NEG      Urobilinogen, Urine 0.2 0.2 - 1.0 EU/dL    Nitrite, Urine Negative NEG      Leukocyte Esterase, Urine TRACE (A) NEG     Urinalysis, Micro    Collection Time: 10/29/22  1:58 AM   Result Value Ref Range    WBC, UA 0-3 0 /hpf    RBC, UA 0-3 0 /hpf    Epithelial Cells UA 5-10 0 /hpf    BACTERIA, URINE 2+ (H) 0 /hpf    Casts 0 0 /lpf    Crystals 0 0 /LPF    Mucus, UA 0 0 /lpf       I have personally reviewed imaging studies showing:  CT Head W/O Contrast    Result Date: 10/29/2022  EXAM: Noncontrast CT head. INDICATION: Mental status changes. COMPARISON: Prior MRI brain on December 13, 2010. TECHNIQUE: Noncontrast CT images of the head were obtained. Radiation dose reduction techniques were used for this study. Our CT scanners use one or all of the following:  Automated exposure control, adjustment of the mA or kV according to patient size, iterative reconstruction. FINDINGS: Brain volume is appropriate for age. No acute infarct, hemorrhage or mass is identified. There is no mass effect, midline shift or depressed fracture. The visualized paranasal sinuses and mastoid air cells are clear. No acute process. XR CHEST PORTABLE    Result Date: 10/29/2022  EXAM: Chest x-ray. INDICATION: Dyspnea. COMPARISON: Prior chest x-ray on October 4, 2022. TECHNIQUE: Frontal view chest x-ray. FINDINGS: There is new left lung base atelectasis or infiltrate. Right lung is clear. Again noted is cardiomegaly and elevation of the right diaphragm. No pneumothorax or significant pleural effusion is seen. New left lung base atelectasis or pneumonia.       Echocardiogram:  10/04/22    TRANSTHORACIC ECHOCARDIOGRAM (TTE) COMPLETE (CONTRAST/BUBBLE/3D PRN) 10/06/2022 12:33 PM, 10/06/2022 12:00 AM (Final)    Interpretation Summary    Left Ventricle: Normal left ventricular systolic function with a visually estimated EF of 55 - 60%. Left ventricle size is normal. Normal wall thickness. Normal wall motion. Abnormal diastolic function. Mitral Valve: Mild regurgitation. Left Atrium: Left atrium is moderately dilated. Technical qualifiers: Color flow Doppler was performed and pulse wave and/or continuous wave Doppler was performed. Contrast used: Definity.     Signed by: Payal Alvarez MD on 10/6/2022 12:33 PM, Signed by: Unknown Provider Result on 10/6/2022 12:00 AM        Orders Placed This Encounter   Medications    lactated ringers bolus    vancomycin (VANCOCIN) 2000 mg in 0.9% sodium chloride 500 mL IVPB     Order Specific Question:   Antimicrobial Indications     Answer:   Sepsis of Unknown Etiology    piperacillin-tazobactam (ZOSYN) 4,500 mg in sodium chloride 0.9 % 100 mL IVPB (mini-bag)     Order Specific Question:   Antimicrobial Indications     Answer:   Skin and Soft Tissue Infection    sodium chloride flush 0.9 % injection 5-40 mL    sodium chloride flush 0.9 % injection 5-40 mL    0.9 % sodium chloride infusion    enoxaparin (LOVENOX) injection 40 mg     Order Specific Question:   Indication of Use     Answer:   Prophylaxis-DVT/PE    OR Linked Order Group     ondansetron (ZOFRAN-ODT) disintegrating tablet 4 mg     ondansetron (ZOFRAN) injection 4 mg    polyethylene glycol (GLYCOLAX) packet 17 g    OR Linked Order Group     acetaminophen (TYLENOL) tablet 650 mg     acetaminophen (TYLENOL) suppository 650 mg    AND Linked Order Group     cefTRIAXone (ROCEPHIN) 1,000 mg in sodium chloride 0.9 % 50 mL IVPB mini-bag      Order Specific Question:   Antimicrobial Indications      Answer:   Pneumonia (CAP)      Order Specific Question:   CAP duration of therapy      Answer:   7 days     doxycycline hyclate (VIBRA-TABS) tablet 100 mg      Order Specific Question:   Antimicrobial Indications      Answer:   Pneumonia (CAP)      Order Specific Question:   CAP duration of therapy      Answer:   7 days         Signed:  Hugo Sever, MD    Part of this note may have been written by using a voice dictation software. The note has been proof read but may still contain some grammatical/other typographical errors.

## 2022-10-29 NOTE — PROGRESS NOTES
TRANSFER - IN REPORT:    Verbal report received from Katrin on Telephone Morrison  being received from ED for routine progression of patient care      Report consisted of patient's Situation, Background, Assessment and   Recommendations(SBAR). Information from the following report(s) ED SBAR was reviewed with the receiving nurse. Opportunity for questions and clarification was provided. Assessment completed upon patient's arrival to unit and care assumed.

## 2022-10-29 NOTE — ED NOTES
TRANSFER - OUT REPORT:    Verbal report given to RN on Spencer Petties  being transferred to [address] for routine progression of patient care       Report consisted of patient's Situation, Background, Assessment and   Recommendations(SBAR). Information from the following report(s) ED SBAR was reviewed with the receiving nurse. Lines:   Peripheral IV 10/29/22 Left Antecubital (Active)       Peripheral IV 10/29/22 Right Antecubital (Active)        Opportunity for questions and clarification was provided.       Patient transported with:  Registered Nurse       Sanjuanita Mooney, [address] [facility]  10/29/22 0462

## 2022-10-29 NOTE — PROGRESS NOTES
ACUTE PHYSICAL THERAPY GOALS:   (Developed with and agreed upon by patient and/or caregiver.)  DISCHARGE GOALS :  (1.)Ms. Rebeka Lopez will move from supine to sit and sit to supine  with CONTACT GUARD ASSIST with HOB 20 deg using rail. (2.)Ms. Rebeka Lopez will transfer from bed to chair and chair to bed with MINIMAL ASSIST using walker. (3.)Ms. Rebeka Lopez will ambulate with MINIMAL ASSIST for 40-50 feet with rolling walker. ________________________________________________________________________________________________     PHYSICAL THERAPY Initial Assessment and PM  (Link to Caseload Tracking: PT Visit Days : 1  Acknowledge Orders  Time In/Out  PT Charge Capture  Rehab Caseload Tracker    Malou Burt is a 69 y.o. female   PRIMARY DIAGNOSIS: Pneumonia due to organism  Metabolic encephalopathy [M54.53]  Pneumonia due to organism [J18.9]  JOSE (acute kidney injury) ([facility] [address] [N17.9]  Pneumonia of left lower lobe due to infectious organism [J18.9]  Leukocytosis, unspecified type [D72.829]       Reason for Referral: Generalized Muscle Weakness (M62.81)  Other lack of cordination (R27.8)  Difficulty in walking, Not elsewhere classified (R26.2)  Other abnormalities of gait and mobility (R26.89)  Inpatient: Payor: MEDICARE / Plan: MEDICARE PART A AND B / Product Type: *No Product type* /     ASSESSMENT:     REHAB RECOMMENDATIONS:   Recommendation to date pending progress:  Setting:  Home Health Therapy  Pt needs 24/7 care , completely unsafe to be left alone    Equipment:    To Be Determined     ASSESSMENT:  Ms. Rebeka Lopez presented as alert & oriented to person, place, time & situation but pt was slow to process cues & did perseverate during activity. This pt is significantly limited functionally & is unable to return home without 24/7 help. This pt will benefit from follow up therapy while in house & will also need follow up [address] [place] [address] DC.  This pt is currently 02 dependent on 4 liters, with tendency to drop with activity, needing cues to deep breathe to recover. MSW aware of DC concerns & needs.        [facility] [address] [address] AM-PAC 6 Clicks Basic Mobility Inpatient Short Form  AM-PAC Mobility Inpatient   How much difficulty turning over in bed?: A Little  How much difficulty sitting down on / standing up from a chair with arms?: A Lot  How much difficulty moving from lying on back to sitting on side of bed?: A Little  How much help from another person moving to and from a bed to a chair?: A Lot  How much help from another person needed to walk in hospital room?: A Lot  How much help from another person for climbing 3-5 steps with a railing?: Total  -PAC Inpatient Mobility Raw Score : 13  AM-PAC Inpatient T-Scale Score : 36.74  Mobility Inpatient CMS 0-100% Score: 64.91  Mobility Inpatient CMS G-Code Modifier : CL    SUBJECTIVE:   Ms. Josesito Marie states, \" I can't find my words \"    Social/Functional Lives With: Spouse (facility staff)  Type of Home: House  Home Layout: Two level (pt has stair lift)  Home Access: Stairs to enter with rails  Entrance Stairs - Number of Steps: 7  Entrance Stairs - Rails: Left  Ambulation Assistance: Needs assistance  Transfer Assistance: Needs assistance  Additional Comments: pt was recently in [facility] for rehab until this admission    OBJECTIVE:     PAIN: Leim Bjorn / O2: PRECAUTION / Nathalie Worrell / Marlin Givens:   Pre Treatment:   Pain Assessment: None - Denies Pain      Post Treatment: 0/10 Vitals NT       Oxygen 94% on 4 liters pre exertion , 88% post exertion with ~60sec to recover to 93% on 4 liters      IV and 02 support    RESTRICTIONS/PRECAUTIONS:  Restrictions/Precautions: Fall Risk                 GROSS EVALUATION: Intact Impaired (Comments):   AROM []  Decreased & non-functional   PROM []    Strength []  Decreased & non-functional   Balance []  Decreased & non-functional   Posture [] N/A   Sensation [x]  grossly   Coordination []   Decreased & non-functional   Tone [x]     Edema []    Activity Tolerance [x] poor    []      COGNITION/  PERCEPTION: Intact Impaired (Comments):   Orientation [x]  X 4   Vision [x]     Hearing [x]     Cognition  []  Slow to process, word find disorder, perseverates during activity     MOBILITY: I Mod I S SBA CGA Min Mod Max Total  NT x2 Comments:   Bed Mobility    Rolling [] [] [] [] [] [x] [] [] [] [] []    Supine to Sit [] [] [] [] [] [x] [] [] [] [] []    Scooting [] [] [] [] [] [x] [] [] [] [] []    Sit to Supine [] [] [] [] [] [x] [] [] [] [] []    Transfers    Sit to Stand [] [] [] [] [] [] [x] [] [] [] [] With walker   Bed to Chair [] [] [] [] [] [] [] [] [] [] []    Stand to Sit [] [] [] [] [] [] [x] [] [] [] []     [] [] [] [] [] [] [] [] [] [] []    I=Independent, Mod I=Modified Independent, S=Supervision, SBA=Standby Assistance, CGA=Contact Guard Assistance,   Min=Minimal Assistance, Mod=Moderate Assistance, Max=Maximal Assistance, Total=Total Assistance, NT=Not Tested    GAIT: I Mod I S SBA CGA Min Mod Max Total  NT x2 Comments:   Level of Assistance [] [] [] [] [] [] [x] [] [] [] [] Pt performed another 3rd attempt to walk 5 ft with a rolling walker after the previously noted gait activity   Distance additional 5 ft after an initial 5ft gait assessment     DME Rolling Walker    Gait Quality Decreased michelle , Decreased step clearance, Decreased step length, and Trunk sway increased    Weightbearing Status Restrictions/Precautions  Restrictions/Precautions: Fall Risk    Stairs NT     I=Independent, Mod I=Modified Independent, S=Supervision, SBA=Standby Assistance, CGA=Contact Guard Assistance,   Min=Minimal Assistance, Mod=Moderate Assistance, Max=Maximal Assistance, Total=Total Assistance, NT=Not Tested    PLAN:   FREQUENCY AND DURATION: Daily for duration of hospital stay or until stated goals are met, whichever comes first.    THERAPY PROGNOSIS: Fair    PROBLEM LIST:   (Skilled intervention is medically necessary to address:)  Decreased ADL/Functional Activities  Decreased Activity Tolerance  Decreased AROM/PROM  Decreased Balance  Decreased Cognition  Decreased Coordination  Decreased Gait Ability  Decreased Safety Awareness  Decreased Strength  Decreased Transfer Abilities INTERVENTIONS PLANNED:   (Benefits and precautions of physical therapy have been discussed with the patient.)  Therapeutic Activity  Therapeutic Exercise/HEP  Gait Training  Education       TREATMENT:   EVALUATION: HIGH COMPLEXITY: (Untimed Charge)    TREATMENT:   Therapeutic Activity (40 Minutes): Therapeutic activity included reviewed PT role, POC & PT expectations, pt performed LE AROM as warm up, EOB sitting balance with wt shift R<>L & F<>B, repeated sit<>stand,  standing balance activity with walker performing wt shifting,Progressive gait training an additional 5 ft after an initial 5 ft gait assessment, then a 3rd attempt to gait 5 more additional ft, pt performed repeated bed mobility for practice to improve functional Activity tolerance, Balance, Coordination, Mobility, Strength, and ROM. TREATMENT GRID:  N/A    AFTER TREATMENT PRECAUTIONS: Alarm Activated, Bed, Bed/Chair Locked, Call light within reach, Needs within reach, and RN notified    INTERDISCIPLINARY COLLABORATION:  RN/ PCT and RN Case Manager/      EDUCATION: Education Given To: Patient  Education Provided: Role of Therapy;Plan of Care;Home Exercise Program;Precautions;Transfer Training;IADL Safety;Equipment; Fall Prevention Strategies  Education Method: Demonstration;Verbal;Teach Back  Barriers to Learning: Cognition  Education Outcome: Continued education needed    TIME IN/OUT:  Time In: 1250  Time Out: [address]  Minutes: [address]  [place]

## 2022-10-29 NOTE — CARE COORDINATION
10/29/22 1554   Readmission Assessment   Number of Days since last admission? 8-30 days   Previous Disposition SNF   Who is being Interviewed Caregiver   What was the patient's/caregiver's perception as to why they think they needed to return back to the hospital? Other (Comment)  (pt was confused, unable to get out of bed)   Did you visit your Primary Care Physician after you left the hospital, before you returned this time? No   Why weren't you able to visit your PCP? Other (Comment)   Did you see a specialist, such as Cardiac, Pulmonary, Orthopedic Physician, etc. after you left the hospital? Yes   Who advised the patient to return to the hospital? Caregiver   Does the patient report anything that got in the way of taking their medications?  No

## 2022-10-29 NOTE — PROGRESS NOTES
Hospitalist Progress Note   Admit Date:  10/28/2022 10:59 PM   Name:  Susan Hill   Age:  69 y.o. Sex:  female  :  1955   MRN:  [id number]   Room:  [facility]/    Presenting Complaint: Altered Mental Status (Was sepsis/ possible uti / bed bound for 3 days not norm)     Reason(s) for Admission: Metabolic encephalopathy [W96.23]  Pneumonia due to organism [J18.9]  JOSE (acute kidney injury) ([facility] [address] [N17.9]  Pneumonia of left lower lobe due to infectious organism [J18.9]  Leukocytosis, unspecified type [facility] Course:       Ms. Samson Holbrook is a 70 yo female with PMH of GBS bacteremia 10,2022 completed amoxil 10-20-22, debility, HTN, DM2, pAFIB, CVA who is evaluated with weakness/ acute encephalopathy. Found with left base pneumonia. CT head negative. UA positive. She is on course of IV antibiotics. Blood cultures pending. She has JOSE- being hydrated. Discharge plans pending. Subjective & 24hr Events (10/29/22): Aware she is admitted at [address] [place] [address], eating, denies choking, not very mobile since recent admit. Assessment & Plan:     Principal Problem:    Pneumonia due to organism  Plan:   Wean O2 as tolerant  D1 rocephin and doxycycline  Followup blood cultures   Consult SLP        Active Problems:    Chronic pain syndrome  Plan:   Norco as needed        Metabolic encephalopathy  Plan:   Seems improved  Followup mentation           JOSE (acute kidney injury) ([facility] [address]  Plan:   Continued IVF  Followup labs       UTI:  D1 rocephin   Add culture      Diabetes mellitus type 2, controlled ([facility] [address]  Plan:   SSI      HTN:  Continued coreg        Depression:  Continued chronic meds       Anticipated discharge needs:      Pending     Diet:  ADULT DIET;  Regular  DVT PPx: lovenox  Code status: Full Code    Hospital Problems:  Principal Problem:    Pneumonia due to organism  Active Problems:    Chronic pain syndrome    Metabolic encephalopathy    Pneumonia of left lower lobe due to infectious organism    JOSE (acute kidney injury) ([facility] [address]    Diabetes mellitus type 2, controlled ([facility] [address]  Resolved Problems:    Leukocytosis      Objective:   Patient Vitals for the past 24 hrs:   Temp Pulse Resp BP SpO2   10/29/22 1210 -- 83 19 (!) 146/63 100 %   10/29/22 0807 -- 93 18 137/80 100 %   10/29/22 0337 98.2 °F (36.8 °C) 96 20 (!) 150/75 100 %   10/29/22 0202 -- 96 21 -- 100 %   10/29/22 0112 -- 92 -- (!) 128/59 --   10/28/22 2305 99 °F (37.2 °C) 80 22 122/76 97 %       Oxygen Therapy  SpO2: 100 %  O2 Device: Nasal cannula  O2 Flow Rate (L/min): 3 L/min    Estimated body mass index is 31.32 kg/m² as calculated from the following:    Height as of this encounter: 5' 7\" (1.702 m). Weight as of this encounter: 200 lb (90.7 kg). No intake or output data in the 24 hours ending 10/29/22 1541      Physical Exam:     Blood pressure (!) 146/63, pulse 83, temperature 98.2 °F (36.8 °C), temperature source Oral, resp. rate 19, height 5' 7\" (1.702 m), weight 200 lb (90.7 kg), SpO2 100 %. General:    Well nourished. Elderly, no distress   CV:   RRR. III/VI KARISSA LUSB  No jugular venous distension. No edema   Lungs:   CTAB. No wheezing, rhonchi, or rales. Symmetric expansion. Anterior   Abdomen: Bowel sounds present. Soft, nontender, nondistended. Extremities: No cyanosis or clubbing. No edema  Skin:     No rashes and normal coloration. Warm and dry. Neuro:  A and O x 3  Psych:  Flat affect.       I have personally reviewed labs and tests showing:  Recent Labs:  Recent Results (from the past 48 hour(s))   Lactic Acid    Collection Time: 10/28/22 11:50 PM   Result Value Ref Range    Lactic Acid, Plasma 1.0 0.4 - 2.0 MMOL/L   CBC with Auto Differential    Collection Time: 10/28/22 11:51 PM   Result Value Ref Range    WBC 16.8 (H) 4.3 - 11.1 K/uL    RBC 3.63 (L) 4.05 - 5.2 M/uL    Hemoglobin 10.8 (L) 11.7 - 15.4 g/dL    Hematocrit 35.0 (L) 35.8 - 46.3 %    MCV 96.4 82.0 - 102.0 FL    MCH 29.8 26.1 - 32.9 PG MCHC 30.9 (L) 31.4 - 35.0 g/dL    RDW 13.7 11.9 - 14.6 %    Platelets 348 628 - 718 K/uL    MPV 9.8 9.4 - 12.3 FL    nRBC 0.00 0.0 - 0.2 K/uL    Differential Type AUTOMATED      Seg Neutrophils 70 43 - 78 %    Lymphocytes 19 13 - 44 %    Monocytes 7 4.0 - 12.0 %    Eosinophils % 3 0.5 - 7.8 %    Basophils 1 0.0 - 2.0 %    Immature Granulocytes 1 0.0 - 5.0 %    Segs Absolute 11.7 (H) 1.7 - 8.2 K/UL    Absolute Lymph # 3.2 0.5 - 4.6 K/UL    Absolute Mono # 1.1 0.1 - 1.3 K/UL    Absolute Eos # 0.6 0.0 - 0.8 K/UL    Basophils Absolute 0.1 0.0 - 0.2 K/UL    Absolute Immature Granulocyte 0.2 0.0 - 0.5 K/UL   CMP    Collection Time: 10/28/22 11:51 PM   Result Value Ref Range    Sodium 135 133 - 143 mmol/L    Potassium 4.1 3.5 - 5.1 mmol/L    Chloride 105 101 - 110 mmol/L    CO2 21 21 - 32 mmol/L    Anion Gap 9 2 - 11 mmol/L    Glucose 156 (H) 65 - 100 mg/dL    BUN 50 (H) 8 - 23 MG/DL    Creatinine 2.00 (H) 0.6 - 1.0 MG/DL    Est, Glom Filt Rate 27 (L) >60 ml/min/1.73m2    Calcium 8.9 8.3 - 10.4 MG/DL    Total Bilirubin 0.2 0.2 - 1.1 MG/DL    ALT 17 12 - 65 U/L    AST 23 15 - 37 U/L    Alk Phosphatase 77 50 - 136 U/L    Total Protein 8.2 6.3 - 8.2 g/dL    Albumin 2.3 (L) 3.2 - 4.6 g/dL    Globulin 5.9 (H) 2.8 - 4.5 g/dL    Albumin/Globulin Ratio 0.4 0.4 - 1.6     Troponin    Collection Time: 10/28/22 11:51 PM   Result Value Ref Range    Troponin, High Sensitivity 25.4 (H) 0 - 14 pg/mL   Acetaminophen Level    Collection Time: 10/28/22 11:51 PM   Result Value Ref Range    Acetaminophen Level <2 (L) 10 - 30 ug/mL   Salicylate    Collection Time: 10/28/22 11:51 PM   Result Value Ref Range    Salicylate, Serum <2.3 (L) 2.8 - 20.0 MG/DL   Lipase    Collection Time: 10/28/22 11:51 PM   Result Value Ref Range    Lipase 34 (L) 73 - 393 U/L   Urinalysis    Collection Time: 10/29/22  1:58 AM   Result Value Ref Range    Color, UA DARK YELLOW      Appearance CLOUDY      Specific Gravity, UA 1.026 (H) 1.001 - 1.023      pH, Urine 5.0 5.0 - 9.0      Protein, UA 30 (A) NEG mg/dL    Glucose, UA Negative mg/dL    Ketones, Urine TRACE (A) NEG mg/dL    Bilirubin Urine MODERATE (A) NEG      Blood, Urine Negative NEG      Urobilinogen, Urine 0.2 0.2 - 1.0 EU/dL    Nitrite, Urine Negative NEG      Leukocyte Esterase, Urine TRACE (A) NEG     Urinalysis, Micro    Collection Time: 10/29/22  1:58 AM   Result Value Ref Range    WBC, UA 0-3 0 /hpf    RBC, UA 0-3 0 /hpf    Epithelial Cells UA 5-10 0 /hpf    BACTERIA, URINE 2+ (H) 0 /hpf    Casts 0 0 /lpf    Crystals 0 0 /LPF    Mucus, UA 0 0 /lpf   POCT Glucose    Collection Time: 10/29/22  6:07 AM   Result Value Ref Range    POC Glucose 148 (H) 65 - 100 mg/dL    Performed by: Taihra    POCT Glucose    Collection Time: 10/29/22 12:11 PM   Result Value Ref Range    POC Glucose 189 (H) 65 - 100 mg/dL    Performed by: Waqas        I have personally reviewed imaging studies showing: Other Studies:  CT Head W/O Contrast   Final Result   No acute process. XR CHEST PORTABLE   Final Result   New left lung base atelectasis or pneumonia.           Current Meds:  Current Facility-Administered Medications   Medication Dose Route Frequency    sodium chloride flush 0.9 % injection 5-40 mL  5-40 mL IntraVENous 2 times per day    sodium chloride flush 0.9 % injection 5-40 mL  5-40 mL IntraVENous PRN    0.9 % sodium chloride infusion   IntraVENous PRN    enoxaparin (LOVENOX) injection 40 mg  40 mg SubCUTAneous Daily    ondansetron (ZOFRAN-ODT) disintegrating tablet 4 mg  4 mg Oral Q8H PRN    Or    ondansetron (ZOFRAN) injection 4 mg  4 mg IntraVENous Q6H PRN    polyethylene glycol (GLYCOLAX) packet 17 g  17 g Oral Daily PRN    acetaminophen (TYLENOL) tablet 650 mg  650 mg Oral Q6H PRN    Or    acetaminophen (TYLENOL) suppository 650 mg  650 mg Rectal Q6H PRN    cefTRIAXone (ROCEPHIN) 1,000 mg in sodium chloride 0.9 % 50 mL IVPB mini-bag  1,000 mg IntraVENous Q24H    And    doxycycline hyclate (VIBRAMYCIN) capsule 100 mg  100 mg Oral 2 times per day    0.9 % sodium chloride infusion   IntraVENous Continuous    insulin lispro (HUMALOG) injection vial 0-8 Units  0-8 Units SubCUTAneous TID WC    insulin lispro (HUMALOG) injection vial 0-4 Units  0-4 Units SubCUTAneous Nightly    carvedilol (COREG) tablet 12.5 mg  12.5 mg Oral BID    gabapentin (NEURONTIN) capsule 300 mg  300 mg Oral TID    atorvastatin (LIPITOR) tablet 10 mg  10 mg Oral Daily    topiramate (TOPAMAX) tablet 100 mg  100 mg Oral BID    traZODone (DESYREL) tablet 50 mg  50 mg Oral Nightly    glucose chewable tablet 16 g  4 tablet Oral PRN    dextrose bolus 10% 125 mL  125 mL IntraVENous PRN    Or    dextrose bolus 10% 250 mL  250 mL IntraVENous PRN    glucagon (rDNA) injection 1 mg  1 mg SubCUTAneous PRN    dextrose 10 % infusion   IntraVENous Continuous PRN    magnesium oxide (MAG-OX) tablet 400 mg  400 mg Oral BID    DULoxetine (CYMBALTA) extended release capsule 60 mg  60 mg Oral Daily    And    DULoxetine (CYMBALTA) extended release capsule 30 mg  30 mg Oral Nightly    HYDROcodone-acetaminophen (NORCO) 5-325 MG per tablet 1 tablet  1 tablet Oral Q6H PRN       Signed:  Alessia Suazo MD    Part of this note may have been written by using a voice dictation software. The note has been proof read but may still contain some grammatical/other typographical errors.

## 2022-10-30 ENCOUNTER — HOME HEALTH ADMISSION (OUTPATIENT)
Dept: HOME HEALTH SERVICES | Facility: HOME HEALTH | Age: 67
End: 2022-10-30

## 2022-10-30 LAB
ALBUMIN SERPL-MCNC: 1.9 G/DL (ref 3.2–4.6)
ALBUMIN/GLOB SERPL: 0.4 {RATIO} (ref 0.4–1.6)
ALP SERPL-CCNC: 65 U/L (ref 50–136)
ALT SERPL-CCNC: 14 U/L (ref 12–65)
ANION GAP SERPL CALC-SCNC: 5 MMOL/L (ref 2–11)
AST SERPL-CCNC: 16 U/L (ref 15–37)
BASOPHILS # BLD: 0.1 K/UL (ref 0–0.2)
BASOPHILS NFR BLD: 1 % (ref 0–2)
BILIRUB SERPL-MCNC: 0.2 MG/DL (ref 0.2–1.1)
BUN SERPL-MCNC: 20 MG/DL (ref 8–23)
CALCIUM SERPL-MCNC: 8.8 MG/DL (ref 8.3–10.4)
CHLORIDE SERPL-SCNC: 110 MMOL/L (ref 101–110)
CO2 SERPL-SCNC: 24 MMOL/L (ref 21–32)
CREAT SERPL-MCNC: 0.57 MG/DL (ref 0.6–1)
DIFFERENTIAL METHOD BLD: ABNORMAL
EOSINOPHIL # BLD: 0.4 K/UL (ref 0–0.8)
EOSINOPHIL NFR BLD: 4 % (ref 0.5–7.8)
ERYTHROCYTE [DISTWIDTH] IN BLOOD BY AUTOMATED COUNT: 13.6 % (ref 11.9–14.6)
GLOBULIN SER CALC-MCNC: 4.9 G/DL (ref 2.8–4.5)
GLUCOSE BLD STRIP.AUTO-MCNC: 180 MG/DL (ref 65–100)
GLUCOSE BLD STRIP.AUTO-MCNC: 180 MG/DL (ref 65–100)
GLUCOSE BLD STRIP.AUTO-MCNC: 184 MG/DL (ref 65–100)
GLUCOSE BLD STRIP.AUTO-MCNC: 198 MG/DL (ref 65–100)
GLUCOSE SERPL-MCNC: 178 MG/DL (ref 65–100)
HCT VFR BLD AUTO: 29.5 % (ref 35.8–46.3)
HGB BLD-MCNC: 9.2 G/DL (ref 11.7–15.4)
IMM GRANULOCYTES # BLD AUTO: 0.1 K/UL (ref 0–0.5)
IMM GRANULOCYTES NFR BLD AUTO: 1 % (ref 0–5)
LYMPHOCYTES # BLD: 2.2 K/UL (ref 0.5–4.6)
LYMPHOCYTES NFR BLD: 24 % (ref 13–44)
MCH RBC QN AUTO: 29.2 PG (ref 26.1–32.9)
MCHC RBC AUTO-ENTMCNC: 31.2 G/DL (ref 31.4–35)
MCV RBC AUTO: 93.7 FL (ref 82–102)
MONOCYTES # BLD: 0.8 K/UL (ref 0.1–1.3)
MONOCYTES NFR BLD: 9 % (ref 4–12)
NEUTS SEG # BLD: 5.6 K/UL (ref 1.7–8.2)
NEUTS SEG NFR BLD: 62 % (ref 43–78)
NRBC # BLD: 0 K/UL (ref 0–0.2)
PLATELET # BLD AUTO: 298 K/UL (ref 150–450)
PMV BLD AUTO: 9.5 FL (ref 9.4–12.3)
POTASSIUM SERPL-SCNC: 3.8 MMOL/L (ref 3.5–5.1)
PROT SERPL-MCNC: 6.8 G/DL (ref 6.3–8.2)
RBC # BLD AUTO: 3.15 M/UL (ref 4.05–5.2)
SERVICE CMNT-IMP: ABNORMAL
SODIUM SERPL-SCNC: 139 MMOL/L (ref 133–143)
WBC # BLD AUTO: 9.2 K/UL (ref 4.3–11.1)

## 2022-10-30 PROCEDURE — 6370000000 HC RX 637 (ALT 250 FOR IP): Performed by: INTERNAL MEDICINE

## 2022-10-30 PROCEDURE — 6360000002 HC RX W HCPCS: Performed by: HOSPITALIST

## 2022-10-30 PROCEDURE — 97535 SELF CARE MNGMENT TRAINING: CPT

## 2022-10-30 PROCEDURE — 97530 THERAPEUTIC ACTIVITIES: CPT

## 2022-10-30 PROCEDURE — 97165 OT EVAL LOW COMPLEX 30 MIN: CPT

## 2022-10-30 PROCEDURE — 6370000000 HC RX 637 (ALT 250 FOR IP): Performed by: HOSPITALIST

## 2022-10-30 PROCEDURE — 80053 COMPREHEN METABOLIC PANEL: CPT

## 2022-10-30 PROCEDURE — 85025 COMPLETE CBC W/AUTO DIFF WBC: CPT

## 2022-10-30 PROCEDURE — 1100000000 HC RM PRIVATE

## 2022-10-30 PROCEDURE — 2580000003 HC RX 258: Performed by: HOSPITALIST

## 2022-10-30 PROCEDURE — 82962 GLUCOSE BLOOD TEST: CPT

## 2022-10-30 PROCEDURE — 36415 COLL VENOUS BLD VENIPUNCTURE: CPT

## 2022-10-30 RX ORDER — CETIRIZINE HYDROCHLORIDE 10 MG/1
10 TABLET ORAL DAILY
Status: DISCONTINUED | OUTPATIENT
Start: 2022-10-31 | End: 2022-11-02 | Stop reason: HOSPADM

## 2022-10-30 RX ORDER — SACCHAROMYCES BOULARDII 250 MG
250 CAPSULE ORAL 2 TIMES DAILY
Status: DISCONTINUED | OUTPATIENT
Start: 2022-10-30 | End: 2022-11-02 | Stop reason: HOSPADM

## 2022-10-30 RX ORDER — ESTRADIOL 1 MG/1
0.5 TABLET ORAL DAILY
Status: DISCONTINUED | OUTPATIENT
Start: 2022-10-31 | End: 2022-11-02 | Stop reason: HOSPADM

## 2022-10-30 RX ORDER — TIZANIDINE 2 MG/1
4 TABLET ORAL EVERY 6 HOURS PRN
Status: DISCONTINUED | OUTPATIENT
Start: 2022-10-30 | End: 2022-11-02 | Stop reason: HOSPADM

## 2022-10-30 RX ORDER — PANTOPRAZOLE SODIUM 40 MG/1
40 TABLET, DELAYED RELEASE ORAL
Status: DISCONTINUED | OUTPATIENT
Start: 2022-10-31 | End: 2022-11-02 | Stop reason: HOSPADM

## 2022-10-30 RX ORDER — HYDRALAZINE HYDROCHLORIDE 25 MG/1
25 TABLET, FILM COATED ORAL EVERY 8 HOURS SCHEDULED
Status: DISCONTINUED | OUTPATIENT
Start: 2022-10-30 | End: 2022-11-02 | Stop reason: HOSPADM

## 2022-10-30 RX ADMIN — SODIUM CHLORIDE, PRESERVATIVE FREE 10 ML: 5 INJECTION INTRAVENOUS at 21:34

## 2022-10-30 RX ADMIN — DOXYCYCLINE HYCLATE 100 MG: 100 CAPSULE ORAL at 21:32

## 2022-10-30 RX ADMIN — Medication 250 MG: at 17:53

## 2022-10-30 RX ADMIN — GABAPENTIN 300 MG: 300 CAPSULE ORAL at 15:51

## 2022-10-30 RX ADMIN — TRAZODONE HYDROCHLORIDE 50 MG: 50 TABLET ORAL at 21:32

## 2022-10-30 RX ADMIN — CEFTRIAXONE 1000 MG: 1 INJECTION, POWDER, FOR SOLUTION INTRAMUSCULAR; INTRAVENOUS at 02:36

## 2022-10-30 RX ADMIN — DULOXETINE HYDROCHLORIDE 30 MG: 30 CAPSULE, DELAYED RELEASE ORAL at 21:32

## 2022-10-30 RX ADMIN — HYDRALAZINE HYDROCHLORIDE 25 MG: 25 TABLET, FILM COATED ORAL at 15:51

## 2022-10-30 RX ADMIN — HYDRALAZINE HYDROCHLORIDE 25 MG: 25 TABLET, FILM COATED ORAL at 21:33

## 2022-10-30 RX ADMIN — TOPIRAMATE 100 MG: 100 TABLET, FILM COATED ORAL at 10:08

## 2022-10-30 RX ADMIN — CARVEDILOL 12.5 MG: 6.25 TABLET, FILM COATED ORAL at 10:08

## 2022-10-30 RX ADMIN — TIZANIDINE 4 MG: 2 TABLET ORAL at 21:31

## 2022-10-30 RX ADMIN — TIZANIDINE 4 MG: 2 TABLET ORAL at 15:57

## 2022-10-30 RX ADMIN — TOPIRAMATE 100 MG: 100 TABLET, FILM COATED ORAL at 21:33

## 2022-10-30 RX ADMIN — Medication 400 MG: at 10:08

## 2022-10-30 RX ADMIN — Medication 400 MG: at 21:33

## 2022-10-30 RX ADMIN — DULOXETINE HYDROCHLORIDE 60 MG: 60 CAPSULE, DELAYED RELEASE ORAL at 10:08

## 2022-10-30 RX ADMIN — DOXYCYCLINE HYCLATE 100 MG: 100 CAPSULE ORAL at 10:08

## 2022-10-30 RX ADMIN — HYDROCODONE BITARTRATE AND ACETAMINOPHEN 1 TABLET: 5; 325 TABLET ORAL at 18:37

## 2022-10-30 RX ADMIN — ENOXAPARIN SODIUM 40 MG: 100 INJECTION SUBCUTANEOUS at 10:10

## 2022-10-30 RX ADMIN — CARVEDILOL 12.5 MG: 6.25 TABLET, FILM COATED ORAL at 21:32

## 2022-10-30 RX ADMIN — GABAPENTIN 300 MG: 300 CAPSULE ORAL at 10:07

## 2022-10-30 RX ADMIN — GABAPENTIN 300 MG: 300 CAPSULE ORAL at 21:33

## 2022-10-30 RX ADMIN — HYDROCODONE BITARTRATE AND ACETAMINOPHEN 1 TABLET: 5; 325 TABLET ORAL at 10:44

## 2022-10-30 ASSESSMENT — PAIN DESCRIPTION - DESCRIPTORS: DESCRIPTORS: CRAMPING;SPASM

## 2022-10-30 ASSESSMENT — PAIN DESCRIPTION - LOCATION
LOCATION: BACK;NECK
LOCATION: BACK;LEG
LOCATION: ABDOMEN;BACK

## 2022-10-30 ASSESSMENT — PAIN SCALES - GENERAL
PAINLEVEL_OUTOF10: 10
PAINLEVEL_OUTOF10: 3
PAINLEVEL_OUTOF10: 8
PAINLEVEL_OUTOF10: 10
PAINLEVEL_OUTOF10: 2

## 2022-10-30 NOTE — PROGRESS NOTES
Hospitalist Progress Note   Admit Date:  10/28/2022 10:59 PM   Name:  Bobby Ascencio   Age:  69 y.o. Sex:  female  :  1955   MRN:  [id number]   Room:  [facility]/    Presenting Complaint: Altered Mental Status (Was sepsis/ possible uti / bed bound for 3 days not norm)     Reason(s) for Admission: Metabolic encephalopathy [D94.18]  Pneumonia due to organism [J18.9]  JOSE (acute kidney injury) ([address] [N17.9]  Pneumonia of left lower lobe due to infectious organism [J18.9]  Leukocytosis, unspecified type [facility] Course:       Ms. Jolene Solis is a 68 yo female with PMH of GBS bacteremia 10,2022 completed amoxil 10-20-22, debility, HTN, DM2, pAFIB, CVA who is evaluated with weakness/ acute encephalopathy. Found with left base pneumonia. CT head negative. UA positive. Cx skin teodoro. She is on course of IV antibiotics. Blood cultures pending. She had JOSE- resolved after being hydrated. Mentation has improved. Declined SLP evaluation. Discharge plans pending. Might want to return to SNF        Subjective & 24hr Events (10/30/22): Eating, on room air, no dyspnea or cough, some loose BM, feels like she has word finding issues, has chronic pain      Assessment & Plan:     Principal Problem:    Pneumonia due to organism  Plan:   Wean O2 as tolerant- currently on room air   D2 rocephin and doxycycline  Followup blood cultures   Consulted SLP        Active Problems:    Chronic pain syndrome  Plan:   Norco as needed        Metabolic encephalopathy  Plan:   Seems improved  Followup mentation stable          JOSE (acute kidney injury) ([facility]  Plan:   stop IVF  Followup labs resolved       UTI:  D2 rocephin   Negative  culture      Diabetes mellitus type 2, controlled ([facility]  Plan:   SSI      HTN:  Continued coreg  Resume hydralazine        Depression:  Continued chronic meds     Anemia:  Trend HGB      Anticipated discharge needs:      Pending     Diet:  ADULT DIET;  Regular  DVT PPx: lovenox  Code status: Full Code    Hospital Problems:  Principal Problem:    Pneumonia due to organism  Active Problems:    Chronic pain syndrome    Metabolic encephalopathy    Pneumonia of left lower lobe due to infectious organism    JOSE (acute kidney injury) ([facility]    Diabetes mellitus type 2, controlled ([facility]  Resolved Problems:    Leukocytosis      Objective:   Patient Vitals for the past 24 hrs:   Temp Pulse Resp BP SpO2   10/30/22 1140 98.4 °F (36.9 °C) 61 18 (!) 142/90 98 %   10/30/22 1044 -- -- 18 -- --   10/30/22 0814 98.4 °F (36.9 °C) 69 16 (!) 165/80 96 %   10/30/22 0335 98.1 °F (36.7 °C) 66 17 (!) 149/67 97 %   10/29/22 2351 98.1 °F (36.7 °C) 69 19 (!) 124/51 95 %   10/29/22 2217 -- -- 14 -- --   10/29/22 2146 -- -- -- (!) 144/69 --   10/29/22 2117 -- 68 16 -- 98 %   10/29/22 2020 98.8 °F (37.1 °C) 53 17 (!) 144/69 97 %   10/29/22 1622 -- -- 19 -- --   10/29/22 1549 98.4 °F (36.9 °C) 72 18 (!) 147/49 100 %       Oxygen Therapy  SpO2: 98 %  Pulse Oximeter Device Mode: Intermittent  Pulse Oximeter Device Location: Left, Finger  O2 Device: None (Room air)  O2 Flow Rate (L/min): 3 L/min    Estimated body mass index is 31.32 kg/m² as calculated from the following:    Height as of this encounter: 5' 7\" (1.702 m). Weight as of this encounter: 200 lb (90.7 kg). No intake or output data in the 24 hours ending 10/30/22 1532      Physical Exam:     Blood pressure (!) 142/90, pulse 61, temperature 98.4 °F (36.9 °C), temperature source Oral, resp. rate 18, height 5' 7\" (1.702 m), weight 200 lb (90.7 kg), SpO2 98 %. General:    Well nourished. Elderly, no distress , alert, pleasant   CV:   RRR. III/VI KARISSA LUSB  No jugular venous distension. No edema   Lungs:   CTAB. No wheezing, rhonchi, or rales. Symmetric expansion. Anterior   Abdomen: Bowel sounds present. Soft, nontender, nondistended. Extremities: No cyanosis or clubbing. No edema  Skin:     No rashes and normal coloration. Warm and dry. Neuro:  A and O x 3  Psych:  Flat affect.       I have personally reviewed labs and tests showing:  Recent Labs:  Recent Results (from the past 48 hour(s))   Lactic Acid    Collection Time: 10/28/22 11:50 PM   Result Value Ref Range    Lactic Acid, Plasma 1.0 0.4 - 2.0 MMOL/L   CBC with Auto Differential    Collection Time: 10/28/22 11:51 PM   Result Value Ref Range    WBC 16.8 (H) 4.3 - 11.1 K/uL    RBC 3.63 (L) 4.05 - 5.2 M/uL    Hemoglobin 10.8 (L) 11.7 - 15.4 g/dL    Hematocrit 35.0 (L) 35.8 - 46.3 %    MCV 96.4 82.0 - 102.0 FL    MCH 29.8 26.1 - 32.9 PG    MCHC 30.9 (L) 31.4 - 35.0 g/dL    RDW 13.7 11.9 - 14.6 %    Platelets 684 315 - 156 K/uL    MPV 9.8 9.4 - 12.3 FL    nRBC 0.00 0.0 - 0.2 K/uL    Differential Type AUTOMATED      Seg Neutrophils 70 43 - 78 %    Lymphocytes 19 13 - 44 %    Monocytes 7 4.0 - 12.0 %    Eosinophils % 3 0.5 - 7.8 %    Basophils 1 0.0 - 2.0 %    Immature Granulocytes 1 0.0 - 5.0 %    Segs Absolute 11.7 (H) 1.7 - 8.2 K/UL    Absolute Lymph # 3.2 0.5 - 4.6 K/UL    Absolute Mono # 1.1 0.1 - 1.3 K/UL    Absolute Eos # 0.6 0.0 - 0.8 K/UL    Basophils Absolute 0.1 0.0 - 0.2 K/UL    Absolute Immature Granulocyte 0.2 0.0 - 0.5 K/UL   CMP    Collection Time: 10/28/22 11:51 PM   Result Value Ref Range    Sodium 135 133 - 143 mmol/L    Potassium 4.1 3.5 - 5.1 mmol/L    Chloride 105 101 - 110 mmol/L    CO2 21 21 - 32 mmol/L    Anion Gap 9 2 - 11 mmol/L    Glucose 156 (H) 65 - 100 mg/dL    BUN 50 (H) 8 - 23 MG/DL    Creatinine 2.00 (H) 0.6 - 1.0 MG/DL    Est, Glom Filt Rate 27 (L) >60 ml/min/1.73m2    Calcium 8.9 8.3 - 10.4 MG/DL    Total Bilirubin 0.2 0.2 - 1.1 MG/DL    ALT 17 12 - 65 U/L    AST 23 15 - 37 U/L    Alk Phosphatase 77 50 - 136 U/L    Total Protein 8.2 6.3 - 8.2 g/dL    Albumin 2.3 (L) 3.2 - 4.6 g/dL    Globulin 5.9 (H) 2.8 - 4.5 g/dL    Albumin/Globulin Ratio 0.4 0.4 - 1.6     Troponin    Collection Time: 10/28/22 11:51 PM   Result Value Ref Range    Troponin, High Sensitivity 25. 4 (H) 0 - 14 pg/mL   Acetaminophen Level    Collection Time: 10/28/22 11:51 PM   Result Value Ref Range    Acetaminophen Level <2 (L) 10 - 30 ug/mL   Salicylate    Collection Time: 10/28/22 11:51 PM   Result Value Ref Range    Salicylate, Serum <3.8 (L) 2.8 - 20.0 MG/DL   Lipase    Collection Time: 10/28/22 11:51 PM   Result Value Ref Range    Lipase 34 (L) 73 - 393 U/L   Urinalysis    Collection Time: 10/29/22  1:58 AM   Result Value Ref Range    Color, UA DARK YELLOW      Appearance CLOUDY      Specific Gravity, UA 1.026 (H) 1.001 - 1.023      pH, Urine 5.0 5.0 - 9.0      Protein, UA 30 (A) NEG mg/dL    Glucose, UA Negative mg/dL    Ketones, Urine TRACE (A) NEG mg/dL    Bilirubin Urine MODERATE (A) NEG      Blood, Urine Negative NEG      Urobilinogen, Urine 0.2 0.2 - 1.0 EU/dL    Nitrite, Urine Negative NEG      Leukocyte Esterase, Urine TRACE (A) NEG     Urinalysis, Micro    Collection Time: 10/29/22  1:58 AM   Result Value Ref Range    WBC, UA 0-3 0 /hpf    RBC, UA 0-3 0 /hpf    Epithelial Cells UA 5-10 0 /hpf    BACTERIA, URINE 2+ (H) 0 /hpf    Casts 0 0 /lpf    Crystals 0 0 /LPF    Mucus, UA 0 0 /lpf   Culture, Urine    Collection Time: 10/29/22  1:58 AM    Specimen: Urine, clean catch   Result Value Ref Range    Special Requests NO SPECIAL REQUESTS      Culture        10,000 to 50,000 COLONIES/mL MIXED SKIN QI ISOLATED   POCT Glucose    Collection Time: 10/29/22  6:07 AM   Result Value Ref Range    POC Glucose 148 (H) 65 - 100 mg/dL    Performed by: Tahira    POCT Glucose    Collection Time: 10/29/22 12:11 PM   Result Value Ref Range    POC Glucose 189 (H) 65 - 100 mg/dL    Performed by: StephytiaPCT    POCT Glucose    Collection Time: 10/29/22  4:32 PM   Result Value Ref Range    POC Glucose 237 (H) 65 - 100 mg/dL    Performed by: MarizaAreshayyntiaPCT    POCT Glucose    Collection Time: 10/29/22  9:05 PM   Result Value Ref Range    POC Glucose 201 (H) 65 - 100 mg/dL    Performed by: Nikki    Comprehensive Metabolic Panel w/ Reflex to MG    Collection Time: 10/30/22  4:31 AM   Result Value Ref Range    Sodium 139 133 - 143 mmol/L    Potassium 3.8 3.5 - 5.1 mmol/L    Chloride 110 101 - 110 mmol/L    CO2 24 21 - 32 mmol/L    Anion Gap 5 2 - 11 mmol/L    Glucose 178 (H) 65 - 100 mg/dL    BUN 20 8 - 23 MG/DL    Creatinine 0.57 (L) 0.6 - 1.0 MG/DL    Est, Glom Filt Rate >60 >60 ml/min/1.73m2    Calcium 8.8 8.3 - 10.4 MG/DL    Total Bilirubin 0.2 0.2 - 1.1 MG/DL    ALT 14 12 - 65 U/L    AST 16 15 - 37 U/L    Alk Phosphatase 65 50 - 136 U/L    Total Protein 6.8 6.3 - 8.2 g/dL    Albumin 1.9 (L) 3.2 - 4.6 g/dL    Globulin 4.9 (H) 2.8 - 4.5 g/dL    Albumin/Globulin Ratio 0.4 0.4 - 1.6     CBC with Auto Differential    Collection Time: 10/30/22  4:31 AM   Result Value Ref Range    WBC 9.2 4.3 - 11.1 K/uL    RBC 3.15 (L) 4.05 - 5.2 M/uL    Hemoglobin 9.2 (L) 11.7 - 15.4 g/dL    Hematocrit 29.5 (L) 35.8 - 46.3 %    MCV 93.7 82.0 - 102.0 FL    MCH 29.2 26.1 - 32.9 PG    MCHC 31.2 (L) 31.4 - 35.0 g/dL    RDW 13.6 11.9 - 14.6 %    Platelets 396 517 - 013 K/uL    MPV 9.5 9.4 - 12.3 FL    nRBC 0.00 0.0 - 0.2 K/uL    Differential Type AUTOMATED      Seg Neutrophils 62 43 - 78 %    Lymphocytes 24 13 - 44 %    Monocytes 9 4.0 - 12.0 %    Eosinophils % 4 0.5 - 7.8 %    Basophils 1 0.0 - 2.0 %    Immature Granulocytes 1 0.0 - 5.0 %    Segs Absolute 5.6 1.7 - 8.2 K/UL    Absolute Lymph # 2.2 0.5 - 4.6 K/UL    Absolute Mono # 0.8 0.1 - 1.3 K/UL    Absolute Eos # 0.4 0.0 - 0.8 K/UL    Basophils Absolute 0.1 0.0 - 0.2 K/UL    Absolute Immature Granulocyte 0.1 0.0 - 0.5 K/UL   POCT Glucose    Collection Time: 10/30/22  6:21 AM   Result Value Ref Range    POC Glucose 184 (H) 65 - 100 mg/dL    Performed by: Nikki    POCT Glucose    Collection Time: 10/30/22 11:44 AM   Result Value Ref Range    POC Glucose 198 (H) 65 - 100 mg/dL    Performed by: Gina        I have personally Oral BID    DULoxetine (CYMBALTA) extended release capsule 60 mg  60 mg Oral Daily    And    DULoxetine (CYMBALTA) extended release capsule 30 mg  30 mg Oral Nightly    HYDROcodone-acetaminophen (NORCO) 5-325 MG per tablet 1 tablet  1 tablet Oral Q6H PRN    tuberculin injection 5 Units  5 Units IntraDERmal Once       Signed:  Citlali Ling MD    Part of this note may have been written by using a voice dictation software. The note has been proof read but may still contain some grammatical/other typographical errors.

## 2022-10-30 NOTE — CARE COORDINATION
69 yr-old MF with pneumonia. Previously admitted 10/4 and on 10/11 went to [facility]. Per spouse she was d/c from [facility] on 10/25. She is currently requiring assistance and at times confused. Spoke with spouse Margo Elias [phone]) regarding need for supervision/assistance at d/c - he says he works most days and their three daughters are unavailable to help. We discussed STR and referrals have been made in [facility] to [facility], [facility], [facility] and [facility] and faxed to [address] [place] [address]. On Monday SW will contact [address] [place] [address] to make referral.  If unable to find a SNF spouse said he will quit work for a while to stay at home with her. Referral was made to [facility]. SW to follow-up with spouse Margo Elias [phone]).

## 2022-10-30 NOTE — PROGRESS NOTES
[employer] AM-PAC 6 Clicks Daily Activity Inpatient Short Form:    AM-PAC Daily Activity Inpatient   How much help for putting on and taking off regular lower body clothing?: A Lot  How much help for Bathing?: A Lot  How much help for Toileting?: A Lot  How much help for putting on and taking off regular upper body clothing?: A Little  How much help for taking care of personal grooming?: A Little  How much help for eating meals?: None  AM-[facility] Inpatient Daily Activity Raw Score: 16  AM-PAC Inpatient ADL T-Scale Score : 35.96  ADL Inpatient CMS 0-100% Score: 53.32  ADL Inpatient CMS G-Code Modifier : CK           SUBJECTIVE:     Ms. Jyoti Finney states, \"I think I need to be changed. \"     Social/Functional Lives With: Spouse (facility staff)  Type of Home: House  Home Layout: Two level (pt has stair lift)  Home Access: Stairs to enter with rails  Entrance Stairs - Number of Steps: 7  Entrance Stairs - Rails: Left  Ambulation Assistance: Needs assistance  Transfer Assistance: Needs assistance  Additional Comments: pt was recently in [facility] for rehab until this admission    OBJECTIVE:     HAILEY / Colten Drea / AIRWAY: IV    RESTRICTIONS/PRECAUTIONS:  Restrictions/Precautions: Fall Risk    PAIN: VITALS / O2:   Pre Treatment: Patient did not report pain. Post Treatment:        Vitals          Oxygen            GROSS EVALUATION: INTACT IMPAIRED   (See Comments)   UE AROM [x] []WFL   UE PROM [] []   Strength [x]  WFL     Posture / Balance []  Decreased yet functional    Sensation []     Coordination []  Decreased yet functional      Tone []       Edema []    Activity Tolerance [] Patient limited by fatigue, Treatment limited secondary to decreased cognition     Hand Dominance R [] L []      COGNITION/  PERCEPTION: INTACT IMPAIRED   (See Comments)   Orientation []     Vision [x]     Hearing [x]     Cognition  [] Following Commands:  Follows one step commands with increased time  Initiation: Requires cues for some  Sequencing: Requires cues for some   Perception []       MOBILITY: I Mod I S SBA CGA Min Mod Max Total  NT x2 Comments:   Bed Mobility    Rolling [] [] [] [] [] [x] [] [] [] [] []    Supine to Sit [] [] [] [] [] [x] [] [] [] [] []    Scooting [] [] [] [] [] [x] [] [] [] [] []    Sit to Supine [] [] [] [] [] [] [] [] [] [] []    Transfers    Sit to Stand [] [] [] [] [] [x] [] [] [] [] [] With RW    Bed to Chair [] [] [] [] [x] [] [] [] [] [] []    Stand to Sit [] [] [] [] [] [x] [] [] [] [] []    Tub/Shower [] [] [] [] [] [] [] [] [] [] []     Toilet [] [] [] [] [] [] [] [] [] [] []      [] [] [] [] [] [] [] [] [] [] []    I=Independent, Mod I=Modified Independent, S=Supervision/Setup, SBA=Standby Assistance, CGA=Contact Guard Assistance, Min=Minimal Assistance, Mod=Moderate Assistance, Max=Maximal Assistance, Total=Total Assistance, NT=Not Tested    ACTIVITIES OF DAILY LIVING: I Mod I S SBA CGA Min Mod Max Total NT Comments   BASIC ADLs:              Upper Body Bathing  [] [] [] [] [] [] [] [] [] []    Lower Body Bathing [] [] [] [] [] [] [] [] [] []    Toileting [] [] [] [] [] [] [x] [] [] [] Needed assistance with clothing management    Upper Body Dressing [] [] [] [] [] [] [] [] [] []    Lower Body Dressing [] [] [] [] [] [] [x] [] [] [] Donned brief    Feeding [] [] [] [] [] [] [] [] [] []    Grooming [] [] [] [] [] [] [] [] [] []    Personal Device Care [] [] [] [] [] [] [] [] [] []    Functional Mobility [] [] [] [] [x] [x] [] [] [] [] With RW    I=Independent, Mod I=Modified Independent, S=Supervision/Setup, SBA=Standby Assistance, CGA=Contact Guard Assistance, Min=Minimal Assistance, Mod=Moderate Assistance, Max=Maximal Assistance, Total=Total Assistance, NT=Not Tested    PLAN:   FREQUENCY/DURATION   OT Plan of Care: 3 times/week for duration of hospital stay or until stated goals are met, whichever comes first.    PROBLEM LIST:   (Skilled intervention is medically necessary to address:)  Decreased ADL/Functional Activities  Decreased Activity Tolerance  Decreased Balance  Decreased Cognition  Decreased Safety Awareness   INTERVENTIONS PLANNED:  (Benefits and precautions of occupational therapy have been discussed with the patient.)  Self Care Training  Therapeutic Activity  Therapeutic Exercise/HEP  Neuromuscular Re-education  Education         TREATMENT:     EVALUATION: LOW COMPLEXITY: (Untimed Charge)    TREATMENT:   Self Care (25 minutes): Patient participated in lower body bathing, toileting, and lower body dressing ADLs in unsupported sitting and standing with moderate visual, verbal, and tactile cueing to increase independence, increase activity tolerance, and increase safety awareness. Patient also participated in functional mobility and functional transfer training to increase independence, increase activity tolerance, and increase safety awareness.      TREATMENT GRID:  N/A    AFTER TREATMENT PRECAUTIONS: Bed/Chair Locked, Call light within reach, Chair, Heels floated, and Needs within reach    INTERDISCIPLINARY COLLABORATION:  RN/ PCT, PT/ PTA, and OT/ CASTILLO    EDUCATION:  Education Given To: Patient  Education Provided: Role of Therapy    TOTAL TREATMENT DURATION AND TIME:  Time In: 0935  Time Out: [facility]  Minutes: 30    Dex Sandoval OT

## 2022-10-30 NOTE — PROGRESS NOTES
ACUTE PHYSICAL THERAPY GOALS:   (Developed with and agreed upon by patient and/or caregiver.)  DISCHARGE GOALS :  (1.)Ms. Jody Domínguez will move from supine to sit and sit to supine  with CONTACT GUARD ASSIST with HOB 20 deg using rail. (2.)Ms. Jody Domínguez will transfer from bed to chair and chair to bed with MINIMAL ASSIST using walker. (3.)Ms. Jody Domínguez will ambulate with MINIMAL ASSIST for 40-50 feet with rolling walker. ________________________________________________________________________________________________     PHYSICAL THERAPY Initial Assessment and PM  (Link to Caseload Tracking: PT Visit Days : 2  Acknowledge Orders  Time In/Out  PT Charge Capture  Rehab Caseload Tracker    Jamal Tolliver is a 69 y.o. female   PRIMARY DIAGNOSIS: Pneumonia due to organism  Metabolic encephalopathy [P39.40]  Pneumonia due to organism [J18.9]  JOSE (acute kidney injury) ([facility] [N17.9]  Pneumonia of left lower lobe due to infectious organism [J18.9]  Leukocytosis, unspecified type [D72.829]       Reason for Referral: Generalized Muscle Weakness (M62.81)  Other lack of cordination (R27.8)  Difficulty in walking, Not elsewhere classified (R26.2)  Other abnormalities of gait and mobility (R26.89)  Inpatient: Payor: MEDICARE / Plan: MEDICARE PART A AND B / Product Type: *No Product type* /     ASSESSMENT:     REHAB RECOMMENDATIONS:   Recommendation to date pending progress:  Setting:  Home Health Therapy  Pt needs 24/7 care , completely unsafe to be left alone    Equipment:    To Be Determined     ASSESSMENT:  Ms. Jody Domínguez presented as alert & oriented to person, place, time & situation but pt was slow to process cues & did perseverate during activity. This pt is significantly limited functionally & is unable to return home without 24/7 help. This pt will benefit from follow up therapy while in house & will also need follow up [address] on hospital DC.  This pt is currently 02 dependent on 4 liters, with tendency to drop with activity, needing cues to deep breathe to recover. MSW aware of DC concerns & needs. 10/30--pt supine on arrival.  Pt benefits from extra time and encouragement to perform mobility activities. Pt's brief, gown, and socks saturated in what appears to be urine. Pt assisted supine to sit to stand. Pt assisted with changing of gown, brief, and socks. Pt took steps to bedside chair. Pt in bedside chair with needs in reach. [facility] [address] [address] AM-PAC 6 Clicks Basic Mobility Inpatient Short Form  AM-PAC Mobility Inpatient   How much difficulty turning over in bed?: A Little  How much difficulty sitting down on / standing up from a chair with arms?: A Lot  How much difficulty moving from lying on back to sitting on side of bed?: A Little  How much help from another person moving to and from a bed to a chair?: A Lot  How much help from another person needed to walk in hospital room?: A Lot  How much help from another person for climbing 3-5 steps with a railing?: Total  AM-PAC Inpatient Mobility Raw Score : 13  AM-PAC Inpatient T-Scale Score : 36.74  Mobility Inpatient CMS 0-100% Score: 64.91  Mobility Inpatient CMS G-Code Modifier : CL    SUBJECTIVE:   Ms. Boyd Sic agreeable to therapy with encouragement.      Social/Functional Lives With: Spouse (facility staff)  Type of Home: House  Home Layout: Two level (pt has stair lift)  Home Access: Stairs to enter with rails  Entrance Stairs - Number of Steps: 7  Entrance Stairs - Rails: Left  Ambulation Assistance: Needs assistance  Transfer Assistance: Needs assistance  Additional Comments: pt was recently in [address] [facility] for rehab until this admission    OBJECTIVE:     PAIN: VITALS / O2: PRECAUTION / Tiffany Smiling / DRAINS:   Pre Treatment:   Pain Assessment: 0-10  Pain Level: 3 with movement      Post Treatment: no pain Vitals NT       Oxygen 94% on 4 liters pre exertion , 88% post exertion with ~60sec to recover to 93% on 4 liters      IV and 02 support    RESTRICTIONS/PRECAUTIONS:  Restrictions/Precautions: Fall Risk                 GROSS EVALUATION: Intact Impaired (Comments):   AROM []  Decreased & non-functional   PROM []    Strength []  Decreased & non-functional   Balance []  Decreased & non-functional   Posture [] N/A   Sensation [x]  grossly   Coordination []   Decreased & non-functional   Tone [x]     Edema []    Activity Tolerance [x]  poor    []      COGNITION/  PERCEPTION: Intact Impaired (Comments):   Orientation [x]  X 4   Vision [x]     Hearing [x]     Cognition  []  Slow to process, word find disorder, perseverates during activity     MOBILITY: I Mod I S SBA CGA Min Mod Max Total  NT x2 Comments:   Bed Mobility    Rolling [] [] [] [] [] [x] [] [] [] [] []    Supine to Sit [] [] [] [] [] [x] [] [] [] [] []    Scooting [] [] [] [] [] [x] [] [] [] [] []    Sit to Supine [] [] [] [] [] [x] [] [] [] [] []    Transfers    Sit to Stand [] [] [] [] [] [x] [] [] [] [] [x] With walker   Bed to Chair [] [] [] [] [] [] [] [] [] [] []    Stand to Sit [] [] [] [] [] [x] [] [] [] [] [x]     [] [] [] [] [] [] [] [] [] [] []    I=Independent, Mod I=Modified Independent, S=Supervision, SBA=Standby Assistance, CGA=Contact Guard Assistance,   Min=Minimal Assistance, Mod=Moderate Assistance, Max=Maximal Assistance, Total=Total Assistance, NT=Not Tested    GAIT: I Mod I S SBA CGA Min Mod Max Total  NT x2 Comments:   Level of Assistance [] [] [] [] [] [x] [] [] [] [] [x]    Distance 10     DME Rolling Walker    Gait Quality Decreased michelle , Decreased step clearance, Decreased step length, and Trunk sway increased    Weightbearing Status      Stairs NT     I=Independent, Mod I=Modified Independent, S=Supervision, SBA=Standby Assistance, CGA=Contact Guard Assistance,   Min=Minimal Assistance, Mod=Moderate Assistance, Max=Maximal Assistance, Total=Total Assistance, NT=Not Tested    PLAN:   FREQUENCY AND DURATION: Daily for duration of hospital stay or until stated goals are met, whichever comes first.    THERAPY PROGNOSIS: Fair    PROBLEM LIST:   (Skilled intervention is medically necessary to address:)  Decreased ADL/Functional Activities  Decreased Activity Tolerance  Decreased AROM/PROM  Decreased Balance  Decreased Cognition  Decreased Coordination  Decreased Gait Ability  Decreased Safety Awareness  Decreased Strength  Decreased Transfer Abilities INTERVENTIONS PLANNED:   (Benefits and precautions of physical therapy have been discussed with the patient.)  Therapeutic Activity  Therapeutic Exercise/HEP  Gait Training  Education       TREATMENT:   EVALUATION: HIGH COMPLEXITY: (Untimed Charge)    TREATMENT:   Therapeutic Activity (45 Minutes): Therapeutic activity included Supine to Sit, Transfer Training, Ambulation on level ground, Sitting balance , and Standing balance to improve functional Activity tolerance, Balance, Coordination, Mobility, Strength, and ROM. TREATMENT GRID:  N/A    AFTER TREATMENT PRECAUTIONS: Alarm Activated, Bed, Bed/Chair Locked, Call light within reach, Needs within reach, and RN notified    INTERDISCIPLINARY COLLABORATION:  RN/ PCT and RN Case Manager/      EDUCATION: Education Given To: Patient; Family  Education Provided: Role of Therapy;Plan of Care;Transfer Training  Education Method: Demonstration;Verbal  Education Outcome: Verbalized understanding;Demonstrated understanding    TIME IN/OUT:  Time In: 0930  Time Out: [address] [place] [address]  Minutes: [place], PT

## 2022-10-31 ENCOUNTER — APPOINTMENT (OUTPATIENT)
Dept: GENERAL RADIOLOGY | Age: 67
DRG: 193 | End: 2022-10-31
Payer: MEDICARE

## 2022-10-31 LAB
ANION GAP SERPL CALC-SCNC: 7 MMOL/L (ref 2–11)
BACTERIA SPEC CULT: NORMAL
BASOPHILS # BLD: 0.1 K/UL (ref 0–0.2)
BASOPHILS NFR BLD: 1 % (ref 0–2)
BUN SERPL-MCNC: 11 MG/DL (ref 8–23)
CALCIUM SERPL-MCNC: 9 MG/DL (ref 8.3–10.4)
CHLORIDE SERPL-SCNC: 107 MMOL/L (ref 101–110)
CO2 SERPL-SCNC: 24 MMOL/L (ref 21–32)
CREAT SERPL-MCNC: 0.52 MG/DL (ref 0.6–1)
DIFFERENTIAL METHOD BLD: ABNORMAL
EOSINOPHIL # BLD: 0.3 K/UL (ref 0–0.8)
EOSINOPHIL NFR BLD: 4 % (ref 0.5–7.8)
ERYTHROCYTE [DISTWIDTH] IN BLOOD BY AUTOMATED COUNT: 13.6 % (ref 11.9–14.6)
GLUCOSE BLD STRIP.AUTO-MCNC: 160 MG/DL (ref 65–100)
GLUCOSE BLD STRIP.AUTO-MCNC: 181 MG/DL (ref 65–100)
GLUCOSE BLD STRIP.AUTO-MCNC: 189 MG/DL (ref 65–100)
GLUCOSE BLD STRIP.AUTO-MCNC: 199 MG/DL (ref 65–100)
GLUCOSE BLD STRIP.AUTO-MCNC: 200 MG/DL (ref 65–100)
GLUCOSE SERPL-MCNC: 181 MG/DL (ref 65–100)
HCT VFR BLD AUTO: 31.7 % (ref 35.8–46.3)
HGB BLD-MCNC: 10.1 G/DL (ref 11.7–15.4)
IMM GRANULOCYTES # BLD AUTO: 0.3 K/UL (ref 0–0.5)
IMM GRANULOCYTES NFR BLD AUTO: 3 % (ref 0–5)
LYMPHOCYTES # BLD: 2.4 K/UL (ref 0.5–4.6)
LYMPHOCYTES NFR BLD: 28 % (ref 13–44)
MCH RBC QN AUTO: 29.4 PG (ref 26.1–32.9)
MCHC RBC AUTO-ENTMCNC: 31.9 G/DL (ref 31.4–35)
MCV RBC AUTO: 92.2 FL (ref 82–102)
MONOCYTES # BLD: 0.8 K/UL (ref 0.1–1.3)
MONOCYTES NFR BLD: 9 % (ref 4–12)
NEUTS SEG # BLD: 4.7 K/UL (ref 1.7–8.2)
NEUTS SEG NFR BLD: 55 % (ref 43–78)
NRBC # BLD: 0 K/UL (ref 0–0.2)
PLATELET # BLD AUTO: 300 K/UL (ref 150–450)
PMV BLD AUTO: 9.6 FL (ref 9.4–12.3)
POTASSIUM SERPL-SCNC: 3.7 MMOL/L (ref 3.5–5.1)
RBC # BLD AUTO: 3.44 M/UL (ref 4.05–5.2)
SERVICE CMNT-IMP: ABNORMAL
SERVICE CMNT-IMP: NORMAL
SODIUM SERPL-SCNC: 138 MMOL/L (ref 133–143)
WBC # BLD AUTO: 8.6 K/UL (ref 4.3–11.1)

## 2022-10-31 PROCEDURE — 85025 COMPLETE CBC W/AUTO DIFF WBC: CPT

## 2022-10-31 PROCEDURE — 1100000000 HC RM PRIVATE

## 2022-10-31 PROCEDURE — 74018 RADEX ABDOMEN 1 VIEW: CPT

## 2022-10-31 PROCEDURE — 2580000003 HC RX 258: Performed by: HOSPITALIST

## 2022-10-31 PROCEDURE — 80048 BASIC METABOLIC PNL TOTAL CA: CPT

## 2022-10-31 PROCEDURE — 6370000000 HC RX 637 (ALT 250 FOR IP): Performed by: INTERNAL MEDICINE

## 2022-10-31 PROCEDURE — 6360000002 HC RX W HCPCS: Performed by: HOSPITALIST

## 2022-10-31 PROCEDURE — 73502 X-RAY EXAM HIP UNI 2-3 VIEWS: CPT

## 2022-10-31 PROCEDURE — 97530 THERAPEUTIC ACTIVITIES: CPT

## 2022-10-31 PROCEDURE — 82962 GLUCOSE BLOOD TEST: CPT

## 2022-10-31 PROCEDURE — 36415 COLL VENOUS BLD VENIPUNCTURE: CPT

## 2022-10-31 PROCEDURE — 6370000000 HC RX 637 (ALT 250 FOR IP): Performed by: HOSPITALIST

## 2022-10-31 RX ORDER — NYSTATIN 100000 [USP'U]/G
POWDER TOPICAL 2 TIMES DAILY PRN
Status: DISCONTINUED | OUTPATIENT
Start: 2022-10-31 | End: 2022-11-02 | Stop reason: HOSPADM

## 2022-10-31 RX ADMIN — INSULIN LISPRO 2 UNITS: 100 INJECTION, SOLUTION INTRAVENOUS; SUBCUTANEOUS at 11:54

## 2022-10-31 RX ADMIN — TRAZODONE HYDROCHLORIDE 50 MG: 50 TABLET ORAL at 23:20

## 2022-10-31 RX ADMIN — HYDROCODONE BITARTRATE AND ACETAMINOPHEN 1 TABLET: 5; 325 TABLET ORAL at 14:57

## 2022-10-31 RX ADMIN — CEFTRIAXONE 1000 MG: 1 INJECTION, POWDER, FOR SOLUTION INTRAMUSCULAR; INTRAVENOUS at 03:04

## 2022-10-31 RX ADMIN — TIZANIDINE 4 MG: 2 TABLET ORAL at 18:17

## 2022-10-31 RX ADMIN — CARVEDILOL 12.5 MG: 6.25 TABLET, FILM COATED ORAL at 09:55

## 2022-10-31 RX ADMIN — PANTOPRAZOLE SODIUM 40 MG: 40 TABLET, DELAYED RELEASE ORAL at 06:14

## 2022-10-31 RX ADMIN — DOXYCYCLINE HYCLATE 100 MG: 100 CAPSULE ORAL at 09:54

## 2022-10-31 RX ADMIN — HYDROCODONE BITARTRATE AND ACETAMINOPHEN 1 TABLET: 5; 325 TABLET ORAL at 03:16

## 2022-10-31 RX ADMIN — HYDROCODONE BITARTRATE AND ACETAMINOPHEN 1 TABLET: 5; 325 TABLET ORAL at 20:54

## 2022-10-31 RX ADMIN — Medication 250 MG: at 09:53

## 2022-10-31 RX ADMIN — HYDRALAZINE HYDROCHLORIDE 25 MG: 25 TABLET, FILM COATED ORAL at 06:14

## 2022-10-31 RX ADMIN — DOXYCYCLINE HYCLATE 100 MG: 100 CAPSULE ORAL at 20:53

## 2022-10-31 RX ADMIN — TIZANIDINE 4 MG: 2 TABLET ORAL at 06:13

## 2022-10-31 RX ADMIN — Medication 400 MG: at 20:54

## 2022-10-31 RX ADMIN — HYDROCODONE BITARTRATE AND ACETAMINOPHEN 1 TABLET: 5; 325 TABLET ORAL at 09:53

## 2022-10-31 RX ADMIN — ENOXAPARIN SODIUM 40 MG: 100 INJECTION SUBCUTANEOUS at 09:53

## 2022-10-31 RX ADMIN — ESTRADIOL 0.5 MG: 1 TABLET ORAL at 09:53

## 2022-10-31 RX ADMIN — Medication 400 MG: at 09:54

## 2022-10-31 RX ADMIN — NYSTATIN: 100000 POWDER TOPICAL at 16:24

## 2022-10-31 RX ADMIN — GABAPENTIN 300 MG: 300 CAPSULE ORAL at 14:04

## 2022-10-31 RX ADMIN — TIZANIDINE 4 MG: 2 TABLET ORAL at 12:01

## 2022-10-31 RX ADMIN — GABAPENTIN 300 MG: 300 CAPSULE ORAL at 09:54

## 2022-10-31 RX ADMIN — TOPIRAMATE 100 MG: 100 TABLET, FILM COATED ORAL at 20:54

## 2022-10-31 RX ADMIN — SODIUM CHLORIDE, PRESERVATIVE FREE 10 ML: 5 INJECTION INTRAVENOUS at 20:54

## 2022-10-31 RX ADMIN — ONDANSETRON 4 MG: 2 INJECTION INTRAMUSCULAR; INTRAVENOUS at 11:54

## 2022-10-31 RX ADMIN — SODIUM CHLORIDE, PRESERVATIVE FREE 10 ML: 5 INJECTION INTRAVENOUS at 09:55

## 2022-10-31 RX ADMIN — DULOXETINE HYDROCHLORIDE 60 MG: 60 CAPSULE, DELAYED RELEASE ORAL at 09:54

## 2022-10-31 RX ADMIN — GABAPENTIN 300 MG: 300 CAPSULE ORAL at 20:54

## 2022-10-31 RX ADMIN — DULOXETINE HYDROCHLORIDE 30 MG: 30 CAPSULE, DELAYED RELEASE ORAL at 20:53

## 2022-10-31 RX ADMIN — Medication 250 MG: at 20:53

## 2022-10-31 RX ADMIN — TOPIRAMATE 100 MG: 100 TABLET, FILM COATED ORAL at 09:53

## 2022-10-31 RX ADMIN — CETIRIZINE HYDROCHLORIDE 10 MG: 10 TABLET ORAL at 09:54

## 2022-10-31 RX ADMIN — HYDRALAZINE HYDROCHLORIDE 25 MG: 25 TABLET, FILM COATED ORAL at 14:04

## 2022-10-31 ASSESSMENT — PAIN SCALES - GENERAL
PAINLEVEL_OUTOF10: 6
PAINLEVEL_OUTOF10: 2
PAINLEVEL_OUTOF10: 7
PAINLEVEL_OUTOF10: 7
PAINLEVEL_OUTOF10: 8
PAINLEVEL_OUTOF10: 0
PAINLEVEL_OUTOF10: 6
PAINLEVEL_OUTOF10: 6
PAINLEVEL_OUTOF10: 0
PAINLEVEL_OUTOF10: 6
PAINLEVEL_OUTOF10: 0
PAINLEVEL_OUTOF10: 7

## 2022-10-31 ASSESSMENT — PAIN DESCRIPTION - DESCRIPTORS
DESCRIPTORS: ACHING

## 2022-10-31 ASSESSMENT — PAIN DESCRIPTION - LOCATION
LOCATION: BACK
LOCATION: ABDOMEN
LOCATION: ABDOMEN
LOCATION: BACK
LOCATION: ABDOMEN
LOCATION: GENERALIZED

## 2022-10-31 ASSESSMENT — PAIN DESCRIPTION - ORIENTATION
ORIENTATION: MID;LOWER
ORIENTATION: RIGHT
ORIENTATION: MID;RIGHT;LEFT
ORIENTATION: RIGHT;LEFT;MID
ORIENTATION: LOWER
ORIENTATION: RIGHT;LEFT;ANTERIOR;POSTERIOR

## 2022-10-31 ASSESSMENT — PAIN DESCRIPTION - PAIN TYPE: TYPE: CHRONIC PAIN

## 2022-10-31 ASSESSMENT — PAIN DESCRIPTION - FREQUENCY: FREQUENCY: CONTINUOUS

## 2022-10-31 ASSESSMENT — PAIN DESCRIPTION - ONSET: ONSET: ON-GOING

## 2022-10-31 ASSESSMENT — PAIN - FUNCTIONAL ASSESSMENT: PAIN_FUNCTIONAL_ASSESSMENT: PREVENTS OR INTERFERES SOME ACTIVE ACTIVITIES AND ADLS

## 2022-10-31 NOTE — PROGRESS NOTES
Pt states that she has had diarrhea twice today and both stools were watery. Orders noted for c diff and pt made aware of needing to collect a stool specimen. Hat and collection cup placed in bathroom.      Pt has redness noted under left breast. Nystatin ordered by hospitalist.

## 2022-10-31 NOTE — PROGRESS NOTES
ACUTE PHYSICAL THERAPY GOALS:   (Developed with and agreed upon by patient and/or caregiver.)  DISCHARGE GOALS :  (1.)Ms. Rebeka Lopez will move from supine to sit and sit to supine  with CONTACT GUARD ASSIST with HOB 20 deg using rail. (2.)Ms. Rebeka Lopez will transfer from bed to chair and chair to bed with MINIMAL ASSIST using walker. (3.)Ms. Rebeka Lopez will ambulate with MINIMAL ASSIST for 40-50 feet with rolling walker. ________________________________________________________________________________________________     PHYSICAL THERAPY Daily Note and AM  (Link to Caseload Tracking: PT Visit Days : 3  Acknowledge Orders  Time In/Out  PT Charge Capture  Rehab Caseload Tracker    Malou Burt is a 69 y.o. female   PRIMARY DIAGNOSIS: Pneumonia due to organism  Metabolic encephalopathy [Y39.83]  Pneumonia due to organism [J18.9]  JOSE (acute kidney injury) ([facility] [address] [N17.9]  Pneumonia of left lower lobe due to infectious organism [J18.9]  Leukocytosis, unspecified type [D72.829]       Reason for Referral: Generalized Muscle Weakness (M62.81)  Other lack of cordination (R27.8)  Difficulty in walking, Not elsewhere classified (R26.2)  Other abnormalities of gait and mobility (R26.89)  Inpatient: Payor: MEDICARE / Plan: MEDICARE PART A AND B / Product Type: *No Product type* /     ASSESSMENT:     REHAB RECOMMENDATIONS:   Recommendation to date pending progress:  Setting:  Short-term Rehab    Equipment:    To Be Determined     ASSESSMENT:  Ms. Rebeka Lopez presented as alert & oriented to person, place, time & situation but pt was slow to process cues & did perseverate during activity. This pt is significantly limited functionally & is unable to return home without 24/7 help. This pt will benefit from follow up therapy while in house & will also need follow up [address] [place] [address]. This pt is currently 02 dependent on 4 liters, with tendency to drop with activity, needing cues to deep breathe to recover.  MSW aware of DC concerns & needs. 10/31. Supine upon arrival.  Work on bed mobility as follows:supine>eob with Min A and verbal cues for hand placement. Sit>stand with walker in front Min A. Ambulated 30 ft to the restroom with RW and Min A and verbal cues. Therapist removed her brief that was already wet with stool. Sat on the commode for about 6 min. Pt tried to wipe as much as she could. Then stood up and work on her balance while therapist clean up pt. Then sat back down and rested few min. Then sit>stand again and ambulated another 30 ft with Min A back to the chair. Stood up while therapist got the brief line up, before she sat down. Return to the chair with Min A, needs in reach and instructed to call for assist, before getting up.      [address] [facility] [address] [address] AM-PAC 6 Clicks Basic Mobility Inpatient Short Form  AM-PAC Mobility Inpatient   How much difficulty turning over in bed?: A Little  How much difficulty sitting down on / standing up from a chair with arms?: A Lot  How much difficulty moving from lying on back to sitting on side of bed?: A Little  How much help from another person moving to and from a bed to a chair?: A Lot  How much help from another person needed to walk in hospital room?: A Lot  How much help from another person for climbing 3-5 steps with a railing?: Total  AM-PAC Inpatient Mobility Raw Score : 13  AM-PAC Inpatient T-Scale Score : 36.74  Mobility Inpatient CMS 0-100% Score: 64.91  Mobility Inpatient CMS G-Code Modifier : CL    SUBJECTIVE:   Ms. Hakan Drew stated can I try to walk to the restroom    Social/Functional Lives With: Spouse (facility staff)  Type of Home: House  Home Layout: Two level (pt has stair lift)  Home Access: Stairs to enter with rails  Entrance Stairs - Number of Steps: 7  Entrance Stairs - Rails: Left  Ambulation Assistance: Needs assistance  Transfer Assistance: Needs assistance  Additional Comments: pt was recently in [facility] for rehab until this admission    OBJECTIVE: PAIN: VITALS / O2: PRECAUTION / [place] / DRAINS:   Pre Treatment:     with movement      Post Treatment: no pain Vitals NT       Oxygen 94% on 4 liters pre exertion , 88% post exertion with ~60sec to recover to 93% on 4 liters      IV and 02 support    RESTRICTIONS/PRECAUTIONS:  Restrictions/Precautions: Fall Risk                 GROSS EVALUATION: Intact Impaired (Comments):   AROM []  Decreased & non-functional   PROM []    Strength []  Decreased & non-functional   Balance []  Decreased & non-functional   Posture [] N/A   Sensation [x]  grossly   Coordination []   Decreased & non-functional   Tone [x]     Edema []    Activity Tolerance [x]  poor    []      COGNITION/  PERCEPTION: Intact Impaired (Comments):   Orientation [x]  X 4   Vision [x]     Hearing [x]     Cognition  []  Slow to process, word find disorder, perseverates during activity     MOBILITY: I Mod I S SBA CGA Min Mod Max Total  NT x2 Comments:   Bed Mobility    Rolling [] [] [] [] [] [x] [] [] [] [] []    Supine to Sit [] [] [] [] [] [x] [] [] [] [] []    Scooting [] [] [] [] [] [x] [] [] [] [] []    Sit to Supine [] [] [] [] [] [] [] [] [] [] []    Transfers    Sit to Stand [] [] [] [] [] [x] [] [] [] [] [] With walker   Bed to Chair [] [] [] [] [] [x] [] [] [] [] []    Stand to Sit [] [] [] [] [] [x] [] [] [] [] [x]    restroom [] [] [] [] [] [x] [] [] [] [] []    I=Independent, Mod I=Modified Independent, S=Supervision, SBA=Standby Assistance, CGA=Contact Guard Assistance,   Min=Minimal Assistance, Mod=Moderate Assistance, Max=Maximal Assistance, Total=Total Assistance, NT=Not Tested    GAIT: I Mod I S SBA CGA Min Mod Max Total  NT x2 Comments:   Level of Assistance [] [] [] [] [] [x] [] [] [] [] []    Distance 30 (+ 30)     DME Rolling Walker    Gait Quality Decreased michelle , Decreased step clearance, Decreased step length, and Trunk sway increased    Weightbearing Status      Stairs NT     I=Independent, Mod I=Modified Independent, S=Supervision, SBA=Standby Assistance, CGA=Contact Guard Assistance,   Min=Minimal Assistance, Mod=Moderate Assistance, Max=Maximal Assistance, Total=Total Assistance, NT=Not Tested    PLAN:   FREQUENCY AND DURATION: Daily for duration of hospital stay or until stated goals are met, whichever comes first.    THERAPY PROGNOSIS: Fair    PROBLEM LIST:   (Skilled intervention is medically necessary to address:)  Decreased ADL/Functional Activities  Decreased Activity Tolerance  Decreased AROM/PROM  Decreased Balance  Decreased Cognition  Decreased Coordination  Decreased Gait Ability  Decreased Safety Awareness  Decreased Strength  Decreased Transfer Abilities INTERVENTIONS PLANNED:   (Benefits and precautions of physical therapy have been discussed with the patient.)  Therapeutic Activity  Therapeutic Exercise/HEP  Gait Training  Education       TREATMENT:   EVALUATION: HIGH COMPLEXITY: (Untimed Charge)    TREATMENT:   Therapeutic Activity (38 Minutes): Therapeutic activity included Supine to Sit, Transfer Training, Ambulation on level ground, Sitting balance , and Standing balance to improve functional Activity tolerance, Balance, Coordination, Mobility, Strength, and ROM.     TREATMENT GRID:  N/A    AFTER TREATMENT PRECAUTIONS: Alarm Activated, Bed, Bed/Chair Locked, Call light within reach, Needs within reach, and RN notified    INTERDISCIPLINARY COLLABORATION:  RN/ PCT and RN Case Manager/      EDUCATION: Education Given To: Patient  Education Provided: Role of Therapy  Education Method: Demonstration;Verbal    TIME IN/OUT:  Time In: 0945  Time Out: [address]  Minutes: [facility], [facility]

## 2022-10-31 NOTE — PROGRESS NOTES
ACUTE PHYSICAL THERAPY GOALS:   (Developed with and agreed upon by patient and/or caregiver.)  DISCHARGE GOALS :  (1.)Ms. Luis Daniel Smith will move from supine to sit and sit to supine  with CONTACT GUARD ASSIST with HOB 20 deg using rail. (2.)Ms. Luis Daniel Smith will transfer from bed to chair and chair to bed with MINIMAL ASSIST using walker. (3.)Ms. Luis Daniel Smith will ambulate with MINIMAL ASSIST for 40-50 feet with rolling walker. ________________________________________________________________________________________________     PHYSICAL THERAPY Daily Note and PM  (Link to Caseload Tracking: PT Visit Days : 3  Acknowledge Orders  Time In/Out  PT Charge Capture  Rehab Caseload Tracker    Filiberto Ochoa is a 69 y.o. female   PRIMARY DIAGNOSIS: Pneumonia due to organism  Metabolic encephalopathy [K74.95]  Pneumonia due to organism [J18.9]  JOSE (acute kidney injury) ([facility] [address] [N17.9]  Pneumonia of left lower lobe due to infectious organism [J18.9]  Leukocytosis, unspecified type [D72.829]       Reason for Referral: Generalized Muscle Weakness (M62.81)  Other lack of cordination (R27.8)  Difficulty in walking, Not elsewhere classified (R26.2)  Other abnormalities of gait and mobility (R26.89)  Inpatient: Payor: MEDICARE / Plan: MEDICARE PART A AND B / Product Type: *No Product type* /     ASSESSMENT:     REHAB RECOMMENDATIONS:   Recommendation to date pending progress:  Setting:  Short-term Rehab    Equipment:    To Be Determined     ASSESSMENT:  Ms. Luis Daniel Smith presented as alert & oriented to person, place, time & situation but pt was slow to process cues & did perseverate during activity. This pt is significantly limited functionally & is unable to return home without 24/7 help. This pt will benefit from follow up therapy while in house & will also need follow up [address] [place] [address]. This pt is currently 02 dependent on 4 liters, with tendency to drop with activity, needing cues to deep breathe to recover.  MSW aware of DC concerns & needs. 10/31. Supine upon arrival.  Work on bed mobility as follows:supine>eob with Min A and verbal cues for hand placement. Sit>stand with walker in front Min A. Ambulated 30 ft to the restroom with RW and Min A and verbal cues. Therapist removed her brief that was already wet with stool. Sat on the commode for about 6 min. Pt tried to wipe as much as she could. Then stood up and work on her balance while therapist clean up pt. Then sat back down and rested few min. Then sit>stand again and ambulated another 30 ft with Min A back to the chair. Stood up while therapist got the brief line up, before she sat down. Return to the chair with Min A, needs in reach and instructed to call for assist, before getting up.  10/31 pm supine upon arrival.   Rolling from L><R x 4 to get clean up and new brief applied. Then work on B LE exercises with guidance. Then work on scooting up in the bed with Min A. Remain in the bed with needs in reach and instructed to call, before getting up.      [address] [facility] [address] [address] AM-PAC 6 Clicks Basic Mobility Inpatient Short Form  AM-PAC Mobility Inpatient   How much difficulty turning over in bed?: A Little  How much difficulty sitting down on / standing up from a chair with arms?: A Lot  How much difficulty moving from lying on back to sitting on side of bed?: A Little  How much help from another person moving to and from a bed to a chair?: A Lot  How much help from another person needed to walk in hospital room?: A Lot  How much help from another person for climbing 3-5 steps with a railing?: Total  -PAC Inpatient Mobility Raw Score : 13  AM-PAC Inpatient T-Scale Score : 36.74  Mobility Inpatient CMS 0-100% Score: 64.91  Mobility Inpatient CMS G-Code Modifier : CL    SUBJECTIVE:   Ms. Eddie Edmonds stated \"I need to be clean up\"    Social/Functional Lives With: Spouse (facility staff)  Type of Home: House  Home Layout: Two level (pt has stair lift)  Home Access: Stairs to enter with rails  Entrance Stairs - Number of Steps: 7  Entrance Stairs - Rails: Left  Ambulation Assistance: Needs assistance  Transfer Assistance: Needs assistance  Additional Comments: pt was recently in [facility] for rehab until this admission    OBJECTIVE:     PAIN: [place] / O2: Neomi Merlin / Simba Asencio / [place]:   Pre Treatment:     with movement      Post Treatment: no pain Vitals NT       Oxygen 94% on 4 liters pre exertion , 88% post exertion with ~60sec to recover to 93% on 4 liters      IV and 02 support    RESTRICTIONS/PRECAUTIONS:  Restrictions/Precautions: Fall Risk                 GROSS EVALUATION: Intact Impaired (Comments):   AROM []  Decreased & non-functional   PROM []    Strength []  Decreased & non-functional   Balance []  Decreased & non-functional   Posture [] N/A   Sensation [x]  grossly   Coordination []   Decreased & non-functional   Tone [x]     Edema []    Activity Tolerance [x]  poor    []      COGNITION/  PERCEPTION: Intact Impaired (Comments):   Orientation [x]  X 4   Vision [x]     Hearing [x]     Cognition  []  Slow to process, word find disorder, perseverates during activity     MOBILITY: I Mod I S SBA CGA Min Mod Max Total  NT x2 Comments:   Bed Mobility    Rolling [] [] [] [] [] [x] [] [] [] [] []    Supine to Sit [] [] [] [] [] [x] [] [] [] [] []    Scooting [] [] [] [] [] [x] [] [] [] [] []    Sit to Supine [] [] [] [] [] [] [] [] [] [] []    Transfers    Sit to Stand [] [] [] [] [] [] [] [] [] [] [] With walker   Bed to Chair [] [] [] [] [] [] [] [] [] [] []    Stand to Sit [] [] [] [] [] [] [] [] [] [] []    restroom [] [] [] [] [] [] [] [] [] [] []    I=Independent, Mod I=Modified Independent, S=Supervision, SBA=Standby Assistance, CGA=Contact Guard Assistance,   Min=Minimal Assistance, Mod=Moderate Assistance, Max=Maximal Assistance, Total=Total Assistance, NT=Not Tested    GAIT: I Mod I S SBA CGA Min Mod Max Total  NT x2 Comments:   Level of Assistance [] [] [] [] [] [x] [] [] [] [] [] Distance 0     DME Rolling Walker    Gait Quality Decreased michelle , Decreased step clearance, Decreased step length, and Trunk sway increased    Weightbearing Status      Stairs NT     I=Independent, Mod I=Modified Independent, S=Supervision, SBA=Standby Assistance, CGA=Contact Guard Assistance,   Min=Minimal Assistance, Mod=Moderate Assistance, Max=Maximal Assistance, Total=Total Assistance, NT=Not Tested    PLAN:   FREQUENCY AND DURATION: Daily for duration of hospital stay or until stated goals are met, whichever comes first.    THERAPY PROGNOSIS: Fair    PROBLEM LIST:   (Skilled intervention is medically necessary to address:)  Decreased ADL/Functional Activities  Decreased Activity Tolerance  Decreased AROM/PROM  Decreased Balance  Decreased Cognition  Decreased Coordination  Decreased Gait Ability  Decreased Safety Awareness  Decreased Strength  Decreased Transfer Abilities INTERVENTIONS PLANNED:   (Benefits and precautions of physical therapy have been discussed with the patient.)  Therapeutic Activity  Therapeutic Exercise/HEP  Gait Training  Education       TREATMENT:   EVALUATION: HIGH COMPLEXITY: (Untimed Charge)    TREATMENT:   Therapeutic Activity (30 Minutes): Therapeutic activity included Sit to Supine, Scooting, and Transfer Training to improve functional Activity tolerance, Balance, Coordination, Mobility, Strength, and ROM.     TREATMENT GRID:  B LE Date:  10/31 Date:   Date:     Activity/Exercise Parameters Parameters Parameters   Ankle pumps 12     Quad sets 12     Hip abd/ad 12     Heel slides 12                               AFTER TREATMENT PRECAUTIONS: Alarm Activated, Bed, Bed/Chair Locked, Call light within reach, Needs within reach, and RN notified    INTERDISCIPLINARY COLLABORATION:  RN/ PCT and RN Case Manager/      EDUCATION: Education Given To: Patient  Education Provided: Role of Therapy  Education Method: Demonstration;Verbal    TIME IN/OUT:  Time In: 1300  Time Out: [address]  Minutes: [address] [facility] [place], S[place]

## 2022-10-31 NOTE — PROGRESS NOTES
SPEECH PATHOLOGY NOTE:    Attempted to see patient for bedside swallow evaluation; however, patient politely declined evaluation stating she is nauseous. Nursing student at bedside reports RN was just notified of patient's complaint. Will follow up at later time/date. ADDENDUM:    Attempted to see patient with lunch meal. Upon arrival it appeared that patient had taken a few bites off lunch tray; however, she refused further trials at this time. Will follow up tomorrow.      Jessi Mirza MS, CCC-SLP

## 2022-10-31 NOTE — PROGRESS NOTES
Hospitalist Progress Note   Admit Date:  10/28/2022 10:59 PM   Name:  Candi Weathers   Age:  69 y.o. Sex:  female  :  1955   MRN:  [id number]   Room:  [facility]/    Presenting Complaint: Altered Mental Status (Was sepsis/ possible uti / bed bound for 3 days not norm)     Reason(s) for Admission: Metabolic encephalopathy [S15.60]  Pneumonia due to organism [J18.9]  JOSE (acute kidney injury) ([address] [N17.9]  Pneumonia of left lower lobe due to infectious organism [J18.9]  Leukocytosis, unspecified type [facility] Course:       Ms. Peng Dobson is a 68 yo female with PMH of GBS bacteremia 10,2022 completed amoxil 10-20-22, debility, HTN, DM2, pAFIB, CVA who is evaluated with weakness/ acute encephalopathy. Found with left base pneumonia. CT head negative. UA positive. Cx skin teodoro. She is on course of IV antibiotics. Blood cultures pending. She had JOSE- resolved after being hydrated. Mentation has improved. Declined SLP evaluation. Discharge plans pending. Wants to return to SNF        Subjective & 24hr Events (10/31/22):      Has hip pain on left, now across her abdomen, having loose BM, no cough or dyspnea, some nausea, has anorexia, poor sleep, some bleeding with some bleeding thought hemorrhoidal , rash under left breast       Assessment & Plan:     Principal Problem:    Pneumonia due to organism  Plan:   Wean O2 as tolerant- currently on room air   D3 rocephin and doxycycline  Followup blood cultures   Consulted SLP- declines        Active Problems:    Chronic pain syndrome  Plan:   Norco as needed        Metabolic encephalopathy  Plan:   Seems improved  Followup mentation stable          JOSE (acute kidney injury) ([address]  Plan:   stopped IVF  Followup labs resolved       UTI:  D3 rocephin   Negative  culture      Diabetes mellitus type 2, controlled ([address]  Plan:   SSI      HTN:  Continued coreg/ hydralazine        Depression:  Continued chronic meds     Anemia:  Trend HGB    Diarrhea:  Check cdiff and stool cx  Check KUB      Rectal bleed:  Possible hemorrhoidal  Check occult stool       Left hip pain:  Xray       Left breast rash:  Mycostatin         Anticipated discharge needs:      Pending     Diet:  ADULT DIET; Regular  DVT PPx: lovenox  Code status: Full Code    Hospital Problems:  Principal Problem:    Pneumonia due to organism  Active Problems:    Chronic pain syndrome    Metabolic encephalopathy    Pneumonia of left lower lobe due to infectious organism    JOSE (acute kidney injury) ([facility]    Diabetes mellitus type 2, controlled ([facility]  Resolved Problems:    Leukocytosis      Objective:   Patient Vitals for the past 24 hrs:   Temp Pulse Resp BP SpO2   10/31/22 1527 -- -- 18 -- --   10/31/22 1457 -- -- 18 -- --   10/31/22 1445 97.5 °F (36.4 °C) (!) 46 14 (!) 143/65 99 %   10/31/22 1045 98.4 °F (36.9 °C) 52 16 (!) 126/92 99 %   10/31/22 1023 -- -- 18 -- --   10/31/22 0953 -- -- 18 -- --   10/31/22 0700 98.3 °F (36.8 °C) 53 12 (!) 135/56 96 %   10/31/22 0614 -- -- -- (!) 157/71 --   10/31/22 0346 -- -- 15 -- --   10/31/22 0318 98.6 °F (37 °C) 63 18 (!) 161/79 96 %   10/31/22 0316 -- -- 16 -- --   10/30/22 2255 99 °F (37.2 °C) 57 18 (!) 140/55 95 %   10/30/22 2131 -- 63 -- (!) 165/69 --   10/30/22 1907 -- -- 15 -- --   10/30/22 1846 98.8 °F (37.1 °C) 63 18 (!) 165/69 96 %   10/30/22 1837 -- -- 18 -- --       Oxygen Therapy  SpO2: 99 %  Pulse Oximeter Device Mode: Intermittent  Pulse Oximeter Device Location: Left, Finger  O2 Device: None (Room air)  O2 Flow Rate (L/min): 3 L/min    Estimated body mass index is 31.32 kg/m² as calculated from the following:    Height as of this encounter: 5' 7\" (1.702 m). Weight as of this encounter: 200 lb (90.7 kg). No intake or output data in the 24 hours ending 10/31/22 1808      Physical Exam:     Blood pressure (!) 143/65, pulse (!) 46, temperature 97.5 °F (36.4 °C), temperature source Oral, resp.  rate 18, height 5' 7\" (1.702 m), weight 200 lb (90.7 kg), SpO2 99 %. General:    Well nourished. Elderly, no distress , alert, pleasant   CV:   RRR. III/VI KARISSA LUSB  No jugular venous distension. No edema   Lungs:   CTAB. No wheezing, rhonchi, or rales. Symmetric expansion. Anterior   Breast:  Fungal infection under left breast with maceration   Abdomen: Bowel sounds present. Soft, nontender, nondistended. Extremities: No cyanosis or clubbing. No edema  Skin:     No rashes and normal coloration. Warm and dry.     Neuro:  A and O x 3  Psych:  Interactive     I have personally reviewed labs and tests showing:  Recent Labs:  Recent Results (from the past 48 hour(s))   POCT Glucose    Collection Time: 10/29/22  9:05 PM   Result Value Ref Range    POC Glucose 201 (H) 65 - 100 mg/dL    Performed by: Nikki    Comprehensive Metabolic Panel w/ Reflex to MG    Collection Time: 10/30/22  4:31 AM   Result Value Ref Range    Sodium 139 133 - 143 mmol/L    Potassium 3.8 3.5 - 5.1 mmol/L    Chloride 110 101 - 110 mmol/L    CO2 24 21 - 32 mmol/L    Anion Gap 5 2 - 11 mmol/L    Glucose 178 (H) 65 - 100 mg/dL    BUN 20 8 - 23 MG/DL    Creatinine 0.57 (L) 0.6 - 1.0 MG/DL    Est, Glom Filt Rate >60 >60 ml/min/1.73m2    Calcium 8.8 8.3 - 10.4 MG/DL    Total Bilirubin 0.2 0.2 - 1.1 MG/DL    ALT 14 12 - 65 U/L    AST 16 15 - 37 U/L    Alk Phosphatase 65 50 - 136 U/L    Total Protein 6.8 6.3 - 8.2 g/dL    Albumin 1.9 (L) 3.2 - 4.6 g/dL    Globulin 4.9 (H) 2.8 - 4.5 g/dL    Albumin/Globulin Ratio 0.4 0.4 - 1.6     CBC with Auto Differential    Collection Time: 10/30/22  4:31 AM   Result Value Ref Range    WBC 9.2 4.3 - 11.1 K/uL    RBC 3.15 (L) 4.05 - 5.2 M/uL    Hemoglobin 9.2 (L) 11.7 - 15.4 g/dL    Hematocrit 29.5 (L) 35.8 - 46.3 %    MCV 93.7 82.0 - 102.0 FL    MCH 29.2 26.1 - 32.9 PG    MCHC 31.2 (L) 31.4 - 35.0 g/dL    RDW 13.6 11.9 - 14.6 %    Platelets 893 824 - 640 K/uL    MPV 9.5 9.4 - 12.3 FL    nRBC 0.00 0.0 - 0.2 K/uL    Differential Type AUTOMATED      Seg Neutrophils 62 43 - 78 %    Lymphocytes 24 13 - 44 %    Monocytes 9 4.0 - 12.0 %    Eosinophils % 4 0.5 - 7.8 %    Basophils 1 0.0 - 2.0 %    Immature Granulocytes 1 0.0 - 5.0 %    Segs Absolute 5.6 1.7 - 8.2 K/UL    Absolute Lymph # 2.2 0.5 - 4.6 K/UL    Absolute Mono # 0.8 0.1 - 1.3 K/UL    Absolute Eos # 0.4 0.0 - 0.8 K/UL    Basophils Absolute 0.1 0.0 - 0.2 K/UL    Absolute Immature Granulocyte 0.1 0.0 - 0.5 K/UL   POCT Glucose    Collection Time: 10/30/22  6:21 AM   Result Value Ref Range    POC Glucose 184 (H) 65 - 100 mg/dL    Performed by: Nikki    POCT Glucose    Collection Time: 10/30/22 11:44 AM   Result Value Ref Range    POC Glucose 198 (H) 65 - 100 mg/dL    Performed by: Gina    POCT Glucose    Collection Time: 10/30/22  3:26 PM   Result Value Ref Range    POC Glucose 180 (H) 65 - 100 mg/dL    Performed by: JhonnyreCompanion    POCT Glucose    Collection Time: 10/30/22  9:02 PM   Result Value Ref Range    POC Glucose 180 (H) 65 - 100 mg/dL    Performed by: Yelena Joel    CBC with Auto Differential    Collection Time: 10/31/22  4:24 AM   Result Value Ref Range    WBC 8.6 4.3 - 11.1 K/uL    RBC 3.44 (L) 4.05 - 5.2 M/uL    Hemoglobin 10.1 (L) 11.7 - 15.4 g/dL    Hematocrit 31.7 (L) 35.8 - 46.3 %    MCV 92.2 82.0 - 102.0 FL    MCH 29.4 26.1 - 32.9 PG    MCHC 31.9 31.4 - 35.0 g/dL    RDW 13.6 11.9 - 14.6 %    Platelets 492 116 - 184 K/uL    MPV 9.6 9.4 - 12.3 FL    nRBC 0.00 0.0 - 0.2 K/uL    Differential Type AUTOMATED      Seg Neutrophils 55 43 - 78 %    Lymphocytes 28 13 - 44 %    Monocytes 9 4.0 - 12.0 %    Eosinophils % 4 0.5 - 7.8 %    Basophils 1 0.0 - 2.0 %    Immature Granulocytes 3 0.0 - 5.0 %    Segs Absolute 4.7 1.7 - 8.2 K/UL    Absolute Lymph # 2.4 0.5 - 4.6 K/UL    Absolute Mono # 0.8 0.1 - 1.3 K/UL    Absolute Eos # 0.3 0.0 - 0.8 K/UL    Basophils Absolute 0.1 0.0 - 0.2 K/UL    Absolute Immature Granulocyte 0.3 0.0 - 0.5 K/UL Basic Metabolic Panel    Collection Time: 10/31/22  4:24 AM   Result Value Ref Range    Sodium 138 133 - 143 mmol/L    Potassium 3.7 3.5 - 5.1 mmol/L    Chloride 107 101 - 110 mmol/L    CO2 24 21 - 32 mmol/L    Anion Gap 7 2 - 11 mmol/L    Glucose 181 (H) 65 - 100 mg/dL    BUN 11 8 - 23 MG/DL    Creatinine 0.52 (L) 0.6 - 1.0 MG/DL    Est, Glom Filt Rate >60 >60 ml/min/1.73m2    Calcium 9.0 8.3 - 10.4 MG/DL   POCT Glucose    Collection Time: 10/31/22  6:18 AM   Result Value Ref Range    POC Glucose 181 (H) 65 - 100 mg/dL    Performed by: Tana Pace    POCT Glucose    Collection Time: 10/31/22  7:42 AM   Result Value Ref Range    POC Glucose 189 (H) 65 - 100 mg/dL    Performed by: Braeden Joel    POCT Glucose    Collection Time: 10/31/22 11:37 AM   Result Value Ref Range    POC Glucose 200 (H) 65 - 100 mg/dL    Performed by: Braeden Joel    POCT Glucose    Collection Time: 10/31/22  4:44 PM   Result Value Ref Range    POC Glucose 160 (H) 65 - 100 mg/dL    Performed by: Tal Saul        I have personally reviewed imaging studies showing: Other Studies:  XR HIP 2-3 VW W PELVIS LEFT   Final Result   No acute process evident by x-ray. XR ABDOMEN (KUB) (SINGLE AP VIEW)   Final Result   No acute radiographic abnormality. CT Head W/O Contrast   Final Result   No acute process. XR CHEST PORTABLE   Final Result   New left lung base atelectasis or pneumonia.           Current Meds:  Current Facility-Administered Medications   Medication Dose Route Frequency    nystatin (MYCOSTATIN) powder   Topical BID PRN    saccharomyces boulardii (FLORASTOR) capsule 250 mg  250 mg Oral BID    tiZANidine (ZANAFLEX) tablet 4 mg  4 mg Oral Q6H PRN    estradiol (ESTRACE) tablet 0.5 mg  0.5 mg Oral Daily    hydrALAZINE (APRESOLINE) tablet 25 mg  25 mg Oral 3 times per day    cetirizine (ZYRTEC) tablet 10 mg  10 mg Oral Daily    pantoprazole (PROTONIX) tablet 40 mg  40 mg Oral QAM AC    sodium chloride flush 0.9 % injection 5-40 mL  5-40 mL IntraVENous 2 times per day    sodium chloride flush 0.9 % injection 5-40 mL  5-40 mL IntraVENous PRN    0.9 % sodium chloride infusion   IntraVENous PRN    enoxaparin (LOVENOX) injection 40 mg  40 mg SubCUTAneous Daily    ondansetron (ZOFRAN-ODT) disintegrating tablet 4 mg  4 mg Oral Q8H PRN    Or    ondansetron (ZOFRAN) injection 4 mg  4 mg IntraVENous Q6H PRN    polyethylene glycol (GLYCOLAX) packet 17 g  17 g Oral Daily PRN    acetaminophen (TYLENOL) tablet 650 mg  650 mg Oral Q6H PRN    Or    acetaminophen (TYLENOL) suppository 650 mg  650 mg Rectal Q6H PRN    cefTRIAXone (ROCEPHIN) 1,000 mg in sodium chloride 0.9 % 50 mL IVPB mini-bag  1,000 mg IntraVENous Q24H    And    doxycycline hyclate (VIBRAMYCIN) capsule 100 mg  100 mg Oral 2 times per day    insulin lispro (HUMALOG) injection vial 0-8 Units  0-8 Units SubCUTAneous TID WC    insulin lispro (HUMALOG) injection vial 0-4 Units  0-4 Units SubCUTAneous Nightly    carvedilol (COREG) tablet 12.5 mg  12.5 mg Oral BID    gabapentin (NEURONTIN) capsule 300 mg  300 mg Oral TID    atorvastatin (LIPITOR) tablet 10 mg  10 mg Oral Daily    topiramate (TOPAMAX) tablet 100 mg  100 mg Oral BID    traZODone (DESYREL) tablet 50 mg  50 mg Oral Nightly    glucose chewable tablet 16 g  4 tablet Oral PRN    dextrose bolus 10% 125 mL  125 mL IntraVENous PRN    Or    dextrose bolus 10% 250 mL  250 mL IntraVENous PRN    glucagon (rDNA) injection 1 mg  1 mg SubCUTAneous PRN    dextrose 10 % infusion   IntraVENous Continuous PRN    magnesium oxide (MAG-OX) tablet 400 mg  400 mg Oral BID    DULoxetine (CYMBALTA) extended release capsule 60 mg  60 mg Oral Daily    And    DULoxetine (CYMBALTA) extended release capsule 30 mg  30 mg Oral Nightly    HYDROcodone-acetaminophen (NORCO) 5-325 MG per tablet 1 tablet  1 tablet Oral Q6H PRN       Signed:  Kristi Maloney MD    Part of this note may have been written by using a voice dictation software.   The note has been proof read but may still contain some grammatical/other typographical errors.

## 2022-10-31 NOTE — PROGRESS NOTES
Pt resting in bed comfortably at this time, alert and oriented times 4. No distress noted, respirations even and unlabored on room air. Pt complains of chronic back pain (pt has been medicated with PRN pain meds - see MAR). Pt instructed to call for assistance if needed, call light in place, will continue to monitor.

## 2022-10-31 NOTE — PROGRESS NOTES
Pt resting in bed comfortably at this time, alert and oriented times 4. No distress noted, respirations even and unlabored on room air. Pt denies pain at this time. Pt instructed to call for assistance if needed, call light in place, will continue to monitor. Will prepare for bedside shift report.

## 2022-10-31 NOTE — CARE COORDINATION
SNF bed offers given to patient and spouse at bedside. They inquired about the availability of a private room and agreed to call the facilities to determine if a private room is available. CM returned later in the day to follow up. Pt stated her  went to work and that they had not made a decision yet. Pt uncertain about a decision today. CM agreed to follow up tomorrow, however advised that some beds may be filled by then, so we are taking a chance on losing the bed. Pt expressed understanding and agreement. Continue to follow.

## 2022-11-01 ENCOUNTER — APPOINTMENT (OUTPATIENT)
Dept: CT IMAGING | Age: 67
DRG: 193 | End: 2022-11-01
Payer: MEDICARE

## 2022-11-01 LAB
ANION GAP SERPL CALC-SCNC: 7 MMOL/L (ref 2–11)
BASOPHILS # BLD: 0.1 K/UL (ref 0–0.2)
BASOPHILS NFR BLD: 1 % (ref 0–2)
BUN SERPL-MCNC: 9 MG/DL (ref 8–23)
CALCIUM SERPL-MCNC: 8.8 MG/DL (ref 8.3–10.4)
CHLORIDE SERPL-SCNC: 106 MMOL/L (ref 101–110)
CO2 SERPL-SCNC: 25 MMOL/L (ref 21–32)
CREAT SERPL-MCNC: 0.58 MG/DL (ref 0.6–1)
DIFFERENTIAL METHOD BLD: ABNORMAL
EOSINOPHIL # BLD: 0.3 K/UL (ref 0–0.8)
EOSINOPHIL NFR BLD: 4 % (ref 0.5–7.8)
ERYTHROCYTE [DISTWIDTH] IN BLOOD BY AUTOMATED COUNT: 13.4 % (ref 11.9–14.6)
GLUCOSE BLD STRIP.AUTO-MCNC: 159 MG/DL (ref 65–100)
GLUCOSE BLD STRIP.AUTO-MCNC: 185 MG/DL (ref 65–100)
GLUCOSE BLD STRIP.AUTO-MCNC: 192 MG/DL (ref 65–100)
GLUCOSE BLD STRIP.AUTO-MCNC: 193 MG/DL (ref 65–100)
GLUCOSE SERPL-MCNC: 171 MG/DL (ref 65–100)
HCT VFR BLD AUTO: 31.8 % (ref 35.8–46.3)
HGB BLD-MCNC: 10.1 G/DL (ref 11.7–15.4)
IMM GRANULOCYTES # BLD AUTO: 0.4 K/UL (ref 0–0.5)
IMM GRANULOCYTES NFR BLD AUTO: 5 % (ref 0–5)
LYMPHOCYTES # BLD: 2.4 K/UL (ref 0.5–4.6)
LYMPHOCYTES NFR BLD: 30 % (ref 13–44)
MCH RBC QN AUTO: 29.4 PG (ref 26.1–32.9)
MCHC RBC AUTO-ENTMCNC: 31.8 G/DL (ref 31.4–35)
MCV RBC AUTO: 92.4 FL (ref 82–102)
MONOCYTES # BLD: 0.7 K/UL (ref 0.1–1.3)
MONOCYTES NFR BLD: 9 % (ref 4–12)
NEUTS SEG # BLD: 4 K/UL (ref 1.7–8.2)
NEUTS SEG NFR BLD: 51 % (ref 43–78)
NRBC # BLD: 0 K/UL (ref 0–0.2)
PLATELET # BLD AUTO: 320 K/UL (ref 150–450)
PMV BLD AUTO: 9.6 FL (ref 9.4–12.3)
POTASSIUM SERPL-SCNC: 3.6 MMOL/L (ref 3.5–5.1)
RBC # BLD AUTO: 3.44 M/UL (ref 4.05–5.2)
SERVICE CMNT-IMP: ABNORMAL
SODIUM SERPL-SCNC: 138 MMOL/L (ref 133–143)
WBC # BLD AUTO: 7.9 K/UL (ref 4.3–11.1)

## 2022-11-01 PROCEDURE — 2500000003 HC RX 250 WO HCPCS: Performed by: INTERNAL MEDICINE

## 2022-11-01 PROCEDURE — 2580000003 HC RX 258: Performed by: HOSPITALIST

## 2022-11-01 PROCEDURE — 1100000000 HC RM PRIVATE

## 2022-11-01 PROCEDURE — 85025 COMPLETE CBC W/AUTO DIFF WBC: CPT

## 2022-11-01 PROCEDURE — 6370000000 HC RX 637 (ALT 250 FOR IP): Performed by: INTERNAL MEDICINE

## 2022-11-01 PROCEDURE — 6360000002 HC RX W HCPCS: Performed by: HOSPITALIST

## 2022-11-01 PROCEDURE — 6370000000 HC RX 637 (ALT 250 FOR IP): Performed by: HOSPITALIST

## 2022-11-01 PROCEDURE — 36415 COLL VENOUS BLD VENIPUNCTURE: CPT

## 2022-11-01 PROCEDURE — 73700 CT LOWER EXTREMITY W/O DYE: CPT

## 2022-11-01 PROCEDURE — 80048 BASIC METABOLIC PNL TOTAL CA: CPT

## 2022-11-01 PROCEDURE — 92610 EVALUATE SWALLOWING FUNCTION: CPT

## 2022-11-01 PROCEDURE — 82962 GLUCOSE BLOOD TEST: CPT

## 2022-11-01 RX ORDER — OXYCODONE AND ACETAMINOPHEN 10; 325 MG/1; MG/1
1 TABLET ORAL EVERY 6 HOURS PRN
Status: DISCONTINUED | OUTPATIENT
Start: 2022-11-01 | End: 2022-11-02 | Stop reason: HOSPADM

## 2022-11-01 RX ADMIN — DULOXETINE HYDROCHLORIDE 60 MG: 60 CAPSULE, DELAYED RELEASE ORAL at 08:30

## 2022-11-01 RX ADMIN — CARVEDILOL 12.5 MG: 6.25 TABLET, FILM COATED ORAL at 08:30

## 2022-11-01 RX ADMIN — TOPIRAMATE 100 MG: 100 TABLET, FILM COATED ORAL at 08:30

## 2022-11-01 RX ADMIN — HYDROCODONE BITARTRATE AND ACETAMINOPHEN 1 TABLET: 5; 325 TABLET ORAL at 03:55

## 2022-11-01 RX ADMIN — TIZANIDINE 4 MG: 2 TABLET ORAL at 03:55

## 2022-11-01 RX ADMIN — DOXYCYCLINE HYCLATE 100 MG: 100 CAPSULE ORAL at 08:30

## 2022-11-01 RX ADMIN — Medication 250 MG: at 08:30

## 2022-11-01 RX ADMIN — TUBERCULIN PURIFIED PROTEIN DERIVATIVE 5 UNITS: 5 INJECTION, SOLUTION INTRADERMAL at 20:17

## 2022-11-01 RX ADMIN — CEFTRIAXONE 1000 MG: 1 INJECTION, POWDER, FOR SOLUTION INTRAMUSCULAR; INTRAVENOUS at 03:28

## 2022-11-01 RX ADMIN — SODIUM CHLORIDE, PRESERVATIVE FREE 10 ML: 5 INJECTION INTRAVENOUS at 08:31

## 2022-11-01 RX ADMIN — GABAPENTIN 300 MG: 300 CAPSULE ORAL at 14:31

## 2022-11-01 RX ADMIN — HYDRALAZINE HYDROCHLORIDE 25 MG: 25 TABLET, FILM COATED ORAL at 14:31

## 2022-11-01 RX ADMIN — ESTRADIOL 0.5 MG: 1 TABLET ORAL at 08:31

## 2022-11-01 RX ADMIN — OXYCODONE AND ACETAMINOPHEN 1 TABLET: 325; 10 TABLET ORAL at 22:20

## 2022-11-01 RX ADMIN — HYDRALAZINE HYDROCHLORIDE 25 MG: 25 TABLET, FILM COATED ORAL at 20:13

## 2022-11-01 RX ADMIN — DOXYCYCLINE HYCLATE 100 MG: 100 CAPSULE ORAL at 20:14

## 2022-11-01 RX ADMIN — Medication 250 MG: at 20:13

## 2022-11-01 RX ADMIN — GABAPENTIN 300 MG: 300 CAPSULE ORAL at 08:30

## 2022-11-01 RX ADMIN — DULOXETINE HYDROCHLORIDE 30 MG: 30 CAPSULE, DELAYED RELEASE ORAL at 20:13

## 2022-11-01 RX ADMIN — GABAPENTIN 300 MG: 300 CAPSULE ORAL at 20:15

## 2022-11-01 RX ADMIN — CETIRIZINE HYDROCHLORIDE 10 MG: 10 TABLET ORAL at 08:30

## 2022-11-01 RX ADMIN — HYDRALAZINE HYDROCHLORIDE 25 MG: 25 TABLET, FILM COATED ORAL at 06:27

## 2022-11-01 RX ADMIN — TRAZODONE HYDROCHLORIDE 50 MG: 50 TABLET ORAL at 20:13

## 2022-11-01 RX ADMIN — TIZANIDINE 4 MG: 2 TABLET ORAL at 10:01

## 2022-11-01 RX ADMIN — OXYCODONE AND ACETAMINOPHEN 1 TABLET: 325; 10 TABLET ORAL at 14:31

## 2022-11-01 RX ADMIN — Medication 400 MG: at 20:13

## 2022-11-01 RX ADMIN — Medication 400 MG: at 08:30

## 2022-11-01 RX ADMIN — PANTOPRAZOLE SODIUM 40 MG: 40 TABLET, DELAYED RELEASE ORAL at 06:27

## 2022-11-01 RX ADMIN — CARVEDILOL 12.5 MG: 6.25 TABLET, FILM COATED ORAL at 20:14

## 2022-11-01 RX ADMIN — TOPIRAMATE 100 MG: 100 TABLET, FILM COATED ORAL at 20:14

## 2022-11-01 RX ADMIN — TIZANIDINE 4 MG: 2 TABLET ORAL at 17:26

## 2022-11-01 RX ADMIN — HYDROCODONE BITARTRATE AND ACETAMINOPHEN 1 TABLET: 5; 325 TABLET ORAL at 09:57

## 2022-11-01 ASSESSMENT — PAIN DESCRIPTION - ONSET: ONSET: ON-GOING

## 2022-11-01 ASSESSMENT — PAIN SCALES - GENERAL
PAINLEVEL_OUTOF10: 4
PAINLEVEL_OUTOF10: 6
PAINLEVEL_OUTOF10: 0
PAINLEVEL_OUTOF10: 5
PAINLEVEL_OUTOF10: 4
PAINLEVEL_OUTOF10: 3
PAINLEVEL_OUTOF10: 6

## 2022-11-01 ASSESSMENT — PAIN DESCRIPTION - LOCATION
LOCATION: HIP
LOCATION: BACK
LOCATION: RIB CAGE
LOCATION: GENERALIZED

## 2022-11-01 ASSESSMENT — PAIN DESCRIPTION - DESCRIPTORS
DESCRIPTORS: ACHING;DULL
DESCRIPTORS: ACHING

## 2022-11-01 ASSESSMENT — PAIN DESCRIPTION - FREQUENCY: FREQUENCY: INTERMITTENT

## 2022-11-01 ASSESSMENT — PAIN DESCRIPTION - ORIENTATION
ORIENTATION: RIGHT
ORIENTATION: LEFT

## 2022-11-01 ASSESSMENT — PAIN DESCRIPTION - PAIN TYPE: TYPE: CHRONIC PAIN

## 2022-11-01 NOTE — PROGRESS NOTES
Hospitalist Progress Note   Admit Date:  10/28/2022 10:59 PM   Name:  Bonny Carballo   Age:  69 y.o. Sex:  female  :  1955   MRN:  [id number]   Room:  [facility]/    Presenting Complaint: Altered Mental Status (Was sepsis/ possible uti / bed bound for 3 days not norm)     Reason(s) for Admission: Metabolic encephalopathy [O94.65]  Pneumonia due to organism [J18.9]  JOSE (acute kidney injury) ([facility] [N17.9]  Pneumonia of left lower lobe due to infectious organism [J18.9]  Leukocytosis, unspecified type [facility] Course:       Ms. Julita Person is a 70 yo female with PMH of GBS bacteremia 10,2022 completed amoxil 10-20-22, debility, HTN, DM2, pAFIB, CVA who is evaluated with weakness/ acute encephalopathy. Found with left base pneumonia. CT head negative. UA positive. Cx skin teodoro. She is on course of IV antibiotics. Blood cultures negative. She had JOSE- resolved after being hydrated. Mentation has improved. Passed SLP evaluation. Developed diarrhea that resolved. Developed left hip pain. Negative xrays. Discharge plans pending. Wants to return to SNF        Subjective & 24hr Events (22):       present, would like private rehab room, has 9/10 left hip pain, had for 1 month, points to greater trochanter and radiates to hip, limits walking, diarrhea resolved, has chronic pain,       Assessment & Plan:     Principal Problem:    Pneumonia due to organism  Plan:   Wean O2 as tolerant- currently on room air   D4 rocephin and doxycycline  Followup blood cultures - negative   Consulted SLP-passed         Active Problems:    Chronic pain syndrome  Plan:   Percocet as needed        Metabolic encephalopathy  Plan:   Seems improved  Followup mentation stable          JOSE (acute kidney injury) ([facility]  Plan:   stopped IVF  Followup labs resolved       UTI:  D4 rocephin   Negative  culture      Diabetes mellitus type 2, controlled ([facility]  Plan:   SSI      HTN:  Continued coreg/ hydralazine  Monitor bradycardia         Depression:  Continued chronic meds     Anemia:  Trend HGB- near baseline 10-11    Diarrhea:  Check cdiff and stool cx- if recurrent  Negative KUB      Rectal bleed:  Possible hemorrhoidal  Check occult stool       Left hip pain:  Xray negative  Proceed with CT      Left breast rash:  Mycostatin         Anticipated discharge needs:      Pending  SNF    Diet:  ADULT DIET; Regular  DVT PPx: lovenox  Code status: Full Code    Hospital Problems:  Principal Problem:    Pneumonia due to organism  Active Problems:    Chronic pain syndrome    Metabolic encephalopathy    Pneumonia of left lower lobe due to infectious organism    JOSE (acute kidney injury) ([facility]    Diabetes mellitus type 2, controlled ([facility]  Resolved Problems:    Leukocytosis      Objective:   Patient Vitals for the past 24 hrs:   Temp Pulse Resp BP SpO2   11/01/22 1629 98 °F (36.7 °C) 58 16 (!) 166/73 96 %   11/01/22 1501 -- -- 16 -- --   11/01/22 1431 -- -- 17 -- --   11/01/22 1044 97.7 °F (36.5 °C) (!) 46 18 (!) 113/52 95 %   11/01/22 1027 -- -- 18 -- --   11/01/22 0957 -- -- 19 -- --   11/01/22 0702 98.1 °F (36.7 °C) 57 16 (!) 147/67 94 %   11/01/22 0425 -- -- 16 -- --   11/01/22 0355 98.6 °F (37 °C) 63 16 (!) 169/68 95 %   10/31/22 2334 98.2 °F (36.8 °C) 56 16 (!) 150/71 97 %   10/31/22 2116 -- -- 16 -- --   10/31/22 1932 98.2 °F (36.8 °C) (!) 45 16 (!) 119/45 96 %       Oxygen Therapy  SpO2: 96 %  Pulse Oximeter Device Mode: Intermittent  Pulse Oximeter Device Location: Left, Finger  O2 Device: None (Room air)  O2 Flow Rate (L/min): 3 L/min    Estimated body mass index is 31.32 kg/m² as calculated from the following:    Height as of this encounter: 5' 7\" (1.702 m). Weight as of this encounter: 200 lb (90.7 kg). No intake or output data in the 24 hours ending 11/01/22 1716      Physical Exam:     Blood pressure (!) 166/73, pulse 58, temperature 98 °F (36.7 °C), temperature source Oral, resp.  rate 16, height 5' 7\" (1.702 m), weight 200 lb (90.7 kg), SpO2 96 %. General:    Well nourished. Elderly, no distress , alert, pleasant   CV:   RRR. III/VI KARISSA LUSB  No jugular venous distension. No edema   Lungs:   CTAB. No wheezing, rhonchi, or rales. Symmetric expansion. Anterior   Abdomen: Bowel sounds present. Soft, nontender, nondistended. Extremities: No cyanosis or clubbing. No edema  Skin:     No rashes and normal coloration. Warm and dry.     Neuro:  nonfocal  Psych:  Interactive     I have personally reviewed labs and tests showing:  Recent Labs:  Recent Results (from the past 48 hour(s))   POCT Glucose    Collection Time: 10/30/22  9:02 PM   Result Value Ref Range    POC Glucose 180 (H) 65 - 100 mg/dL    Performed by: Nathalie Portillo    CBC with Auto Differential    Collection Time: 10/31/22  4:24 AM   Result Value Ref Range    WBC 8.6 4.3 - 11.1 K/uL    RBC 3.44 (L) 4.05 - 5.2 M/uL    Hemoglobin 10.1 (L) 11.7 - 15.4 g/dL    Hematocrit 31.7 (L) 35.8 - 46.3 %    MCV 92.2 82.0 - 102.0 FL    MCH 29.4 26.1 - 32.9 PG    MCHC 31.9 31.4 - 35.0 g/dL    RDW 13.6 11.9 - 14.6 %    Platelets 698 330 - 596 K/uL    MPV 9.6 9.4 - 12.3 FL    nRBC 0.00 0.0 - 0.2 K/uL    Differential Type AUTOMATED      Seg Neutrophils 55 43 - 78 %    Lymphocytes 28 13 - 44 %    Monocytes 9 4.0 - 12.0 %    Eosinophils % 4 0.5 - 7.8 %    Basophils 1 0.0 - 2.0 %    Immature Granulocytes 3 0.0 - 5.0 %    Segs Absolute 4.7 1.7 - 8.2 K/UL    Absolute Lymph # 2.4 0.5 - 4.6 K/UL    Absolute Mono # 0.8 0.1 - 1.3 K/UL    Absolute Eos # 0.3 0.0 - 0.8 K/UL    Basophils Absolute 0.1 0.0 - 0.2 K/UL    Absolute Immature Granulocyte 0.3 0.0 - 0.5 K/UL   Basic Metabolic Panel    Collection Time: 10/31/22  4:24 AM   Result Value Ref Range    Sodium 138 133 - 143 mmol/L    Potassium 3.7 3.5 - 5.1 mmol/L    Chloride 107 101 - 110 mmol/L    CO2 24 21 - 32 mmol/L    Anion Gap 7 2 - 11 mmol/L    Glucose 181 (H) 65 - 100 mg/dL    BUN 11 8 - 23 MG/DL    Creatinine 0.52 (L) 0.6 - 1.0 MG/DL    Est, Glom Filt Rate >60 >60 ml/min/1.73m2    Calcium 9.0 8.3 - 10.4 MG/DL   POCT Glucose    Collection Time: 10/31/22  6:18 AM   Result Value Ref Range    POC Glucose 181 (H) 65 - 100 mg/dL    Performed by: Hien Jean-Baptiste    POCT Glucose    Collection Time: 10/31/22  7:42 AM   Result Value Ref Range    POC Glucose 189 (H) 65 - 100 mg/dL    Performed by: Ursula Hyatt    POCT Glucose    Collection Time: 10/31/22 11:37 AM   Result Value Ref Range    POC Glucose 200 (H) 65 - 100 mg/dL    Performed by: Ursula Hyatt    POCT Glucose    Collection Time: 10/31/22  4:44 PM   Result Value Ref Range    POC Glucose 160 (H) 65 - 100 mg/dL    Performed by: Lila    POCT Glucose    Collection Time: 10/31/22  8:55 PM   Result Value Ref Range    POC Glucose 199 (H) 65 - 100 mg/dL    Performed by: Jerardo    CBC with Auto Differential    Collection Time: 11/01/22  4:52 AM   Result Value Ref Range    WBC 7.9 4.3 - 11.1 K/uL    RBC 3.44 (L) 4.05 - 5.2 M/uL    Hemoglobin 10.1 (L) 11.7 - 15.4 g/dL    Hematocrit 31.8 (L) 35.8 - 46.3 %    MCV 92.4 82.0 - 102.0 FL    MCH 29.4 26.1 - 32.9 PG    MCHC 31.8 31.4 - 35.0 g/dL    RDW 13.4 11.9 - 14.6 %    Platelets 445 472 - 595 K/uL    MPV 9.6 9.4 - 12.3 FL    nRBC 0.00 0.0 - 0.2 K/uL    Differential Type AUTOMATED      Seg Neutrophils 51 43 - 78 %    Lymphocytes 30 13 - 44 %    Monocytes 9 4.0 - 12.0 %    Eosinophils % 4 0.5 - 7.8 %    Basophils 1 0.0 - 2.0 %    Immature Granulocytes 5 0.0 - 5.0 %    Segs Absolute 4.0 1.7 - 8.2 K/UL    Absolute Lymph # 2.4 0.5 - 4.6 K/UL    Absolute Mono # 0.7 0.1 - 1.3 K/UL    Absolute Eos # 0.3 0.0 - 0.8 K/UL    Basophils Absolute 0.1 0.0 - 0.2 K/UL    Absolute Immature Granulocyte 0.4 0.0 - 0.5 K/UL   Basic Metabolic Panel    Collection Time: 11/01/22  4:52 AM   Result Value Ref Range    Sodium 138 133 - 143 mmol/L    Potassium 3.6 3.5 - 5.1 mmol/L    Chloride 106 101 - 110 mmol/L    CO2 25 21 - 32 mmol/L    Anion Gap 7 2 - 11 mmol/L    Glucose 171 (H) 65 - 100 mg/dL    BUN 9 8 - 23 MG/DL    Creatinine 0.58 (L) 0.6 - 1.0 MG/DL    Est, Glom Filt Rate >60 >60 ml/min/1.73m2    Calcium 8.8 8.3 - 10.4 MG/DL   POCT Glucose    Collection Time: 11/01/22  6:05 AM   Result Value Ref Range    POC Glucose 159 (H) 65 - 100 mg/dL    Performed by: Jerardo    POCT Glucose    Collection Time: 11/01/22 10:49 AM   Result Value Ref Range    POC Glucose 192 (H) 65 - 100 mg/dL    Performed by: Gina    POCT Glucose    Collection Time: 11/01/22  4:34 PM   Result Value Ref Range    POC Glucose 185 (H) 65 - 100 mg/dL    Performed by: Gina        I have personally reviewed imaging studies showing: Other Studies:  CT HIP LEFT WO CONTRAST   Final Result      1. No evidence of acute bony abnormality. XR HIP 2-3 VW W PELVIS LEFT   Final Result   No acute process evident by x-ray. XR ABDOMEN (KUB) (SINGLE AP VIEW)   Final Result   No acute radiographic abnormality. CT Head W/O Contrast   Final Result   No acute process. XR CHEST PORTABLE   Final Result   New left lung base atelectasis or pneumonia.           Current Meds:  Current Facility-Administered Medications   Medication Dose Route Frequency    oxyCODONE-acetaminophen (PERCOCET)  MG per tablet 1 tablet  1 tablet Oral Q6H PRN    nystatin (MYCOSTATIN) powder   Topical BID PRN    saccharomyces boulardii (FLORASTOR) capsule 250 mg  250 mg Oral BID    tiZANidine (ZANAFLEX) tablet 4 mg  4 mg Oral Q6H PRN    estradiol (ESTRACE) tablet 0.5 mg  0.5 mg Oral Daily    hydrALAZINE (APRESOLINE) tablet 25 mg  25 mg Oral 3 times per day    cetirizine (ZYRTEC) tablet 10 mg  10 mg Oral Daily    pantoprazole (PROTONIX) tablet 40 mg  40 mg Oral QAM AC    sodium chloride flush 0.9 % injection 5-40 mL  5-40 mL IntraVENous 2 times per day    sodium chloride flush 0.9 % injection 5-40 mL  5-40 mL IntraVENous PRN    0.9 % sodium chloride infusion   IntraVENous PRN    [Held by provider] enoxaparin (LOVENOX) injection 40 mg  40 mg SubCUTAneous Daily    ondansetron (ZOFRAN-ODT) disintegrating tablet 4 mg  4 mg Oral Q8H PRN    Or    ondansetron (ZOFRAN) injection 4 mg  4 mg IntraVENous Q6H PRN    polyethylene glycol (GLYCOLAX) packet 17 g  17 g Oral Daily PRN    acetaminophen (TYLENOL) tablet 650 mg  650 mg Oral Q6H PRN    Or    acetaminophen (TYLENOL) suppository 650 mg  650 mg Rectal Q6H PRN    cefTRIAXone (ROCEPHIN) 1,000 mg in sodium chloride 0.9 % 50 mL IVPB mini-bag  1,000 mg IntraVENous Q24H    And    doxycycline hyclate (VIBRAMYCIN) capsule 100 mg  100 mg Oral 2 times per day    insulin lispro (HUMALOG) injection vial 0-8 Units  0-8 Units SubCUTAneous TID WC    insulin lispro (HUMALOG) injection vial 0-4 Units  0-4 Units SubCUTAneous Nightly    carvedilol (COREG) tablet 12.5 mg  12.5 mg Oral BID    gabapentin (NEURONTIN) capsule 300 mg  300 mg Oral TID    atorvastatin (LIPITOR) tablet 10 mg  10 mg Oral Daily    topiramate (TOPAMAX) tablet 100 mg  100 mg Oral BID    traZODone (DESYREL) tablet 50 mg  50 mg Oral Nightly    glucose chewable tablet 16 g  4 tablet Oral PRN    dextrose bolus 10% 125 mL  125 mL IntraVENous PRN    Or    dextrose bolus 10% 250 mL  250 mL IntraVENous PRN    glucagon (rDNA) injection 1 mg  1 mg SubCUTAneous PRN    dextrose 10 % infusion   IntraVENous Continuous PRN    magnesium oxide (MAG-OX) tablet 400 mg  400 mg Oral BID    DULoxetine (CYMBALTA) extended release capsule 60 mg  60 mg Oral Daily    And    DULoxetine (CYMBALTA) extended release capsule 30 mg  30 mg Oral Nightly       Signed:  Jesus Corey MD    Part of this note may have been written by using a voice dictation software. The note has been proof read but may still contain some grammatical/other typographical errors.

## 2022-11-01 NOTE — PROGRESS NOTES
SPEECH LANGUAGE PATHOLOGY: DYSPHAGIA  Initial Assessment and Discharge    NAME: Dominique Hairston  : 1955  MRN: [id number]    ADMISSION DATE: 10/28/2022  PRIMARY DIAGNOSIS: Pneumonia due to organism  Metabolic encephalopathy [M09.47]  Pneumonia due to organism [J18.9]  JOSE (acute kidney injury) ([facility] [N17.9]  Pneumonia of left lower lobe due to infectious organism [J18.9]  Leukocytosis, unspecified type [D72.829]    ICD-10: Treatment Diagnosis: R13.12 Dysphagia, Oropharyngeal Phase    RECOMMENDATIONS   Diet:  Diet Solids Recommendation: Regular  Liquid Consistency Recommendation: Thin    Medications: PO     Recommendations: Setup assist      Compensatory Swallowing Strategies: Upright as possible for all oral intake;Small bites/sips   Patient continues to require skilled intervention: Yes  D/C Recommendations: No follow up therapy recommended post discharge     ASSESSMENT    Dysphagia Diagnosis: Swallow function appears [facility]  Patient consuming solids and liquids without overt s/sx airway compromise. Independently took small bites/sips at slow rate stating is she eats quickly she coughs at times. Reports she was seen by ENT in the past and \"reflux was coming into throat. \" Suspect laryngopharyngeal reflux (LPR). States reflux appears to be managed with omeprazole. Recommend continue regular consistency diet/thin liquids. Meds as tolerated. No further speech therapy needs at this time. Please re-consult if new concern should arise.    GENERAL    History of Present Injury/Illness: Ms. Jyoti Finney  has a past medical history of Arrhythmia, Arthritis, Atrial septal defect, Autoimmune disease ([facility] Chronic pain, Diarrhea, Edema, Fibromyalgia, GERD (gastroesophageal reflux disease), HTN (hypertension), Hypercapnic acidosis, Hyperglycemia, Major depressive disorder, recurrent ([facility] Obesity, Class III, BMI 40-49.9 (morbid obesity) ([facility] Opioid dependence ([facility] Palpitations, Paroxysmal atrial fibrillation ([facility] Preop cardiovascular exam, Primary hypothyroidism, Prolonged emergence from general anesthesia, Psychiatric disorder, Respiratory failure ([address] Stroke ([address] Thyroid disease, TIA (transient ischemic attack), Type 2 diabetes mellitus without complication ([address] Typical atrial flutter ([address] Unspecified adverse effect of anesthesia, Unspecified sleep apnea, and Weakness. . She also  has a past surgical history that includes Colonoscopy (N/A, 5/29/2018); Cardiac catheterization; Colonoscopy (N/A, 12/5/2017); Total abdominal hysterectomy w/ bilateral salpingoophorectomy; Gastric bypass surgery; and Stimulator Surgery (N/A, 6/23/2022). Chart Reviewed: Yes  Subjective: Alert upright in bed for session. Agreeable to assessment. Reports she is sore, but feeling better today. Behavior/Cognition: Alert; Cooperative  Communication Observation: Functional  Follows Directions: Simple          Current Diet : Regular  Current Liquid Diet : Thin    Respiratory Status: Room air              Pain:   Patient does not c/o pain                                        OBJECTIVE    Patient Positioning: Upright in bed   Oral Motor   Labial: No impairment  Dentition: Intact  Oral Hygiene: Moist  Lingual: No impairment  Dentition: Adequate        Baseline Vocal Quality: Normal    Oropharyngeal Phase:  Patient consumed single sips of thin liquids by cup/straw, mixed fruit, and cracker. Functional oral prep and clearing with all textures. No overt s/sx airway compromise with solids or liquids.                      PLAN    Duration/Frequency: No treatment indicated at this time    Dysphagia Outcome and Severity Scale (TAJ)  Dysphagia Outcome Severity Scale: Level 6: Within functional limits/Modified independence  Interpretation of Tool: The Dysphagia Outcome and Severity Scale (TAJ) is a simple, easy-to-use, 7-point scale developed to systematically rate the functional severity of dysphagia based on objective assessment and make recommendations for diet level, independence level, and type of nutrition.    Normal(7), Functional(6), Mild(5), Mild-Moderate(4), Moderate(3), Moderate-Severe(2), Severe(1)    Speech Therapy Prognosis  Prognosis: Excellent    Education: Patient  Patient Education: safe swallowing precautions, SLP role  Patient Education Response: Verbalizes understanding, Demonstrated understanding    PRECAUTIONS/ALLERGIES: Latex, Benzodiazepines, Diltiazem, Nsaids, and Amlodipine   Safety Devices in place: Yes  Type of devices: Left in bed, Call light within reach  Restraints Initially in Place: No    Therapy Time  SLP Individual Minutes  Time In: 0940  Time Out: 8142  Minutes: [address] [place], [place]  11/1/2022 11:07 AM

## 2022-11-01 NOTE — CARE COORDINATION
CM met with patient to discuss SNF options and she wanted [facility] in [place] but they do not have a bed and she has bed offers for [facility], [facility], [facility] and [facility]. Patient has decided she wants to go to [facility] and bed was selected. Patient to d/c to [facility] when medically stable.

## 2022-11-02 ENCOUNTER — APPOINTMENT (OUTPATIENT)
Dept: GENERAL RADIOLOGY | Age: 67
DRG: 193 | End: 2022-11-02
Payer: MEDICARE

## 2022-11-02 VITALS
TEMPERATURE: 98.1 F | BODY MASS INDEX: 31.39 KG/M2 | RESPIRATION RATE: 17 BRPM | DIASTOLIC BLOOD PRESSURE: 75 MMHG | HEART RATE: 60 BPM | SYSTOLIC BLOOD PRESSURE: 163 MMHG | WEIGHT: 200 LBS | HEIGHT: 67 IN | OXYGEN SATURATION: 97 %

## 2022-11-02 PROBLEM — N17.9 AKI (ACUTE KIDNEY INJURY) (HCC): Status: RESOLVED | Noted: 2022-10-29 | Resolved: 2022-11-02

## 2022-11-02 PROBLEM — G93.41 METABOLIC ENCEPHALOPATHY: Status: RESOLVED | Noted: 2022-10-29 | Resolved: 2022-11-02

## 2022-11-02 LAB
ANION GAP SERPL CALC-SCNC: 4 MMOL/L (ref 2–11)
BASOPHILS # BLD: 0.2 K/UL (ref 0–0.2)
BASOPHILS NFR BLD: 2 % (ref 0–2)
BUN SERPL-MCNC: 9 MG/DL (ref 8–23)
CALCIUM SERPL-MCNC: 9.2 MG/DL (ref 8.3–10.4)
CHLORIDE SERPL-SCNC: 105 MMOL/L (ref 101–110)
CO2 SERPL-SCNC: 28 MMOL/L (ref 21–32)
CREAT SERPL-MCNC: 0.69 MG/DL (ref 0.6–1)
DIFFERENTIAL METHOD BLD: ABNORMAL
EOSINOPHIL # BLD: 0.3 K/UL (ref 0–0.8)
EOSINOPHIL NFR BLD: 3 % (ref 0.5–7.8)
ERYTHROCYTE [DISTWIDTH] IN BLOOD BY AUTOMATED COUNT: 13.6 % (ref 11.9–14.6)
GLUCOSE BLD STRIP.AUTO-MCNC: 184 MG/DL (ref 65–100)
GLUCOSE BLD STRIP.AUTO-MCNC: 189 MG/DL (ref 65–100)
GLUCOSE SERPL-MCNC: 161 MG/DL (ref 65–100)
HCT VFR BLD AUTO: 38.3 % (ref 35.8–46.3)
HGB BLD-MCNC: 12.1 G/DL (ref 11.7–15.4)
IMM GRANULOCYTES # BLD AUTO: 0.5 K/UL (ref 0–0.5)
IMM GRANULOCYTES NFR BLD AUTO: 5 % (ref 0–5)
LYMPHOCYTES # BLD: 3.6 K/UL (ref 0.5–4.6)
LYMPHOCYTES NFR BLD: 36 % (ref 13–44)
MCH RBC QN AUTO: 29.2 PG (ref 26.1–32.9)
MCHC RBC AUTO-ENTMCNC: 31.6 G/DL (ref 31.4–35)
MCV RBC AUTO: 92.5 FL (ref 82–102)
MONOCYTES # BLD: 0.9 K/UL (ref 0.1–1.3)
MONOCYTES NFR BLD: 9 % (ref 4–12)
NEUTS SEG # BLD: 4.4 K/UL (ref 1.7–8.2)
NEUTS SEG NFR BLD: 45 % (ref 43–78)
NRBC # BLD: 0 K/UL (ref 0–0.2)
PLATELET # BLD AUTO: 319 K/UL (ref 150–450)
PLATELET COMMENT: ADEQUATE
PMV BLD AUTO: 9.2 FL (ref 9.4–12.3)
POTASSIUM SERPL-SCNC: 3.6 MMOL/L (ref 3.5–5.1)
RBC # BLD AUTO: 4.14 M/UL (ref 4.05–5.2)
RBC MORPH BLD: ABNORMAL
SARS-COV-2 RDRP RESP QL NAA+PROBE: NOT DETECTED
SERVICE CMNT-IMP: ABNORMAL
SERVICE CMNT-IMP: ABNORMAL
SODIUM SERPL-SCNC: 137 MMOL/L (ref 133–143)
SOURCE: NORMAL
WBC # BLD AUTO: 9.9 K/UL (ref 4.3–11.1)
WBC MORPH BLD: ABNORMAL

## 2022-11-02 PROCEDURE — 6370000000 HC RX 637 (ALT 250 FOR IP): Performed by: INTERNAL MEDICINE

## 2022-11-02 PROCEDURE — 85025 COMPLETE CBC W/AUTO DIFF WBC: CPT

## 2022-11-02 PROCEDURE — 80048 BASIC METABOLIC PNL TOTAL CA: CPT

## 2022-11-02 PROCEDURE — 87635 SARS-COV-2 COVID-19 AMP PRB: CPT

## 2022-11-02 PROCEDURE — 6360000002 HC RX W HCPCS: Performed by: HOSPITALIST

## 2022-11-02 PROCEDURE — 36415 COLL VENOUS BLD VENIPUNCTURE: CPT

## 2022-11-02 PROCEDURE — 71045 X-RAY EXAM CHEST 1 VIEW: CPT

## 2022-11-02 PROCEDURE — 6370000000 HC RX 637 (ALT 250 FOR IP): Performed by: HOSPITALIST

## 2022-11-02 PROCEDURE — 82962 GLUCOSE BLOOD TEST: CPT

## 2022-11-02 PROCEDURE — 2580000003 HC RX 258: Performed by: HOSPITALIST

## 2022-11-02 RX ORDER — CEFPODOXIME PROXETIL 200 MG/1
200 TABLET, FILM COATED ORAL 2 TIMES DAILY
Qty: 5 TABLET | Refills: 0
Start: 2022-11-02 | End: 2022-11-05

## 2022-11-02 RX ORDER — LORATADINE 10 MG/1
10 TABLET ORAL DAILY
Qty: 30 TABLET | Refills: 0
Start: 2022-11-02

## 2022-11-02 RX ORDER — NYSTATIN 100000 [USP'U]/G
POWDER TOPICAL
Qty: 1 EACH | Refills: 0
Start: 2022-11-02

## 2022-11-02 RX ORDER — FOLIC ACID/MULTIVIT,IRON,MINER .4-18-35
1 TABLET,CHEWABLE ORAL DAILY
Qty: 30 TABLET | Refills: 3
Start: 2022-11-02

## 2022-11-02 RX ORDER — PRENATAL VIT/IRON FUM/FOLIC AC 27MG-0.8MG
1 TABLET ORAL DAILY
Status: DISCONTINUED | OUTPATIENT
Start: 2022-11-02 | End: 2022-11-02

## 2022-11-02 RX ORDER — CARVEDILOL 6.25 MG/1
6.25 TABLET ORAL 2 TIMES DAILY
Qty: 60 TABLET | Refills: 0
Start: 2022-11-02

## 2022-11-02 RX ORDER — SACCHAROMYCES BOULARDII 250 MG
250 CAPSULE ORAL 2 TIMES DAILY
Qty: 14 CAPSULE | Refills: 0
Start: 2022-11-02 | End: 2022-11-09

## 2022-11-02 RX ORDER — FOLIC ACID/MULTIVIT,IRON,MINER .4-18-35
1 TABLET,CHEWABLE ORAL DAILY
Status: DISCONTINUED | OUTPATIENT
Start: 2022-11-02 | End: 2022-11-02 | Stop reason: HOSPADM

## 2022-11-02 RX ORDER — OXYCODONE AND ACETAMINOPHEN 10; 325 MG/1; MG/1
1 TABLET ORAL EVERY 6 HOURS PRN
Qty: 10 TABLET | Refills: 0 | Status: SHIPPED | OUTPATIENT
Start: 2022-11-02 | End: 2022-11-05

## 2022-11-02 RX ORDER — NALOXONE HYDROCHLORIDE 4 MG/.1ML
1 SPRAY NASAL PRN
Qty: 1 EACH | Refills: 0
Start: 2022-11-02

## 2022-11-02 RX ORDER — DOXYCYCLINE HYCLATE 100 MG/1
100 CAPSULE ORAL EVERY 12 HOURS SCHEDULED
Qty: 5 CAPSULE | Refills: 0
Start: 2022-11-02 | End: 2022-11-05

## 2022-11-02 RX ORDER — MULTIVITAMIN WITH IRON
1 TABLET ORAL DAILY
Status: DISCONTINUED | OUTPATIENT
Start: 2022-11-02 | End: 2022-11-02

## 2022-11-02 RX ORDER — LANOLIN ALCOHOL/MO/W.PET/CERES
400 CREAM (GRAM) TOPICAL 2 TIMES DAILY
Qty: 60 TABLET | Refills: 0
Start: 2022-11-02

## 2022-11-02 RX ORDER — CARVEDILOL 6.25 MG/1
6.25 TABLET ORAL 2 TIMES DAILY
Status: DISCONTINUED | OUTPATIENT
Start: 2022-11-02 | End: 2022-11-02 | Stop reason: HOSPADM

## 2022-11-02 RX ORDER — CELECOXIB 100 MG/1
100 CAPSULE ORAL 2 TIMES DAILY
Status: DISCONTINUED | OUTPATIENT
Start: 2022-11-02 | End: 2022-11-02 | Stop reason: HOSPADM

## 2022-11-02 RX ORDER — HYDRALAZINE HYDROCHLORIDE 25 MG/1
25 TABLET, FILM COATED ORAL EVERY 8 HOURS SCHEDULED
Qty: 90 TABLET | Refills: 0
Start: 2022-11-02

## 2022-11-02 RX ORDER — INSULIN LISPRO 100 [IU]/ML
0-8 INJECTION, SOLUTION INTRAVENOUS; SUBCUTANEOUS
Qty: 1 EACH | Refills: 0
Start: 2022-11-02

## 2022-11-02 RX ADMIN — OXYCODONE AND ACETAMINOPHEN 1 TABLET: 325; 10 TABLET ORAL at 11:56

## 2022-11-02 RX ADMIN — CARVEDILOL 6.25 MG: 6.25 TABLET, FILM COATED ORAL at 09:31

## 2022-11-02 RX ADMIN — PANTOPRAZOLE SODIUM 40 MG: 40 TABLET, DELAYED RELEASE ORAL at 05:56

## 2022-11-02 RX ADMIN — TIZANIDINE 4 MG: 2 TABLET ORAL at 12:22

## 2022-11-02 RX ADMIN — HYDRALAZINE HYDROCHLORIDE 25 MG: 25 TABLET, FILM COATED ORAL at 05:56

## 2022-11-02 RX ADMIN — OXYCODONE AND ACETAMINOPHEN 1 TABLET: 325; 10 TABLET ORAL at 05:56

## 2022-11-02 RX ADMIN — SODIUM CHLORIDE, PRESERVATIVE FREE 10 ML: 5 INJECTION INTRAVENOUS at 09:32

## 2022-11-02 RX ADMIN — CETIRIZINE HYDROCHLORIDE 10 MG: 10 TABLET ORAL at 09:31

## 2022-11-02 RX ADMIN — CELECOXIB 100 MG: 100 CAPSULE ORAL at 11:56

## 2022-11-02 RX ADMIN — Medication 1 TABLET: at 11:56

## 2022-11-02 RX ADMIN — ESTRADIOL 0.5 MG: 1 TABLET ORAL at 09:30

## 2022-11-02 RX ADMIN — CEFTRIAXONE 1000 MG: 1 INJECTION, POWDER, FOR SOLUTION INTRAMUSCULAR; INTRAVENOUS at 03:00

## 2022-11-02 RX ADMIN — TOPIRAMATE 100 MG: 100 TABLET, FILM COATED ORAL at 09:31

## 2022-11-02 RX ADMIN — DOXYCYCLINE HYCLATE 100 MG: 100 CAPSULE ORAL at 09:31

## 2022-11-02 RX ADMIN — TIZANIDINE 4 MG: 2 TABLET ORAL at 04:02

## 2022-11-02 RX ADMIN — Medication 250 MG: at 09:30

## 2022-11-02 RX ADMIN — Medication 400 MG: at 09:30

## 2022-11-02 RX ADMIN — DULOXETINE HYDROCHLORIDE 60 MG: 60 CAPSULE, DELAYED RELEASE ORAL at 09:30

## 2022-11-02 RX ADMIN — GABAPENTIN 300 MG: 300 CAPSULE ORAL at 09:31

## 2022-11-02 RX ADMIN — ATORVASTATIN CALCIUM 10 MG: 10 TABLET, FILM COATED ORAL at 09:31

## 2022-11-02 ASSESSMENT — PAIN DESCRIPTION - DESCRIPTORS
DESCRIPTORS: ACHING
DESCRIPTORS: ACHING

## 2022-11-02 ASSESSMENT — PAIN SCALES - GENERAL
PAINLEVEL_OUTOF10: 4
PAINLEVEL_OUTOF10: 7
PAINLEVEL_OUTOF10: 4
PAINLEVEL_OUTOF10: 2

## 2022-11-02 ASSESSMENT — PAIN DESCRIPTION - LOCATION
LOCATION: BACK
LOCATION: GENERALIZED

## 2022-11-02 NOTE — CARE COORDINATION
Sw spoke with Md during care rounds. Pt is medically stable and ready for SNF today. Rn called report. Md ordered a new C X R to rule out TB. Ambulance arranged and Sw talked to pt and  to inform of room number and location of facility pt chose. Packet complete with updated d/c summary and scripts.   Mendy Carye

## 2022-11-02 NOTE — PROGRESS NOTES
Pt discharge summary report called to [address] at [facility]. Pt waiting for EMS transport to facility.  at bedside.

## 2022-11-02 NOTE — DISCHARGE SUMMARY
Hospitalist Discharge Summary   Admit Date:  10/28/2022 10:59 PM   DC Note date: 2022  Name:  Mary Shane   Age:  69 y.o. Sex:  female  :  1955   MRN:  [id number]   Room:  [facility]  PCP:  Herb Cannon MD    Presenting Complaint: Altered Mental Status (Was sepsis/ possible uti / bed bound for 3 days not norm)     Initial Admission Diagnosis: Metabolic encephalopathy [F92.95]  Pneumonia due to organism [J18.9]  JOSE (acute kidney injury) ([facility] [address] [N17.9]  Pneumonia of left lower lobe due to infectious organism [J18.9]  Leukocytosis, unspecified type [D72.829]     Problem List for this Hospitalization (present on admission):    Principal Problem:    Pneumonia due to organism  Active Problems:    Chronic pain syndrome    Pneumonia of left lower lobe due to infectious organism    Diabetes mellitus type 2, controlled ([facility] [address]  Resolved Problems:    Metabolic encephalopathy    JOSE (acute kidney injury) ([facility] [address]    Leukocytosis      Hospital Course:    Ms. Hugh Galarza is a 68 yo female with PMH of GBS bacteremia 10,2022 completed amoxil 10-20-22, debility, HTN, DM2, pAFIB, CVA who is evaluated with weakness/ acute encephalopathy. Found with left base pneumonia. CT head negative. UA positive. Cx skin teodoro. She is on course of IV antibiotics. She will have 7 days total and complete her course with vantin and doxycycline. Blood cultures negative. She had JOSE- resolved after being hydrated. Mentation has improved. Passed SLP evaluation. Developed diarrhea that resolved. Developed left hip pain. Negative xrays. CT left hip negative. She is continued on her home pain control regimen. She has a stable anemia and mentions she is s/p gastric bypass. She is continued on her vitamins. Her hemoglobin should be followed at [facility]. She had one episode of rectal bleeding and she admits to hemorrhoids. Occult stool still pending. Her blood pressure is stable but medications are decreased.  She will need close followup to determine need for medication titration. Discharge plans  to SNF. Disposition: home       Diet: ADULT DIET; Regular  Code Status: Full Code    Follow Ups:   Contact information for after-discharge care     Discharge [facility]) . Service: Skilled Nursing  Contact information:  [address] [place] [phone]  [phone]                           Time spent in patient discharge and coordination 35  minutes. Follow up labs/diagnostics (ultimately defer to outpatient provider):        Blood pressure  CBC    Plan was discussed with patient . All questions answered. Patient was stable at time of discharge. Instructions given to call a physician or return if any concerns. Current Discharge Medication List        START taking these medications    Details   oxyCODONE-acetaminophen (PERCOCET)  MG per tablet Take 1 tablet by mouth every 6 hours as needed for Pain for up to 3 days. Qty: 10 tablet, Refills: 0    Comments: Reduce doses taken as pain becomes manageable  Associated Diagnoses: Chronic pain syndrome      magnesium oxide (MAG-OX) 400 (240 Mg) MG tablet Take 1 tablet by mouth 2 times daily  Qty: 60 tablet, Refills: 0      saccharomyces boulardii (FLORASTOR) 250 MG capsule Take 1 capsule by mouth 2 times daily for 7 days  Qty: 14 capsule, Refills: 0      doxycycline hyclate (VIBRAMYCIN) 100 MG capsule Take 1 capsule by mouth every 12 hours for 5 doses  Qty: 5 capsule, Refills: 0      cefpodoxime (VANTIN) 200 MG tablet Take 1 tablet by mouth 2 times daily for 5 doses  Qty: 5 tablet, Refills: 0      nystatin (MYCOSTATIN) 240891 UNIT/GM powder Apply topically 4 times daily.   Qty: 1 each, Refills: 0      multivitamin-iron-minerals-folic acid (CENTRUM) chewable tablet Take 1 tablet by mouth daily  Qty: 30 tablet, Refills: 3      naloxone (NARCAN) 4 MG/0.1ML LIQD nasal spray 1 spray by Nasal route as needed for Opioid Reversal  Qty: 1 each, Refills: 0      insulin lispro (HUMALOG) 100 UNIT/ML SOLN injection vial Inject 0-8 Units into the skin 3 times daily (with meals) Medium Dose Corrective Algorithm  Glucose: Dose:    No Insulin  200-249 2 Units  250-299 4 Units  300-349 6 Units  Over 349 8 Units and notify physician  Qty: 1 each, Refills: 0           CONTINUE these medications which have CHANGED    Details   loratadine (CLARITIN) 10 MG tablet Take 1 tablet by mouth daily Strength: 10 mg; Form: tablet; SIG: take 1 tab(s) orally once a day  Qty: 30 tablet, Refills: 0      hydrALAZINE (APRESOLINE) 25 MG tablet Take 1 tablet by mouth every 8 hours  Qty: 90 tablet, Refills: 0      carvedilol (COREG) 6.25 MG tablet Take 1 tablet by mouth 2 times daily  Qty: 60 tablet, Refills: 0           CONTINUE these medications which have NOT CHANGED    Details   traZODone (DESYREL) 50 MG tablet Take 1 tablet by mouth nightly  Qty: 3 tablet, Refills: 0      tiZANidine (ZANAFLEX) 4 MG tablet Take 1 tablet by mouth every 6 hours as needed (Back spasms)  Qty: 12 tablet, Refills: 0      pitavastatin (LIVALO) 2 MG TABS tablet Take 2 mg by mouth daily      gabapentin (NEURONTIN) 300 MG capsule Take 300 mg by mouth 3 times daily. !! DULoxetine (CYMBALTA) 60 MG extended release capsule Take 60 mg by mouth daily      celecoxib (CELEBREX) 200 MG capsule Take 200 mg by mouth 2 times daily      !! DULoxetine (CYMBALTA) 30 MG extended release capsule Take 30 mg by mouth at bedtime       estradiol (ESTRACE) 0.5 MG tablet Take 0.5 mg by mouth daily       ezetimibe (ZETIA) 10 MG tablet Take 10 mg by mouth daily      levothyroxine (SYNTHROID) 125 MCG tablet Take by mouth every morning (before breakfast)      omeprazole (PRILOSEC) 20 MG delayed release capsule Take 20 mg by mouth 2 times daily      topiramate (TOPAMAX) 100 MG tablet Take by mouth 2 times daily       ! ! - Potential duplicate medications found. Please discuss with provider. STOP taking these medications       SUMAtriptan (IMITREX) 100 MG tablet Comments:   Reason for Stopping:         ondansetron (ZOFRAN-ODT) 4 MG disintegrating tablet Comments:   Reason for Stopping:         spironolactone (ALDACTONE) 25 MG tablet Comments:   Reason for Stopping:         ascorbic acid (VITAMIN C) 250 MG tablet Comments:   Reason for Stopping:         aspirin 81 MG chewable tablet Comments:   Reason for Stopping:         chlorthalidone (HYGROTON) 25 MG tablet Comments:   Reason for Stopping:         vitamin D3 (CHOLECALCIFEROL) 125 MCG (5000 UT) TABS tablet Comments:   Reason for Stopping:         coenzyme Q10 200 MG CAPS capsule Comments:   Reason for Stopping:         ferrous sulfate (IRON 325) 325 (65 Fe) MG tablet Comments:   Reason for Stopping:         insulin NPH (HUMULIN N;NOVOLIN N) 100 UNIT/ML injection vial Comments:   Reason for Stopping:         insulin regular (HUMULIN R;NOVOLIN R) 100 UNIT/ML injection Comments:   Reason for Stopping:         ketoconazole (NIZORAL) 2 % cream Comments:   Reason for Stopping:         Lidocaine HCl 2.75 % LOTN Comments:   Reason for Stopping:         lisinopril (PRINIVIL;ZESTRIL) 40 MG tablet Comments:   Reason for Stopping:         magnesium oxide (MAG-OX) 400 MG tablet Comments:   Reason for Stopping:         ondansetron (ZOFRAN) 4 MG tablet Comments:   Reason for Stopping:               Procedures done this admission:  * No surgery found *    Consults this admission:  [address]    Echocardiogram results:  10/04/22    TRANSTHORACIC ECHOCARDIOGRAM (TTE) COMPLETE (CONTRAST/BUBBLE/3D PRN) 10/06/2022 12:33 PM, 10/06/2022 12:00 AM (Final)    Interpretation Summary    Left Ventricle: Normal left ventricular systolic function with a visually estimated EF of 55 - 60%. Left ventricle size is normal. Normal wall thickness. Normal wall motion. Abnormal diastolic function. Mitral Valve: Mild regurgitation.     Left Atrium: Left atrium is moderately dilated. Technical qualifiers: Color flow Doppler was performed and pulse wave and/or continuous wave Doppler was performed. Contrast used: Definity. Signed by: Jason Araujo MD on 10/6/2022 12:33 PM, Signed by: Unknown Provider Result on 10/6/2022 12:00 AM      Diagnostic Imaging/Tests:   XR ABDOMEN (KUB) (SINGLE AP VIEW)    Result Date: 10/31/2022  No acute radiographic abnormality. CT Head W/O Contrast    Result Date: 10/29/2022  No acute process. CT SOFT TISSUE NECK W CONTRAST    Result Date: 10/6/2022  CT NECK: 1. No acute process in the neck. 2. Multilevel cervical spondylosis with degenerative grade 1 anterolisthesis of C3 on C4. No acute cervical spine abnormality. CT CHEST: 1. Interval resolution of the fluid collection along the spinal cord stimulator leads. 2. Hypoventilatory exam with bronchovascular crowding and perihilar/dependent subsegmental atelectasis. No consolidation to suggest pneumonia. 3. Cardiomegaly with atherosclerosis including coronary artery involvement. CT CHEST W CONTRAST    Result Date: 10/6/2022  CT NECK: 1. No acute process in the neck. 2. Multilevel cervical spondylosis with degenerative grade 1 anterolisthesis of C3 on C4. No acute cervical spine abnormality. CT CHEST: 1. Interval resolution of the fluid collection along the spinal cord stimulator leads. 2. Hypoventilatory exam with bronchovascular crowding and perihilar/dependent subsegmental atelectasis. No consolidation to suggest pneumonia. 3. Cardiomegaly with atherosclerosis including coronary artery involvement. CT ABDOMEN PELVIS W IV CONTRAST Additional Contrast? None    Result Date: 10/5/2022  The gallbladder is dilated. It measures up to 6.1 cm transversely. It contains at least one radiopaque stone. There is no appreciable pericholecystic fluid or obvious wall thickening. Spinal stimulator leads are in place at the level of the thoracic spine.  There is a small fluid collection surrounding the subcutaneous portion. The patient is status post gastric bypass. XR CHEST PORTABLE    Result Date: 10/29/2022  New left lung base atelectasis or pneumonia. XR CHEST PORTABLE    Result Date: 10/4/2022  1. Limited study without significant acute changes evident. CT HIP LEFT WO CONTRAST    Result Date: 11/1/2022  1. No evidence of acute bony abnormality. XR HIP 2-3 VW W PELVIS LEFT    Result Date: 10/31/2022  No acute process evident by x-ray. Labs: Results:       BMP, Mg, Phos Recent Labs     10/31/22  0424 11/01/22  0452 11/02/22  0413    138 137   K 3.7 3.6 3.6    106 105   CO2 24 25 28   ANIONGAP 7 7 4   BUN 11 9 9   CREATININE 0.52* 0.58* 0.69   LABGLOM >60 >60 >60   CALCIUM 9.0 8.8 9.2   GLUCOSE 181* 171* 161*      CBC Recent Labs     10/31/22  0424 11/01/22  0452 11/02/22  0413   WBC 8.6 7.9 9.9   RBC 3.44* 3.44* 4.14   HGB 10.1* 10.1* 12.1   HCT 31.7* 31.8* 38.3   MCV 92.2 92.4 92.5   MCH 29.4 29.4 29.2   MCHC 31.9 31.8 31.6   RDW 13.6 13.4 13.6    320 319   MPV 9.6 9.6 9.2*   NRBC 0.00 0.00 0.00   SEGS 55 51 45   LYMPHOPCT 28 30 36   EOSRELPCT 4 4 3   MONOPCT 9 9 9   BASOPCT 1 1 2   IMMGRAN 3 5 5   SEGSABS 4.7 4.0 4.4   LYMPHSABS 2.4 2.4 3.6   EOSABS 0.3 0.3 0.3   MONOSABS 0.8 0.7 0.9   BASOSABS 0.1 0.1 0.2   ABSIMMGRAN 0.3 0.4 0.5      LFT No results for input(s): BILITOT, BILIDIR, ALKPHOS, AST, ALT, PROT, LABALBU, GLOB in the last 72 hours.    Cardiac  Lab Results   Component Value Date/Time    TROPHS 25.4 10/28/2022 11:51 PM      Coags No results found for: PROTIME, INR, APTT   A1c Lab Results   Component Value Date/Time    LABA1C 6.8 06/16/2022 12:32 PM     06/16/2022 12:32 PM      Lipids No results found for: CHOL, LDLCALC, LABVLDL, HDL, CHOLHDLRATIO, TRIG   Thyroid  No results found for: Radha Carvalho     Most Recent UA Lab Results   Component Value Date/Time    COLORU DARK YELLOW 10/29/2022 01:58 AM    APPEARANCE CLOUDY 10/29/2022 01:58 AM    SPECGRAV 1.026 10/29/2022 01:58 AM    LABPH 5.0 10/29/2022 01:58 AM    PROTEINU 30 10/29/2022 01:58 AM    GLUCOSEU Negative 10/29/2022 01:58 AM    KETUA TRACE 10/29/2022 01:58 AM    BILIRUBINUR MODERATE 10/29/2022 01:58 AM    BLOODU Negative 10/29/2022 01:58 AM    UROBILINOGEN 0.2 10/29/2022 01:58 AM    NITRU Negative 10/29/2022 01:58 AM    LEUKOCYTESUR TRACE 10/29/2022 01:58 AM    WBCUA 0-3 10/29/2022 01:58 AM    RBCUA 0-3 10/29/2022 01:58 AM    EPITHUA 5-10 10/29/2022 01:58 AM    BACTERIA 2+ 10/29/2022 01:58 AM    LABCAST 0 10/29/2022 01:58 AM    MUCUS 0 10/29/2022 01:58 AM        Recent Labs     10/29/22  0158 10/28/22  2351 10/08/22  0501 10/08/22  0455 10/06/22  0446   CULTURE 10,000 to 50,000 COLONIES/mL MIXED SKIN QI ISOLATED NO GROWTH 4 DAYS  NO GROWTH 4 DAYS NO GROWTH 5 DAYS NO GROWTH 5 DAYS STREPTOCOCCI, BETA HEMOLYTIC*  For identification and susceptibility refer to culture [facility] [id number]       All Labs from Last 24 Hrs:  Recent Results (from the past 24 hour(s))   POCT Glucose    Collection Time: 11/01/22  4:34 PM   Result Value Ref Range    POC Glucose 185 (H) 65 - 100 mg/dL    Performed by: Gina    POCT Glucose    Collection Time: 11/01/22  9:24 PM   Result Value Ref Range    POC Glucose 193 (H) 65 - 100 mg/dL    Performed by: Jerardo    CBC with Auto Differential    Collection Time: 11/02/22  4:13 AM   Result Value Ref Range    WBC 9.9 4.3 - 11.1 K/uL    RBC 4.14 4.05 - 5.2 M/uL    Hemoglobin 12.1 11.7 - 15.4 g/dL    Hematocrit 38.3 35.8 - 46.3 %    MCV 92.5 82.0 - 102.0 FL    MCH 29.2 26.1 - 32.9 PG    MCHC 31.6 31.4 - 35.0 g/dL    RDW 13.6 11.9 - 14.6 %    Platelets 812 116 - 756 K/uL    MPV 9.2 (L) 9.4 - 12.3 FL    nRBC 0.00 0.0 - 0.2 K/uL    Seg Neutrophils 45 43 - 78 %    Lymphocytes 36 13 - 44 %    Monocytes 9 4.0 - 12.0 %    Eosinophils % 3 0.5 - 7.8 %    Basophils 2 0.0 - 2.0 %    Immature Granulocytes 5 0.0 - 5.0 %    Segs Absolute 4.4 1.7 - 8.2 K/UL    Absolute Lymph # 3.6 0.5 - 4.6 K/UL    Absolute Mono # 0.9 0.1 - 1.3 K/UL    Absolute Eos # 0.3 0.0 - 0.8 K/UL    Basophils Absolute 0.2 0.0 - 0.2 K/UL    Absolute Immature Granulocyte 0.5 0.0 - 0.5 K/UL    RBC Comment NORMOCYTIC/NORMOCHROMIC      WBC Comment Result Confirmed By Smear      Platelet Comment ADEQUATE      Differential Type AUTOMATED     Basic Metabolic Panel    Collection Time: 11/02/22  4:13 AM   Result Value Ref Range    Sodium 137 133 - 143 mmol/L    Potassium 3.6 3.5 - 5.1 mmol/L    Chloride 105 101 - 110 mmol/L    CO2 28 21 - 32 mmol/L    Anion Gap 4 2 - 11 mmol/L    Glucose 161 (H) 65 - 100 mg/dL    BUN 9 8 - 23 MG/DL    Creatinine 0.69 0.6 - 1.0 MG/DL    Est, Glom Filt Rate >60 >60 ml/min/1.73m2    Calcium 9.2 8.3 - 10.4 MG/DL   POCT Glucose    Collection Time: 11/02/22  5:57 AM   Result Value Ref Range    POC Glucose 184 (H) 65 - 100 mg/dL    Performed by: Elida Brambila    COVID-19, Rapid    Collection Time: 11/02/22  9:44 AM    Specimen: Nasopharyngeal   Result Value Ref Range    Source Nasopharyngeal      SARS-CoV-2, Rapid Not detected NOTD     POCT Glucose    Collection Time: 11/02/22 11:57 AM   Result Value Ref Range    POC Glucose 189 (H) 65 - 100 mg/dL    Performed by: Danielle        Allergies   Allergen Reactions    Latex Rash     itching    Benzodiazepines Other (See Comments)     Dr. Elzie Heimlich didn't wont medication combined with other medications pt was taking    Diltiazem Other (See Comments)     Other reaction(s): Headache-Intolerance    Nsaids Other (See Comments) and Swelling     Other reaction(s):  Other- (not listed) - Allergy  Elevated Blood Pressure; leg edema  Elevated Blood Pressure; leg edema      Amlodipine Other (See Comments)     Other reaction(s): Headache-Intolerance     Immunization History   Administered Date(s) Administered    COVID-19, PFIZER PURPLE top, DILUTE for use, (age 15 y+), 30mcg/0.3mL 03/01/2021, 03/29/2021, 11/23/2021    PPD Test 10/05/2022, 10/29/2022, 11/01/2022       Recent Vital Data:  Patient Vitals for the past 24 hrs:   Temp Pulse Resp BP SpO2   11/02/22 1201 -- 60 17 (!) 163/75 97 %   11/02/22 0729 98.1 °F (36.7 °C) 55 17 (!) 135/58 95 %   11/02/22 0342 97.9 °F (36.6 °C) 58 -- (!) 170/70 96 %   11/01/22 2322 98.6 °F (37 °C) 59 -- (!) 151/67 96 %   11/01/22 1959 98.4 °F (36.9 °C) (!) 49 -- (!) 148/69 98 %   11/01/22 1629 98 °F (36.7 °C) 58 16 (!) 166/73 96 %   11/01/22 1501 -- -- 16 -- --   11/01/22 1431 -- -- 17 -- --       Oxygen Therapy  SpO2: 97 %  Pulse Oximeter Device Mode: Intermittent  Pulse Oximeter Device Location: Left, Finger  O2 Device: None (Room air)  O2 Flow Rate (L/min): 3 L/min    Estimated body mass index is 31.32 kg/m² as calculated from the following:    Height as of this encounter: 5' 7\" (1.702 m). Weight as of this encounter: 200 lb (90.7 kg). No intake or output data in the 24 hours ending 11/02/22 1324      Physical Exam:    General:    Well nourished. No overt distress, elderly, pleasant   CV:   RRR. No m/r/g. No JVD, trace edema   Lungs:   CTAB. No wheezing, rhonchi, or rales. Respirations even, unlabored anterior   Abdomen:   Soft, nontender, nondistended. Extremities: Warm and dry. No cyanosis or clubbing. Skin:     No rashes. Normal coloration  Neuro:  grossly intact. Psych:  Normal mood and affect. Signed:  Arlene Blount MD    Part of this note may have been written by using a voice dictation software. The note has been proof read but may still contain some grammatical/other typographical errors.

## 2022-11-10 NOTE — PROGRESS NOTES
Physician Progress Note      Brandon Tovar  CSN #:                  [id number]  :                       1955  ADMIT DATE:       10/28/2022 10:59 PM  [address] DATE:        2022 1:44 PM  RESPONDING  PROVIDER #:        Eleazar Bro MD          QUERY TEXT:    Patient admitted with PNA. Noted documentation of sepsis in H&P presenting   complaint only. In order to support the diagnosis of sepsis, please include   additional clinical indicators in your documentation. Or please document if   the diagnosis of sepsis has been ruled out after further study    The medical record reflects the following:  Risk Factors: PNA, JOSE  PMH of GBS bacteremia 10,2022 completed amoxil 10-20-22, debility, HTN, DM2,   pAFIB, CVA who is evaluated with weakness/ acute encephalopathy. Found with   left base pneumonia. Clinical Indicators: Presenting Complaint: Altered Mental Status (Was sepsis/   possible uti / bed bound for 3 days not norm)  Treatment: IV abx, UCx, UA  Options provided:  -- Sepsis present as evidenced by, Please document evidence. -- Sepsis was ruled out after study  -- Other - I will add my own diagnosis  -- Disagree - Not applicable / Not valid  -- Disagree - Clinically unable to determine / Unknown  -- Refer to Clinical Documentation Reviewer    PROVIDER RESPONSE TEXT:    Sepsis was ruled out after study.     Query created by: Jose Paz on 2022 8:15 AM      Electronically signed by:  Eleazar Bro MD 11/10/2022 6:56 PM

## 2022-12-08 ENCOUNTER — TELEPHONE (OUTPATIENT)
Dept: ORTHOPEDIC SURGERY | Age: 67
End: 2022-12-08

## 2022-12-08 NOTE — TELEPHONE ENCOUNTER
Patient had a SCS put in by Dr Mabel Garcia in Aug. [address] Dr Simona Warner called wanting her be seen asap I made her an apt with [facility] 12/20 but don't think she can wait that long?

## 2022-12-14 ENCOUNTER — OFFICE VISIT (OUTPATIENT)
Dept: ORTHOPEDIC SURGERY | Age: 67
End: 2022-12-14
Payer: MEDICARE

## 2022-12-14 DIAGNOSIS — G89.4 CHRONIC PAIN SYNDROME: ICD-10-CM

## 2022-12-14 DIAGNOSIS — T85.698A: Primary | ICD-10-CM

## 2022-12-14 PROCEDURE — G8428 CUR MEDS NOT DOCUMENT: HCPCS | Performed by: NURSE PRACTITIONER

## 2022-12-14 PROCEDURE — G8400 PT W/DXA NO RESULTS DOC: HCPCS | Performed by: NURSE PRACTITIONER

## 2022-12-14 PROCEDURE — 1123F ACP DISCUSS/DSCN MKR DOCD: CPT | Performed by: NURSE PRACTITIONER

## 2022-12-14 PROCEDURE — 3017F COLORECTAL CA SCREEN DOC REV: CPT | Performed by: NURSE PRACTITIONER

## 2022-12-14 PROCEDURE — 1036F TOBACCO NON-USER: CPT | Performed by: NURSE PRACTITIONER

## 2022-12-14 PROCEDURE — G8417 CALC BMI ABV UP PARAM F/U: HCPCS | Performed by: NURSE PRACTITIONER

## 2022-12-14 PROCEDURE — 1090F PRES/ABSN URINE INCON ASSESS: CPT | Performed by: NURSE PRACTITIONER

## 2022-12-14 PROCEDURE — G8484 FLU IMMUNIZE NO ADMIN: HCPCS | Performed by: NURSE PRACTITIONER

## 2022-12-14 PROCEDURE — 99213 OFFICE O/P EST LOW 20 MIN: CPT | Performed by: NURSE PRACTITIONER

## 2022-12-14 RX ORDER — OXYCODONE HYDROCHLORIDE 15 MG/1
TABLET ORAL
COMMUNITY
Start: 2022-12-13

## 2022-12-14 NOTE — PROGRESS NOTES
Name: Lyle March  YOB: 1955  Gender: female  MRN: [id number]    CC: \"all over pain\"     HPI: This is a 69y.o. year old female who had previous surgery with Dr. Anoop Whitaker. She had a spinal cord stimulator placed back in June/July 2022. She is currently in pain management. She is in taking oxycodone and was recently increased to 15 mg 4 times a day. She states that this is not effective. She states that her lower back is painful it is painful area where the spinal cord stimulator is. It is burning at the battery site. She basically is sedentary and inability to really stand. This is all due to pain complaints. She has pain in the lower back radiates down the hips and into the mid thighs. She wants to discuss her options today. Patient is requesting the implant be explanted. Reviewed Dr. Shantel Flynn preoperative consultation:\"She has low back and left leg pain secondary to an L5-S1 spondylolisthesis with spinal stenosis. She also has a large scoliosis above and what appears  to be notable osteopenia of the spine therefore I thought that surgical treatment would have a high risk of subsequent problems and the patient did not want a large surgery. We sent her for a spinal cord stimulator trial which she had 1 to 2 months ago  and she states that she got 80+ percent improvement of her pain\"    States that she never obtained relief with the stimulator. She finds it it is more painful. Patient has had quite a few hospitalizations for sepsis and also metabolic encephalopathy. She currently denies any fevers right now. Thus far, the patient has tried muscle relaxers, oral steroids, gabapentin or lyrica, opiod pain medicine, spine injections , surgery, and pain management.     Current pain level: 10  Activities limited by pain: Everything          AMB PAIN ASSESSMENT 12/14/2022   Location of Pain Back   Severity of Pain 9   Frequency of Pain Constant   Limiting Behavior Yes   Result of Injury No   Work-Related Injury No   Are there other pain locations you wish to document? No            Allergies   Allergen Reactions    Latex Rash     itching    Benzodiazepines Other (See Comments)     Dr. Leonardo Tyler didn't wont medication combined with other medications pt was taking    Diltiazem Other (See Comments)     Other reaction(s): Headache-Intolerance    Nsaids Other (See Comments) and Swelling     Other reaction(s): Other- (not listed) - Allergy  Elevated Blood Pressure; leg edema  Elevated Blood Pressure; leg edema      Amlodipine Other (See Comments)     Other reaction(s): Headache-Intolerance       Current Outpatient Medications:     magnesium oxide (MAG-OX) 400 (240 Mg) MG tablet, Take 1 tablet by mouth 2 times daily, Disp: 60 tablet, Rfl: 0    loratadine (CLARITIN) 10 MG tablet, Take 1 tablet by mouth daily Strength: 10 mg; Form: tablet; SIG: take 1 tab(s) orally once a day, Disp: 30 tablet, Rfl: 0    hydrALAZINE (APRESOLINE) 25 MG tablet, Take 1 tablet by mouth every 8 hours, Disp: 90 tablet, Rfl: 0    carvedilol (COREG) 6.25 MG tablet, Take 1 tablet by mouth 2 times daily, Disp: 60 tablet, Rfl: 0    nystatin (MYCOSTATIN) 250257 UNIT/GM powder, Apply topically 4 times daily. , Disp: 1 each, Rfl: 0    multivitamin-iron-minerals-folic acid (CENTRUM) chewable tablet, Take 1 tablet by mouth daily, Disp: 30 tablet, Rfl: 3    naloxone (NARCAN) 4 MG/0.1ML LIQD nasal spray, 1 spray by Nasal route as needed for Opioid Reversal, Disp: 1 each, Rfl: 0    insulin lispro (HUMALOG) 100 UNIT/ML SOLN injection vial, Inject 0-8 Units into the skin 3 times daily (with meals) Medium Dose Corrective Algorithm Glucose: Dose:   No Insulin 200-249 2 Units 250-299 4 Units 300-349 6 Units Over 349 8 Units and notify physician, Disp: 1 each, Rfl: 0    traZODone (DESYREL) 50 MG tablet, Take 1 tablet by mouth nightly, Disp: 3 tablet, Rfl: 0    tiZANidine (ZANAFLEX) 4 MG tablet, Take 1 tablet by mouth every 6 hours as needed (Back spasms), Disp: 12 tablet, Rfl: 0    pitavastatin (LIVALO) 2 MG TABS tablet, Take 2 mg by mouth daily, Disp: , Rfl:     gabapentin (NEURONTIN) 300 MG capsule, Take 300 mg by mouth 3 times daily. , Disp: , Rfl:     DULoxetine (CYMBALTA) 60 MG extended release capsule, Take 60 mg by mouth daily, Disp: , Rfl:     celecoxib (CELEBREX) 200 MG capsule, Take 200 mg by mouth 2 times daily, Disp: , Rfl:     estradiol (ESTRACE) 0.5 MG tablet, Take 0.5 mg by mouth daily , Disp: , Rfl:     ezetimibe (ZETIA) 10 MG tablet, Take 10 mg by mouth daily, Disp: , Rfl:     levothyroxine (SYNTHROID) 125 MCG tablet, Take by mouth every morning (before breakfast), Disp: , Rfl:     omeprazole (PRILOSEC) 20 MG delayed release capsule, Take 20 mg by mouth 2 times daily, Disp: , Rfl:     topiramate (TOPAMAX) 100 MG tablet, Take by mouth 2 times daily, Disp: , Rfl:     oxyCODONE (OXY-IR) 15 MG immediate release tablet, , Disp: , Rfl:     DULoxetine (CYMBALTA) 30 MG extended release capsule, Take 30 mg by mouth at bedtime  (Patient not taking: Reported on 12/14/2022), Disp: , Rfl:   Past Medical History:   Diagnosis Date    Arrhythmia     Arthritis     Atrial septal defect 10/26/2016    Small PFO. Followed by Dr. Alfred Flores    Autoimmune disease [facility])     fibromyalgia    Chronic pain     fibromyalgia; chronic back pain     Diarrhea 12/15/2010    Edema 10/26/2016    Fibromyalgia 12/11/2010    GERD (gastroesophageal reflux disease)     HTN (hypertension) 12/11/2010    Hypercapnic acidosis 12/11/2010    Hyperglycemia 12/11/2010    Major depressive disorder, recurrent ([facility] [address] 03/13/2017    Obesity, Class III, BMI 40-49.9 (morbid obesity) ([facility] [address]     also type II diabetes and sleep apnea    Opioid dependence ([facility] [address] 12/11/2010    Palpitations 10/26/2016    Wong Cordova returns for followup. We did not detect any arrhythmias on her event recorder. .  She takes her blood pressure with a wrist cuff and occasionally notes high pressures and heart rates in the 190. Reviewing her old records she did have an EP study 2000 this showed easily inducible atrial flutter was apparently on Rythmol for a period of time. She now relates that she was hospitalized after     Paroxysmal atrial fibrillation ([facility] [address] 10/26/2016    Preop cardiovascular exam 01/23/2017    Primary hypothyroidism 12/13/2010    Prolonged emergence from general anesthesia     slow to wake up with hysterectomy    Psychiatric disorder     depression, anxiety    Respiratory failure ([address] 12/11/2010    Stroke ([facility] [address] 2010    was told had probable tia x2    Thyroid disease     hypo    TIA (transient ischemic attack) 10/26/2016    Type 2 diabetes mellitus without complication ([address] 71/78/6919    insulin reliant; AVG -130; s.s. of hypoglycemia @ 70s; last A1C 6.8 on 06/16/2022    Typical atrial flutter ([facility] [address] 01/23/2017    Unspecified adverse effect of anesthesia     pt had difficulty waking up once per pt    Unspecified sleep apnea     uses 3 Liters oxygen nightly    Weakness 12/13/2010     Tobacco:  reports that she has never smoked. She has never used smokeless tobacco.  Alcohol:   Social History     Substance and Sexual Activity   Alcohol Use No      Physical Exam: Is in wheelchair. She is slumped over with poor posture. She is tender to the touch in any aspect that you try to examine her. She is very weak to stand on her own. Straight leg raise is positive bilaterally. Lower extremity strength is intact. Incisions  are without redness or any drainage. They are well-healed. Radiographic Studies:   Refused any imaging today. Reviewed patient's images that she is had over the past 6 months. She did have a CT of the abdomen and pelvis in October that revealed a small superficial fluid collection around the thoracic lead. However repeat CT scan of the neck and chest revealed resolution of this fluid. No acute process in her neck either. Has arthritis of both hips.   Cervical spondylosis on the CT scans.      Assessment/Plan:        ICD-10-CM    1. Other mechanical complication of other internal device, initial encounter  T85.698A [facility]      2. Chronic pain syndrome  G89.4 [facility]           Long discussion with patient and her . I told her that unfortunately she is not a surgical candidate unless she wanted to go to a tertiary center like [place] to look at surgery. She would need an extensive fusion especially with her scoliosis. I really do not think that she would do well postoperatively. I told her right now unfortunately Dr. Ally Paul is out and Dr. Maged May is getting ready to leave the country and does not have any recent surgical time upcoming. Patient is requesting a referral to neurosurgery. I told her I will be glad to do this for her. She shows no signs of active infection today. I suspect this is all related to her chronic pain syndrome. I have referred her back to pain management and will refer her accordingly to Dr. Mack Silver. - Referral to spine surgeon for surgical consultation. No orders of the defined types were placed in this encounter. Orders Placed This Encounter   Procedures    [facility] OF THE [facility]        3 This is stable chronic illness/condition        HECTOR Logan - CNP  12/21/22      Elements of this note were created using speech recognition software. As such, errors of speech recognition may be present.

## 2022-12-29 ENCOUNTER — TELEPHONE (OUTPATIENT)
Dept: ORTHOPEDIC SURGERY | Age: 67
End: 2022-12-29

## 2022-12-29 NOTE — TELEPHONE ENCOUNTER
Spoke with  and informed them that I will send referral to [place] to review and decide if hey will do removal or not.

## 2022-12-29 NOTE — TELEPHONE ENCOUNTER
called and  asked  is  there  anyone  to  send  a referral  to  other  than dr Tim Garrison for  removal of   SCS? Pt cannot  get in there until  February and has been in the  bed for  3 weeks  with severe pain.   Please call her    Joanne Bosworth

## 2023-01-13 ENCOUNTER — OFFICE VISIT (OUTPATIENT)
Dept: NEUROSURGERY | Age: 68
End: 2023-01-13
Payer: MEDICARE

## 2023-01-13 ENCOUNTER — PREP FOR PROCEDURE (OUTPATIENT)
Dept: NEUROSURGERY | Age: 68
End: 2023-01-13

## 2023-01-13 VITALS
HEIGHT: 67 IN | OXYGEN SATURATION: 95 % | HEART RATE: 68 BPM | BODY MASS INDEX: 28.41 KG/M2 | SYSTOLIC BLOOD PRESSURE: 81 MMHG | WEIGHT: 181 LBS | TEMPERATURE: 97 F | DIASTOLIC BLOOD PRESSURE: 54 MMHG

## 2023-01-13 DIAGNOSIS — G89.4 CHRONIC PAIN SYNDROME: Primary | ICD-10-CM

## 2023-01-13 PROCEDURE — 3078F DIAST BP <80 MM HG: CPT | Performed by: NEUROLOGICAL SURGERY

## 2023-01-13 PROCEDURE — 1036F TOBACCO NON-USER: CPT | Performed by: NEUROLOGICAL SURGERY

## 2023-01-13 PROCEDURE — 1090F PRES/ABSN URINE INCON ASSESS: CPT | Performed by: NEUROLOGICAL SURGERY

## 2023-01-13 PROCEDURE — 3074F SYST BP LT 130 MM HG: CPT | Performed by: NEUROLOGICAL SURGERY

## 2023-01-13 PROCEDURE — 99204 OFFICE O/P NEW MOD 45 MIN: CPT | Performed by: NEUROLOGICAL SURGERY

## 2023-01-13 PROCEDURE — G8427 DOCREV CUR MEDS BY ELIG CLIN: HCPCS | Performed by: NEUROLOGICAL SURGERY

## 2023-01-13 PROCEDURE — G8484 FLU IMMUNIZE NO ADMIN: HCPCS | Performed by: NEUROLOGICAL SURGERY

## 2023-01-13 PROCEDURE — G8417 CALC BMI ABV UP PARAM F/U: HCPCS | Performed by: NEUROLOGICAL SURGERY

## 2023-01-13 PROCEDURE — 1123F ACP DISCUSS/DSCN MKR DOCD: CPT | Performed by: NEUROLOGICAL SURGERY

## 2023-01-13 PROCEDURE — G8400 PT W/DXA NO RESULTS DOC: HCPCS | Performed by: NEUROLOGICAL SURGERY

## 2023-01-13 PROCEDURE — 3017F COLORECTAL CA SCREEN DOC REV: CPT | Performed by: NEUROLOGICAL SURGERY

## 2023-01-13 RX ORDER — SODIUM CHLORIDE 9 MG/ML
INJECTION, SOLUTION INTRAVENOUS PRN
OUTPATIENT
Start: 2023-01-13

## 2023-01-13 RX ORDER — SODIUM CHLORIDE 0.9 % (FLUSH) 0.9 %
5-40 SYRINGE (ML) INJECTION PRN
OUTPATIENT
Start: 2023-01-13

## 2023-01-13 RX ORDER — SODIUM CHLORIDE 0.9 % (FLUSH) 0.9 %
5-40 SYRINGE (ML) INJECTION EVERY 12 HOURS SCHEDULED
OUTPATIENT
Start: 2023-01-13

## 2023-01-13 ASSESSMENT — PATIENT HEALTH QUESTIONNAIRE - PHQ9
SUM OF ALL RESPONSES TO PHQ QUESTIONS 1-9: 0
2. FEELING DOWN, DEPRESSED OR HOPELESS: 0
1. LITTLE INTEREST OR PLEASURE IN DOING THINGS: 0
SUM OF ALL RESPONSES TO PHQ9 QUESTIONS 1 & 2: 0

## 2023-01-13 NOTE — PROGRESS NOTES
History of Present Illness    Patient Words: 69 y.o. This patient is a 69 y.o. female who presents today for evaluation of chronic pain syndrome status post final cord stimulator placement last year by another surgeon. Patient states that her trial was very successful however implantation permanently has not been helpful for her in fact her symptoms have worsened. She describes pain in her back and legs and despite reprogramming attempts the stimulator is not providing her the relief she is desiring therefore she desires to have it removed. She did have an episode of sepsis but this was not related to the spinal cord stimulator. She was hospitalized for pneumonia on several occasions. Past Medical History:   Diagnosis Date    Arrhythmia     Arthritis     Atrial septal defect 10/26/2016    Small PFO. Followed by Dr. Joanne Guillaume    Autoimmune disease [facility])     fibromyalgia    Chronic pain     fibromyalgia; chronic back pain     Diarrhea 12/15/2010    Edema 10/26/2016    Fibromyalgia 12/11/2010    GERD (gastroesophageal reflux disease)     HTN (hypertension) 12/11/2010    Hypercapnic acidosis 12/11/2010    Hyperglycemia 12/11/2010    Major depressive disorder, recurrent ([address] 03/13/2017    Obesity, Class III, BMI 40-49.9 (morbid obesity) ([address]     also type II diabetes and sleep apnea    Opioid dependence ([address] 12/11/2010    Palpitations 10/26/2016    Pretty Bush returns for followup. We did not detect any arrhythmias on her event recorder. .  She takes her blood pressure with a wrist cuff and occasionally notes high pressures and heart rates in the 190. Reviewing her old records she did have an EP study 2000 this showed easily inducible atrial flutter was apparently on Rythmol for a period of time.   She now relates that she was hospitalized after     Paroxysmal atrial fibrillation ([address] 10/26/2016    Preop cardiovascular exam 01/23/2017    Primary hypothyroidism 12/13/2010    Prolonged emergence from general anesthesia     slow to wake up with hysterectomy    Psychiatric disorder     depression, anxiety    [facility] 12/11/2010    Stroke ([facility] 2010    was told had probable tia x2    Thyroid disease     hypo    TIA (transient ischemic attack) 10/26/2016    Type 2 diabetes mellitus without complication ([facility] 37/18/2684    insulin reliant; AVG -130; s.s. of hypoglycemia @ 70s; last A1C 6.8 on 06/16/2022    Typical atrial flutter ([facility] 01/23/2017    Unspecified adverse effect of anesthesia     pt had difficulty waking up once per pt    Unspecified sleep apnea     uses 3 Liters oxygen nightly    Weakness 12/13/2010        Allergies   Allergen Reactions    Latex Rash     itching    Benzodiazepines Other (See Comments)     Dr. Elzie Heimlich didn't wont medication combined with other medications pt was taking    Diltiazem Other (See Comments)     Other reaction(s): Headache-Intolerance    Nsaids Swelling and Other (See Comments)       Elevated Blood Pressure      Amlodipine Other (See Comments)     Other reaction(s): Headache-Intolerance        Family History   Problem Relation Age of Onset    Heart Attack Father     Heart Disease Father     Post-op Nausea/Vomiting Mother     Cancer Mother         skin        Social History     Socioeconomic History    Marital status:      Spouse name: Not on file    Number of children: Not on file    Years of education: Not on file    Highest education level: Not on file   Occupational History    Not on file   Tobacco Use    Smoking status: Never    Smokeless tobacco: Never   Vaping Use    Vaping Use: Never used   Substance and Sexual Activity    Alcohol use: No    Drug use: No    Sexual activity: Not on file   Other Topics Concern    Not on file   Social History Narrative    She is a never smoker, lives with her  and has two children that are accompanying her to the hospital.  She does not use illicit drugs apparently.      Social Determinants of Health     Financial Resource Strain: Not on file   Food Insecurity: Not on file   Transportation Needs: Not on file   Physical Activity: Not on file   Stress: Not on file   Social Connections: Not on file   Intimate Partner Violence: Not on file   Housing Stability: Not on file       Current Outpatient Medications   Medication Sig Dispense Refill    oxyCODONE (OXY-IR) 15 MG immediate release tablet       magnesium oxide (MAG-OX) 400 (240 Mg) MG tablet Take 1 tablet by mouth 2 times daily 60 tablet 0    loratadine (CLARITIN) 10 MG tablet Take 1 tablet by mouth daily Strength: 10 mg; Form: tablet; SIG: take 1 tab(s) orally once a day 30 tablet 0    hydrALAZINE (APRESOLINE) 25 MG tablet Take 1 tablet by mouth every 8 hours 90 tablet 0    carvedilol (COREG) 6.25 MG tablet Take 1 tablet by mouth 2 times daily 60 tablet 0    nystatin (MYCOSTATIN) 868224 UNIT/GM powder Apply topically 4 times daily. 1 each 0    multivitamin-iron-minerals-folic acid (CENTRUM) chewable tablet Take 1 tablet by mouth daily 30 tablet 3    naloxone (NARCAN) 4 MG/0.1ML LIQD nasal spray 1 spray by Nasal route as needed for Opioid Reversal 1 each 0    insulin lispro (HUMALOG) 100 UNIT/ML SOLN injection vial Inject 0-8 Units into the skin 3 times daily (with meals) Medium Dose Corrective Algorithm  Glucose: Dose:    No Insulin  200-249 2 Units  250-299 4 Units  300-349 6 Units  Over 349 8 Units and notify physician 1 each 0    traZODone (DESYREL) 50 MG tablet Take 1 tablet by mouth nightly 3 tablet 0    tiZANidine (ZANAFLEX) 4 MG tablet Take 1 tablet by mouth every 6 hours as needed (Back spasms) 12 tablet 0    pitavastatin (LIVALO) 2 MG TABS tablet Take 2 mg by mouth daily      gabapentin (NEURONTIN) 300 MG capsule Take 300 mg by mouth 3 times daily.       DULoxetine (CYMBALTA) 60 MG extended release capsule Take 60 mg by mouth daily      celecoxib (CELEBREX) 200 MG capsule Take 200 mg by mouth 2 times daily      DULoxetine (CYMBALTA) 30 MG extended release capsule Take 30 mg by mouth at bedtime      estradiol (ESTRACE) 0.5 MG tablet Take 0.5 mg by mouth daily       ezetimibe (ZETIA) 10 MG tablet Take 10 mg by mouth daily      levothyroxine (SYNTHROID) 125 MCG tablet Take by mouth every morning (before breakfast)      omeprazole (PRILOSEC) 20 MG delayed release capsule Take 20 mg by mouth 2 times daily      topiramate (TOPAMAX) 100 MG tablet Take by mouth 2 times daily       No current facility-administered medications for this visit. Patient Active Problem List   Diagnosis    Opioid dependence ([facility] [address]    Ulcer of right foot ([facility] [address]    HTN (hypertension)    Typical atrial flutter (HCC)    Other chest pain    Hypercapnic acidosis    Pure hypercholesterolemia    Fibromyalgia    Diarrhea    Murmur    Respiratory failure (HCC)    Weakness    Major depressive disorder, recurrent (HCC)    Generalized anxiety disorder    Hyperglycemia    Agatston coronary artery calcium score between 100 and 199    HEIDY (obstructive sleep apnea)    Edema    Paroxysmal atrial fibrillation ([facility] [address]    Diabetes mellitus type 2, controlled ([facility] [address]    Primary hypothyroidism    Obesity    Chronic low back pain    Chronic pain syndrome    Cellulitis of right lower extremity    Bacteremia due to Gram-positive bacteria    S/P insertion of spinal cord stimulator    Pneumonia of left lower lobe due to infectious organism    Pneumonia due to organism        Review of Systems: A complete ROS was done and as stated in the HPI or otherwise negative. BP (!) 81/54   Pulse 68   Temp 97 °F (36.1 °C) (Temporal)   Ht 5' 7\" (1.702 m)   Wt 181 lb (82.1 kg)   SpO2 95%   BMI 28.35 kg/m²        Physical Exam  The physical exam findings are as follows:Physical Exam:   General:  Alert, cooperative, no distress, appears stated age. Eyes:  Conjunctivae/corneas clear. PERRL, EOMs intact. Fundi benign   Ears:  Normal TMs and external ear canals both ears. Nose: Nares normal. Septum midline.  Mucosa normal. No drainage or sinus tenderness. Mouth/Throat: Lips, mucosa, and tongue normal. Teeth and gums normal.   Neck: Supple, symmetrical, trachea midline, no adenopathy, thyroid: no enlargment/tenderness/nodules, no carotid bruit and no JVD. Back:   Symmetric, no curvature. ROM normal. No CVA tenderness. Lungs:   Clear to auscultation bilaterally. Heart:  Regular rate and rhythm, S1, S2 normal, no murmur, click, rub or gallop. Abdomen:   Soft, non-tender. Bowel sounds normal. No masses,  No organomegaly. Extremities: Extremities normal, atraumatic, no cyanosis or edema. Pulses: 2+ and symmetric all extremities. Skin: Skin color, texture, turgor normal. No rashes or lesions   Lymph nodes: Cervical, supraclavicular, and axillary nodes normal.   Appearance: The patient is well developed, well nourished, provides a coherent history and is in no acute distress. Cranial Nerves:   Intact visual fields. Fundi are benign. SARAHI, EOM's full, no nystagmus, no ptosis. Facial sensation is normal. Corneal reflexes are intact. Facial movement is symmetric. Hearing is normal bilaterally. Palate is midline with normal sternocleidomastoid and trapezius muscles are normal. Tongue is midline. Motor:  5/5 strength in upper and lower proximal and distal muscles. Normal bulk and tone. No fasciculations. Reflexes:   Deep tendon reflexes 2+/4 and symmetrical.   Sensory:   Normal to touch, pinprick and vibration. Gait:  Not observed   Tremor:   No tremor noted. Cerebellar:  No cerebellar signs present. Well-healed thoracic and lumbar incisions. Assessment & Plan      ICD-10-CM    1. Chronic pain syndrome  G89.4          Spinal cord stimulator removal.  Total.    Consent Spinal Cord Stimulator:  The relative risks of complication include but are not limited to infection, bleeding, spinal fluid leak, major vascular surgery, increased pain, weakness, numbness, paralysis, incontinence, impotence, heart attack, stroke and death were discussed with the patient and the family members present. They have been provided the opportunity to ask any questions regarding the surgical procedures.  The patient and family members present expressed understanding and agree to proceed with surgery   Marlon Sandoval MD

## 2023-01-16 ENCOUNTER — TELEPHONE (OUTPATIENT)
Dept: NEUROSURGERY | Age: 68
End: 2023-01-16

## 2023-01-16 NOTE — TELEPHONE ENCOUNTER
Per PAT -last A1C was completed on 12- -result 8.4  Will ask DR. Leopold Sensor if patient needs to repeat.   1-20-23-- SCS removal

## 2023-01-17 ENCOUNTER — TELEPHONE (OUTPATIENT)
Dept: NEUROSURGERY | Age: 68
End: 2023-01-17

## 2023-01-17 NOTE — TELEPHONE ENCOUNTER
Patient and  understand surgical procedure of total removal of SCS due to pain and not effective and/or not  working. They would like the explant sent back to the  to check  if the system is defective and why the system might of malfunctioned. Will inform DR. Miriam Mckeon and [place], [place]. Also advised the patient to reach out to pre op and notify them of the above the day of surgery. Will ask DR. Justice Thomas if the SCS rep needs to be present during surgery.   Scheduled  1-20-23

## 2023-01-17 NOTE — TELEPHONE ENCOUNTER
Patient's spouse has questions about the SCS surgery. Patient is scheduled for surgery on Friday with Dr. Whitney Larson.     [phone]

## 2023-01-19 ENCOUNTER — ANESTHESIA EVENT (OUTPATIENT)
Dept: SURGERY | Age: 68
End: 2023-01-19
Payer: MEDICARE

## 2023-01-19 RX ORDER — PROCHLORPERAZINE EDISYLATE 5 MG/ML
5 INJECTION INTRAMUSCULAR; INTRAVENOUS
Status: CANCELLED | OUTPATIENT
Start: 2023-01-19 | End: 2023-01-20

## 2023-01-19 RX ORDER — ONDANSETRON 2 MG/ML
4 INJECTION INTRAMUSCULAR; INTRAVENOUS
Status: CANCELLED | OUTPATIENT
Start: 2023-01-19 | End: 2023-01-20

## 2023-01-19 RX ORDER — OXYCODONE HYDROCHLORIDE 5 MG/1
5 TABLET ORAL
Status: CANCELLED | OUTPATIENT
Start: 2023-01-19 | End: 2023-01-20

## 2023-01-19 RX ORDER — HYDROMORPHONE HCL 110MG/55ML
0.25 PATIENT CONTROLLED ANALGESIA SYRINGE INTRAVENOUS EVERY 5 MIN PRN
Status: CANCELLED | OUTPATIENT
Start: 2023-01-19

## 2023-01-19 RX ORDER — HYDROMORPHONE HCL 110MG/55ML
0.5 PATIENT CONTROLLED ANALGESIA SYRINGE INTRAVENOUS EVERY 5 MIN PRN
Status: CANCELLED | OUTPATIENT
Start: 2023-01-19

## 2023-01-19 RX ORDER — HYDRALAZINE HYDROCHLORIDE 20 MG/ML
10 INJECTION INTRAMUSCULAR; INTRAVENOUS
Status: CANCELLED | OUTPATIENT
Start: 2023-01-19

## 2023-01-19 RX ORDER — LABETALOL HYDROCHLORIDE 5 MG/ML
10 INJECTION, SOLUTION INTRAVENOUS
Status: CANCELLED | OUTPATIENT
Start: 2023-01-19

## 2023-01-20 ENCOUNTER — ANESTHESIA (OUTPATIENT)
Dept: SURGERY | Age: 68
End: 2023-01-20
Payer: MEDICARE

## 2023-01-20 ENCOUNTER — HOSPITAL ENCOUNTER (OUTPATIENT)
Age: 68
Setting detail: OUTPATIENT SURGERY
Discharge: HOME | End: 2023-01-20
Attending: NEUROLOGICAL SURGERY | Admitting: NEUROLOGICAL SURGERY
Payer: MEDICARE

## 2023-01-20 LAB
GLUCOSE BLD STRIP.AUTO-MCNC: 170 MG/DL (ref 65–100)
SERVICE CMNT-IMP: ABNORMAL

## 2023-01-20 PROCEDURE — 82962 GLUCOSE BLOOD TEST: CPT

## 2023-01-20 PROCEDURE — 6370000000 HC RX 637 (ALT 250 FOR IP): Performed by: NEUROLOGICAL SURGERY

## 2023-01-20 PROCEDURE — 2580000003 HC RX 258: Performed by: ANESTHESIOLOGY

## 2023-01-20 RX ORDER — ACETAMINOPHEN 500 MG
1000 TABLET ORAL ONCE
Status: DISCONTINUED | OUTPATIENT
Start: 2023-01-20 | End: 2023-01-28 | Stop reason: HOSPADM

## 2023-01-20 RX ORDER — SODIUM CHLORIDE 0.9 % (FLUSH) 0.9 %
5-40 SYRINGE (ML) INJECTION EVERY 12 HOURS SCHEDULED
Status: DISCONTINUED | OUTPATIENT
Start: 2023-01-20 | End: 2023-01-28 | Stop reason: HOSPADM

## 2023-01-20 RX ORDER — SODIUM CHLORIDE, SODIUM LACTATE, POTASSIUM CHLORIDE, CALCIUM CHLORIDE 600; 310; 30; 20 MG/100ML; MG/100ML; MG/100ML; MG/100ML
INJECTION, SOLUTION INTRAVENOUS CONTINUOUS
Status: DISCONTINUED | OUTPATIENT
Start: 2023-01-20 | End: 2023-01-28 | Stop reason: HOSPADM

## 2023-01-20 RX ORDER — SODIUM CHLORIDE 9 MG/ML
INJECTION, SOLUTION INTRAVENOUS PRN
Status: DISCONTINUED | OUTPATIENT
Start: 2023-01-20 | End: 2023-01-28 | Stop reason: HOSPADM

## 2023-01-20 RX ORDER — FENTANYL CITRATE 50 UG/ML
100 INJECTION, SOLUTION INTRAMUSCULAR; INTRAVENOUS
Status: DISCONTINUED | OUTPATIENT
Start: 2023-01-20 | End: 2023-01-21 | Stop reason: HOSPADM

## 2023-01-20 RX ORDER — LIDOCAINE HYDROCHLORIDE 10 MG/ML
1 INJECTION, SOLUTION INFILTRATION; PERINEURAL
Status: DISCONTINUED | OUTPATIENT
Start: 2023-01-20 | End: 2023-01-21 | Stop reason: HOSPADM

## 2023-01-20 RX ORDER — SODIUM CHLORIDE 0.9 % (FLUSH) 0.9 %
5-40 SYRINGE (ML) INJECTION PRN
Status: DISCONTINUED | OUTPATIENT
Start: 2023-01-20 | End: 2023-01-28 | Stop reason: HOSPADM

## 2023-01-20 RX ADMIN — SODIUM CHLORIDE, POTASSIUM CHLORIDE, SODIUM LACTATE AND CALCIUM CHLORIDE: 600; 310; 30; 20 INJECTION, SOLUTION INTRAVENOUS at 10:38

## 2023-01-20 RX ADMIN — Medication 3 AMPULE: at 10:38

## 2023-01-20 NOTE — PROGRESS NOTES
Neurosurgery. The patient presented to the hospital today for total removal of her spinal cord stimulator. She felt that the stimulator was not functioning properly. Representatives from [employer] was present and tested the stimulator both pulse generator and electrode paddle and everything was functioning properly. The patient states is that she had a bout of sepsis 3 months ago her pain is increased. She has not had any reprogramming nor has she been back to see the pain management specialist that did the trial.  She is being managed by a medical pain management group that is non interventional.    Dr. Micah Torres did the trial and he was contacted this morning. He will be glad to see the patient in the office for reprogramming and attempted correction/improvement of her pain. If this fails then I feel that it is reasonable to proceed with explant of the spinal cord stimulator. The patient agrees. We would like to try to keep the device since it is functioning properly and perhaps can be reprogrammed to her satisfaction. Therefore her surgery was canceled for today.     Antonio Sandy MD

## 2023-01-20 NOTE — ANESTHESIA PRE PROCEDURE
Department of Anesthesiology  Preprocedure Note       Name:  Harvey Birch   Age:  69 y.o.  :  1955                                          MRN:  [id number]         Date:  2023      Surgeon: Randi Aguilar):  Belle Grace MD    Procedure: Procedure(s):  Spinal Cord Stimulator Removal    Medications prior to admission:   Prior to Admission medications    Medication Sig Start Date End Date Taking? Authorizing Provider   aspirin 81 MG chewable tablet Take 81 mg by mouth at bedtime    Historical Provider, MD   oxyCODONE (OXY-IR) 15 MG immediate release tablet Take 15 mg by mouth every 4 hours. 22   Historical Provider, MD   magnesium oxide (MAG-OX) 400 (240 Mg) MG tablet Take 1 tablet by mouth 2 times daily 22   Mercedes Gu MD   loratadine (CLARITIN) 10 MG tablet Take 1 tablet by mouth daily Strength: 10 mg; Form: tablet; SIG: take 1 tab(s) orally once a day 22   Mercedes Gu MD   hydrALAZINE (APRESOLINE) 25 MG tablet Take 1 tablet by mouth every 8 hours  Patient taking differently: Take 25 mg by mouth 4 times daily 22   Mercedes Gu MD   carvedilol (COREG) 6.25 MG tablet Take 1 tablet by mouth 2 times daily  Patient taking differently: Take 12.5 mg by mouth 2 times daily 22   Mercedes Gu MD   nystatin (MYCOSTATIN) 037289 UNIT/GM powder Apply topically 4 times daily.  22   Mercedes Gu MD   multivitamin-iron-minerals-folic acid (CENTRUM) chewable tablet Take 1 tablet by mouth daily 22   Mercedes Gu MD   naloxone [facility]) 4 MG/0.1ML LIQD nasal spray 1 spray by Nasal route as needed for Opioid Reversal  Patient not taking: Reported on 2023   Mercedes Gu MD   insulin lispro (HUMALOG) 100 UNIT/ML SOLN injection vial Inject 0-8 Units into the skin 3 times daily (with meals) Medium Dose Corrective Algorithm  Glucose: Dose:    No Insulin  200-249 2 Units  250-299 4 Units  300-349 6 Units  Over 349 8 Units and notify physician 11/2/22   Mark Pires MD   traZODone (DESYREL) 50 MG tablet Take 1 tablet by mouth nightly 10/11/22   Chloe Bautista DO   tiZANidine (ZANAFLEX) 4 MG tablet Take 1 tablet by mouth every 6 hours as needed (Back spasms)  Patient taking differently: Take 4 mg by mouth every 8 hours as needed (Back spasms) 10/11/22   Ross Overton DO   gabapentin (NEURONTIN) 300 MG capsule Take 300 mg by mouth 3 times daily. Historical Provider, MD   DULoxetine (CYMBALTA) 60 MG extended release capsule Take 60 mg by mouth daily    Historical Provider, MD   celecoxib (CELEBREX) 200 MG capsule Take 200 mg by mouth 2 times daily    Ar Automatic Reconciliation   estradiol (ESTRACE) 0.5 MG tablet Take 0.5 mg by mouth daily  2/7/17   Ar Automatic Reconciliation   ezetimibe (ZETIA) 10 MG tablet Take 10 mg by mouth daily 10/22/21   Ar Automatic Reconciliation   levothyroxine (SYNTHROID) 125 MCG tablet Take by mouth every morning (before breakfast)    Ar Automatic Reconciliation   omeprazole (PRILOSEC) 20 MG delayed release capsule Take 20 mg by mouth 2 times daily    Ar Automatic Reconciliation   topiramate (TOPAMAX) 100 MG tablet Take by mouth 2 times daily    Ar Automatic Reconciliation       Current medications:    No current facility-administered medications for this visit. No current outpatient medications on file.      Facility-Administered Medications Ordered in Other Visits   Medication Dose Route Frequency Provider Last Rate Last Admin    lidocaine 1 % injection 1 mL  1 mL IntraDERmal Once PRN Arabella Dobson DO        acetaminophen (TYLENOL) tablet 1,000 mg  1,000 mg Oral Once Arabella Dobson DO        fentaNYL (SUBLIMAZE) injection 100 mcg  100 mcg IntraVENous Once PRN Arabella Dobson DO        lactated ringers IV soln infusion   IntraVENous Continuous Arabella Dobson  mL/hr at 01/20/23 1038 New Bag at 01/20/23 1038    sodium chloride flush 0.9 % injection 5-40 mL  5-40 mL IntraVENous 2 times per day Keiko Chay Dobson, DO        sodium chloride flush 0.9 % injection 5-40 mL  5-40 mL IntraVENous PRN Keiko Xiong, DO        0.9 % sodium chloride infusion   IntraVENous PRN Keiko Chay Jimmy Xiong, DO        sodium chloride flush 0.9 % injection 5-40 mL  5-40 mL IntraVENous 2 times per day Jae Goldman MD        sodium chloride flush 0.9 % injection 5-40 mL  5-40 mL IntraVENous PRN Jae Goldman MD        0.9 % sodium chloride infusion   IntraVENous PRN Jae Goldman MD        ceFAZolin (ANCEF) 2000 mg in sterile water 20 mL IV syringe  2,000 mg IntraVENous On Call to Mihir Fernandez MD           Allergies:     Allergies   Allergen Reactions    Latex Rash     itching    Benzodiazepines Other (See Comments)     Dr. Gladis Escalante didn't wont medication combined with other medications pt was taking    Diltiazem Other (See Comments)     Other reaction(s): Headache-Intolerance    Nsaids Swelling and Other (See Comments)       Elevated Blood Pressure      Calcium Channel Blockers Other (See Comments)     Severe headaches    Amlodipine Other (See Comments)     Other reaction(s): Headache-Intolerance       Problem List:    Patient Active Problem List   Diagnosis Code    Opioid dependence ([facility] F11.20    Ulcer of right foot ([facility] L97.519    HTN (hypertension) I10    Typical atrial flutter ([address] I48.3    Other chest pain R07.89    Hypercapnic acidosis E87.29    Pure hypercholesterolemia E78.00    Fibromyalgia M79.7    Diarrhea R19.7    Murmur R01.1    Respiratory failure ([address] J96.90    Weakness R53.1    Major depressive disorder, recurrent (HCC) F33.9    Generalized anxiety disorder F41.1    Hyperglycemia R73.9    Agatston coronary artery calcium score between 100 and 199 R93.1    HEIDY (obstructive sleep apnea) G47.33    Edema R60.9    Paroxysmal atrial fibrillation ([address] I48.0    Diabetes mellitus type 2, controlled ([facility] E11.9    Primary hypothyroidism E03.9    Obesity E66.9    Chronic low back pain M54.50, G89.29    Chronic pain syndrome G89.4    Cellulitis of right lower extremity L03.115    Bacteremia due to Gram-positive bacteria R78.81    S/P insertion of spinal cord stimulator Z96.89    Pneumonia of left lower lobe due to infectious organism J18.9    Pneumonia due to organism J18.9       Past Medical History:        Diagnosis Date    Anxiety     Arthritis     Atrial septal defect 10/26/2016    Small PFO.  Followed by Dr. Tito Vicente Chronic pain     fibromyalgia; chronic back pain     Edema 10/26/2016    Fibromyalgia 12/11/2010    GERD (gastroesophageal reflux disease)     controlled with med    H/O gastric bypass 2018    WT LOSS 120 LBS    HTN (hypertension) 12/11/2010    Hypercapnic acidosis 12/11/2010    Hyperglycemia 12/11/2010    Major depressive disorder, recurrent ([address] 03/13/2017    Migraines     Neuropathy     Opioid dependence ([address] 12/11/2010    Paroxysmal atrial fibrillation ([address] 10/26/2016    per pt-- not an current issue-- not on blood thinners or meds for this-- states \" not been a problem in yrs\"-- followed by dr Nancy Dominguez--- [facility] ekg in [facility] 10/2022 [facility]R    Primary hypothyroidism 12/13/2010    Prolonged emergence from general anesthesia     slow to wake up with hysterectomy    Respiratory failure ([address] 12/11/2010    r/t pt unsure of why    TIA (transient ischemic attack) 10/26/2016    Type 2 diabetes mellitus without complication ([address] 34/97/0942    type 2-- sqbs am avg 140-150-- no hypo    Unspecified sleep apnea     not a current issue-- since wt loss 120LBS       Past Surgical History:        Procedure Laterality Date    CARDIAC CATHETERIZATION      COLONOSCOPY N/A 5/29/2018    COLONOSCOPY/ 45 performed [facility] Let[facility] Mar MD at [facility] ENDOSCOPY    COLONOSCOPY N/A 12/5/2017    COLONOSCOPY /   BMI 44 performed by Leti Mar MD at [address] [place] [address] SURGERY  2018    STIMULATOR SURGERY N/A 06/23/2022    T8-9 THORACIC LAMINOTOMY AND PLACEMENT OF SPINAL CORD STIMULATOR LEAD AND BATTERY/ANS performed by Fanta Pierre MD at [facility] MAIN OR    STELLA AND BSO (CERVIX REMOVED)  1999       Social History:    Social History     Tobacco Use    Smoking status: Never    Smokeless tobacco: Never   Substance Use Topics    Alcohol use: No                                Counseling given: Not Answered      Vital Signs (Current): There were no vitals filed for this visit.                                            BP Readings from Last 3 Encounters:   01/13/23 (!) 81/54   11/02/22 (!) 163/75   10/11/22 135/61       NPO Status:                                                                                 BMI:   Wt Readings from Last 3 Encounters:   01/16/23 180 lb (81.6 kg)   01/13/23 181 lb (82.1 kg)   10/28/22 200 lb (90.7 kg)     There is no height or weight on file to calculate BMI.    CBC:   Lab Results   Component Value Date/Time    WBC 9.9 11/02/2022 04:13 AM    RBC 4.14 11/02/2022 04:13 AM    HGB 12.1 11/02/2022 04:13 AM    HCT 38.3 11/02/2022 04:13 AM    MCV 92.5 11/02/2022 04:13 AM    RDW 13.6 11/02/2022 04:13 AM     11/02/2022 04:13 AM       CMP:   Lab Results   Component Value Date/Time     11/02/2022 04:13 AM    K 3.6 11/02/2022 04:13 AM     11/02/2022 04:13 AM    CO2 28 11/02/2022 04:13 AM    BUN 9 11/02/2022 04:13 AM    CREATININE 0.69 11/02/2022 04:13 AM    GFRAA >60 06/16/2022 12:23 PM    LABGLOM >60 11/02/2022 04:13 AM    GLUCOSE 161 11/02/2022 04:13 AM    PROT 6.8 10/30/2022 04:31 AM    CALCIUM 9.2 11/02/2022 04:13 AM    BILITOT 0.2 10/30/2022 04:31 AM    ALKPHOS 65 10/30/2022 04:31 AM    AST 16 10/30/2022 04:31 AM    ALT 14 10/30/2022 04:31 AM       POC Tests:   Recent Labs     01/20/23  1034   POCGLU 170*       Coags: No results found for: PROTIME, INR, APTT    HCG (If Applicable): No results found for: PREGTESTUR, PREGSERUM, HCG, HCGQUANT     ABGs: No results found for: PHART, PO2ART, POQ0FYB, ILX6DYS, BEART, F5GQZPSB     Type & Screen (If Applicable):  No results found for: LABABO, LABRH    Drug/Infectious Status (If Applicable):  No results found for: HIV, HEPCAB    COVID-19 Screening (If Applicable):   Lab Results   Component Value Date/Time    COVID19 Not detected 11/02/2022 09:44 AM           Anesthesia Evaluation  Patient summary reviewed and Nursing notes reviewed no history of anesthetic complications:   Airway: Mallampati: II  TM distance: >3 FB   Neck ROM: full  Mouth opening: > = 3 FB   Dental: normal exam         Pulmonary:normal exam    (+) pneumonia: resolved,  sleep apnea (resolved with weight loss after bariatric surgery):                             Cardiovascular:  Exercise tolerance: poor (<4 METS), Limited by pain  (+) hypertension:, CAD: non-obstructive, dysrhythmias (paf/ hx typical aflutter): atrial flutter and atrial fibrillation, hyperlipidemia        Rhythm: regular  Rate: normal  Echocardiogram reviewed               ROS comment: Echo 10/2022      Left Ventricle: Normal left ventricular systolic function with a visually estimated EF of 55 - 60%. Left ventricle size is normal. Normal wall thickness. Normal wall motion. Abnormal diastolic function.   Mitral Valve: Mild regurgitation.   Left Atrium: Left atrium is moderately dilated.   Technical qualifiers: Color flow Doppler was performed and pulse wave and/or continuous wave Doppler was performed.   Contrast used: Definity. Neuro/Psych:   (+) TIA, psychiatric history: stable with treatment            GI/Hepatic/Renal:   (+) GERD: well controlled,           Endo/Other:    (+) DiabetesType II DM, well controlled, using insulin, hypothyroidism::., .                 Abdominal:             Vascular: negative vascular ROS. Other Findings:             Anesthesia Plan      general     ASA 3       Induction: intravenous.     MIPS: Postoperative opioids intended and Prophylactic antiemetics administered. Anesthetic plan and risks discussed with spouse and patient.                         Kevin Poe DO   1/20/2023

## 2023-04-24 RX ORDER — HYDRALAZINE HYDROCHLORIDE 25 MG/1
25 TABLET, FILM COATED ORAL 4 TIMES DAILY
Qty: 120 TABLET | Refills: 0 | Status: SHIPPED | OUTPATIENT
Start: 2023-04-24

## 2023-04-24 RX ORDER — EZETIMIBE 10 MG/1
10 TABLET ORAL DAILY
Qty: 30 TABLET | Refills: 0 | Status: SHIPPED | OUTPATIENT
Start: 2023-04-24

## 2023-04-24 RX ORDER — LISINOPRIL 40 MG/1
40 TABLET ORAL DAILY
Qty: 30 TABLET | Refills: 0 | Status: SHIPPED | OUTPATIENT
Start: 2023-04-24

## 2023-04-24 NOTE — TELEPHONE ENCOUNTER
Requested Prescriptions     Pending Prescriptions Disp Refills    hydrALAZINE (APRESOLINE) 25 MG tablet 120 tablet 0     Sig: Take 1 tablet by mouth 4 times daily    ezetimibe (ZETIA) 10 MG tablet 30 tablet 0     Sig: Take 1 tablet by mouth daily    lisinopril (PRINIVIL;ZESTRIL) 40 MG tablet 30 tablet 0     Sig: Take 1 tablet by mouth daily

## 2023-05-12 ENCOUNTER — OFFICE VISIT (OUTPATIENT)
Age: 68
End: 2023-05-12
Payer: MEDICARE

## 2023-05-12 VITALS
BODY MASS INDEX: 28.88 KG/M2 | HEART RATE: 60 BPM | HEIGHT: 67 IN | SYSTOLIC BLOOD PRESSURE: 128 MMHG | DIASTOLIC BLOOD PRESSURE: 84 MMHG | WEIGHT: 184 LBS

## 2023-05-12 DIAGNOSIS — I10 PRIMARY HYPERTENSION: Primary | ICD-10-CM

## 2023-05-12 DIAGNOSIS — I48.0 PAROXYSMAL ATRIAL FIBRILLATION (HCC): ICD-10-CM

## 2023-05-12 PROCEDURE — G8417 CALC BMI ABV UP PARAM F/U: HCPCS | Performed by: INTERNAL MEDICINE

## 2023-05-12 PROCEDURE — 1036F TOBACCO NON-USER: CPT | Performed by: INTERNAL MEDICINE

## 2023-05-12 PROCEDURE — 99204 OFFICE O/P NEW MOD 45 MIN: CPT | Performed by: INTERNAL MEDICINE

## 2023-05-12 PROCEDURE — G8428 CUR MEDS NOT DOCUMENT: HCPCS | Performed by: INTERNAL MEDICINE

## 2023-05-12 PROCEDURE — 3074F SYST BP LT 130 MM HG: CPT | Performed by: INTERNAL MEDICINE

## 2023-05-12 PROCEDURE — G8400 PT W/DXA NO RESULTS DOC: HCPCS | Performed by: INTERNAL MEDICINE

## 2023-05-12 PROCEDURE — 1123F ACP DISCUSS/DSCN MKR DOCD: CPT | Performed by: INTERNAL MEDICINE

## 2023-05-12 PROCEDURE — 3017F COLORECTAL CA SCREEN DOC REV: CPT | Performed by: INTERNAL MEDICINE

## 2023-05-12 PROCEDURE — 3079F DIAST BP 80-89 MM HG: CPT | Performed by: INTERNAL MEDICINE

## 2023-05-12 PROCEDURE — 1090F PRES/ABSN URINE INCON ASSESS: CPT | Performed by: INTERNAL MEDICINE

## 2023-05-12 RX ORDER — HYDRALAZINE HYDROCHLORIDE 25 MG/1
25 TABLET, FILM COATED ORAL 4 TIMES DAILY
Qty: 360 TABLET | Refills: 3 | Status: SHIPPED | OUTPATIENT
Start: 2023-05-12

## 2023-05-12 RX ORDER — CARVEDILOL 6.25 MG/1
6.25-12.5 TABLET ORAL 2 TIMES DAILY
Qty: 180 TABLET | Refills: 3 | Status: SHIPPED | OUTPATIENT
Start: 2023-05-12

## 2023-05-12 RX ORDER — EZETIMIBE 10 MG/1
10 TABLET ORAL DAILY
Qty: 90 TABLET | Refills: 3 | Status: SHIPPED | OUTPATIENT
Start: 2023-05-12

## 2023-05-12 RX ORDER — CHLORTHALIDONE 25 MG/1
25 TABLET ORAL DAILY PRN
COMMUNITY
Start: 2023-03-28

## 2023-05-12 RX ORDER — LISINOPRIL 20 MG/1
20 TABLET ORAL 2 TIMES DAILY
Qty: 60 TABLET | Refills: 11 | Status: SHIPPED | OUTPATIENT
Start: 2023-05-12

## 2023-05-12 RX ORDER — TRAZODONE HYDROCHLORIDE 100 MG/1
100 TABLET ORAL NIGHTLY
COMMUNITY
Start: 2023-05-05

## 2023-05-12 NOTE — PROGRESS NOTES
[address] [place], [place]  [address], [address], [address]  [address]  PHONE: [phone]           HISTORY AND PHYSICAL          SUBJECTIVE:   Tmamy Botello is a 77 y.o. female seen for a consultation visit regarding the following:     Chief Complaint   Patient presents with    Atrial Fibrillation    Hypertension            HPI:    No cp or bender. No orthopnea or pnd. No palpitations or syncope. Allergies   Allergen Reactions    Latex Rash     itching    Benzodiazepines Other (See Comments)     Dr. Saravanan Lobo didn't wont medication combined with other medications pt was taking    Diltiazem Other (See Comments)     Other reaction(s): Headache-Intolerance    Nsaids Swelling and Other (See Comments)       Elevated Blood Pressure      Calcium Channel Blockers Other (See Comments)     Severe headaches    Amlodipine Other (See Comments)     Other reaction(s): Headache-Intolerance     Past Medical History:   Diagnosis Date    Anxiety     Arthritis     Atrial septal defect 10/26/2016    Small PFO.  Followed by Dr. Benjamin Ulloa    Chronic pain     fibromyalgia; chronic back pain     Edema 10/26/2016    Fibromyalgia 12/11/2010    GERD (gastroesophageal reflux disease)     controlled with med    H/O gastric bypass 2018    WT LOSS 120 LBS    HTN (hypertension) 12/11/2010    Hypercapnic acidosis 12/11/2010    Hyperglycemia 12/11/2010    Major depressive disorder, recurrent ([address] 03/13/2017    Migraines     Neuropathy     Opioid dependence ([address] 12/11/2010    Paroxysmal atrial fibrillation ([address] 10/26/2016    per pt-- not an current issue-- not on blood thinners or meds for this-- states \" not been a problem in yrs\"-- followed by dr Brendan Luis--- last ekg in [facility] 10/2022 NSR    Primary hypothyroidism 12/13/2010    Prolonged emergence from general anesthesia     slow to wake up with hysterectomy    Respiratory failure ([address] 12/11/2010    r/t pt unsure of why    TIA (transient ischemic attack) 10/26/2016    Type 2 diabetes

## 2023-06-01 ENCOUNTER — OFFICE VISIT (OUTPATIENT)
Age: 68
End: 2023-06-01
Payer: MEDICARE

## 2023-06-01 VITALS
HEART RATE: 64 BPM | DIASTOLIC BLOOD PRESSURE: 70 MMHG | WEIGHT: 191 LBS | HEIGHT: 67 IN | SYSTOLIC BLOOD PRESSURE: 128 MMHG | BODY MASS INDEX: 29.98 KG/M2

## 2023-06-01 DIAGNOSIS — R60.9 EDEMA, UNSPECIFIED TYPE: ICD-10-CM

## 2023-06-01 DIAGNOSIS — I10 PRIMARY HYPERTENSION: Primary | ICD-10-CM

## 2023-06-01 PROCEDURE — G8400 PT W/DXA NO RESULTS DOC: HCPCS | Performed by: INTERNAL MEDICINE

## 2023-06-01 PROCEDURE — G8417 CALC BMI ABV UP PARAM F/U: HCPCS | Performed by: INTERNAL MEDICINE

## 2023-06-01 PROCEDURE — 1036F TOBACCO NON-USER: CPT | Performed by: INTERNAL MEDICINE

## 2023-06-01 PROCEDURE — 3017F COLORECTAL CA SCREEN DOC REV: CPT | Performed by: INTERNAL MEDICINE

## 2023-06-01 PROCEDURE — G8428 CUR MEDS NOT DOCUMENT: HCPCS | Performed by: INTERNAL MEDICINE

## 2023-06-01 PROCEDURE — 3074F SYST BP LT 130 MM HG: CPT | Performed by: INTERNAL MEDICINE

## 2023-06-01 PROCEDURE — 3078F DIAST BP <80 MM HG: CPT | Performed by: INTERNAL MEDICINE

## 2023-06-01 PROCEDURE — 99214 OFFICE O/P EST MOD 30 MIN: CPT | Performed by: INTERNAL MEDICINE

## 2023-06-01 PROCEDURE — 1090F PRES/ABSN URINE INCON ASSESS: CPT | Performed by: INTERNAL MEDICINE

## 2023-06-01 PROCEDURE — 1123F ACP DISCUSS/DSCN MKR DOCD: CPT | Performed by: INTERNAL MEDICINE

## 2023-06-01 RX ORDER — CARVEDILOL 12.5 MG/1
12.5 TABLET ORAL 2 TIMES DAILY
Qty: 180 TABLET | Refills: 3 | Status: SHIPPED | OUTPATIENT
Start: 2023-06-01

## 2023-06-01 NOTE — PROGRESS NOTES
ASSESSMENT and PLAN    1. Primary hypertension  Better- still elevated at home- increase coreg to 12.5mg bid- no med changes without contacting us-- will try to wean hydralazine to bid      2. Edema, unspecified type  Stable. Continue to monitor    3. Dyslipidemia  Stable. Continue zetia           Return in about 3 months (around 9/1/2023).          Pasquale Pascual MD  6/1/2023  9:18 AM

## 2023-09-06 ENCOUNTER — OFFICE VISIT (OUTPATIENT)
Age: 68
End: 2023-09-06

## 2023-09-06 VITALS
HEART RATE: 53 BPM | WEIGHT: 188.8 LBS | BODY MASS INDEX: 29.57 KG/M2 | DIASTOLIC BLOOD PRESSURE: 78 MMHG | SYSTOLIC BLOOD PRESSURE: 122 MMHG

## 2023-09-06 DIAGNOSIS — I10 PRIMARY HYPERTENSION: Primary | ICD-10-CM

## 2023-09-06 DIAGNOSIS — E78.00 PURE HYPERCHOLESTEROLEMIA: ICD-10-CM

## 2023-09-06 DIAGNOSIS — I48.0 PAROXYSMAL ATRIAL FIBRILLATION (HCC): ICD-10-CM

## 2023-09-06 RX ORDER — HYDRALAZINE HYDROCHLORIDE 25 MG/1
50 TABLET, FILM COATED ORAL 2 TIMES DAILY WITH MEALS
Qty: 360 TABLET | Refills: 3 | Status: SHIPPED | OUTPATIENT
Start: 2023-09-06 | End: 2023-09-06 | Stop reason: SDUPTHER

## 2023-09-06 RX ORDER — HYDRALAZINE HYDROCHLORIDE 50 MG/1
50 TABLET, FILM COATED ORAL 3 TIMES DAILY
Qty: 360 TABLET | Refills: 3 | Status: SHIPPED | OUTPATIENT
Start: 2023-09-06

## 2023-09-06 NOTE — PROGRESS NOTES
[facility] CARDIOLOGY  [address] [address] [facility]  PHONE: [phone]      23    NAME:  Jd Redman  : 1955  MRN: [id number]       SUBJECTIVE:   Jd Redman is a 77 y.o. female seen for a follow up visit regarding the following:     Chief Complaint   Patient presents with    Hypertension    Atrial Fibrillation         HPI:    No cp or bender. No orthopnea or pnd. No palpitations or syncope. Past Medical History, Past Surgical History, Family history, Social History, and Medications were all reviewed with the patient today and updated as necessary. Current Outpatient Medications   Medication Sig Dispense Refill    hydrALAZINE (APRESOLINE) 50 MG tablet Take 1 tablet by mouth 3 times daily 360 tablet 3    carvedilol (COREG) 12.5 MG tablet Take 1 tablet by mouth 2 times daily 180 tablet 3    chlorthalidone (HYGROTON) 25 MG tablet Take 1 tablet by mouth daily as needed      traZODone (DESYREL) 100 MG tablet Take 1 tablet by mouth nightly      ezetimibe (ZETIA) 10 MG tablet Take 1 tablet by mouth daily 90 tablet 3    lisinopril (PRINIVIL;ZESTRIL) 20 MG tablet Take 1 tablet by mouth 2 times daily 60 tablet 11    aspirin 81 MG chewable tablet Take 1 tablet by mouth at bedtime      oxyCODONE (OXY-IR) 15 MG immediate release tablet Take 1 tablet by mouth every 4 hours.       magnesium oxide (MAG-OX) 400 (240 Mg) MG tablet Take 1 tablet by mouth 2 times daily 60 tablet 0    multivitamin-iron-minerals-folic acid (CENTRUM) chewable tablet Take 1 tablet by mouth daily 30 tablet 3    insulin lispro (HUMALOG) 100 UNIT/ML SOLN injection vial Inject 0-8 Units into the skin 3 times daily (with meals) Medium Dose Corrective Algorithm  Glucose: Dose:    No Insulin  200-249 2 Units  250-299 4 Units  300-349 6 Units  Over 349 8 Units and notify physician 1 each 0    tiZANidine (ZANAFLEX) 4 MG tablet Take 1 tablet by mouth every 6 hours as needed (Back spasms)

## 2023-12-06 ENCOUNTER — OFFICE VISIT (OUTPATIENT)
Age: 68
End: 2023-12-06
Payer: MEDICARE

## 2023-12-06 VITALS
SYSTOLIC BLOOD PRESSURE: 122 MMHG | HEART RATE: 58 BPM | BODY MASS INDEX: 30.13 KG/M2 | DIASTOLIC BLOOD PRESSURE: 58 MMHG | HEIGHT: 67 IN | WEIGHT: 192 LBS

## 2023-12-06 DIAGNOSIS — I10 PRIMARY HYPERTENSION: ICD-10-CM

## 2023-12-06 DIAGNOSIS — E78.00 PURE HYPERCHOLESTEROLEMIA: ICD-10-CM

## 2023-12-06 DIAGNOSIS — I48.3 TYPICAL ATRIAL FLUTTER (HCC): ICD-10-CM

## 2023-12-06 DIAGNOSIS — I48.0 PAROXYSMAL ATRIAL FIBRILLATION (HCC): Primary | ICD-10-CM

## 2023-12-06 PROCEDURE — G8417 CALC BMI ABV UP PARAM F/U: HCPCS | Performed by: INTERNAL MEDICINE

## 2023-12-06 PROCEDURE — G8400 PT W/DXA NO RESULTS DOC: HCPCS | Performed by: INTERNAL MEDICINE

## 2023-12-06 PROCEDURE — 99214 OFFICE O/P EST MOD 30 MIN: CPT | Performed by: INTERNAL MEDICINE

## 2023-12-06 PROCEDURE — 3074F SYST BP LT 130 MM HG: CPT | Performed by: INTERNAL MEDICINE

## 2023-12-06 PROCEDURE — 3078F DIAST BP <80 MM HG: CPT | Performed by: INTERNAL MEDICINE

## 2023-12-06 PROCEDURE — G8484 FLU IMMUNIZE NO ADMIN: HCPCS | Performed by: INTERNAL MEDICINE

## 2023-12-06 PROCEDURE — 1090F PRES/ABSN URINE INCON ASSESS: CPT | Performed by: INTERNAL MEDICINE

## 2023-12-06 PROCEDURE — 1036F TOBACCO NON-USER: CPT | Performed by: INTERNAL MEDICINE

## 2023-12-06 PROCEDURE — 3017F COLORECTAL CA SCREEN DOC REV: CPT | Performed by: INTERNAL MEDICINE

## 2023-12-06 PROCEDURE — G8428 CUR MEDS NOT DOCUMENT: HCPCS | Performed by: INTERNAL MEDICINE

## 2023-12-06 PROCEDURE — 1123F ACP DISCUSS/DSCN MKR DOCD: CPT | Performed by: INTERNAL MEDICINE

## 2023-12-06 RX ORDER — HYDRALAZINE HYDROCHLORIDE 100 MG/1
100 TABLET, FILM COATED ORAL 3 TIMES DAILY
Qty: 90 TABLET | Refills: 3 | Status: SHIPPED | OUTPATIENT
Start: 2023-12-06

## 2023-12-06 NOTE — PROGRESS NOTES
afternoon. Continue coreg and increase hydralazine to 100mg tid      3. Pure hypercholesterolemia  Stable. Continue to monitor      4. Typical atrial flutter (HCC)  Stable. Continue coreg         Return in about 6 months (around 6/6/2024).          Susan Frias MD  12/6/2023  11:17 AM

## 2024-05-02 NOTE — TELEPHONE ENCOUNTER
Requested Prescriptions     Pending Prescriptions Disp Refills    carvedilol (COREG) 12.5 MG tablet 180 tablet 3     Sig: Take 1 tablet by mouth 2 times daily    ezetimibe (ZETIA) 10 MG tablet 90 tablet 3     Sig: Take 1 tablet by mouth daily    hydrALAZINE (APRESOLINE) 100 MG tablet 270 tablet 3     Sig: Take 1 tablet by mouth 3 times daily    lisinopril (PRINIVIL;ZESTRIL) 20 MG tablet 180 tablet 3     Sig: Take 1 tablet by mouth 2 times daily     Rx verified last ov 12/6/23

## 2024-05-02 NOTE — TELEPHONE ENCOUNTER
Wants the refills on the 4 meds Dr Shin gives her     [facility] pharmacy [phone]         SHE IS OUT  Said she left on pharmacy line on Tuesday but I dont see anything

## 2024-05-06 RX ORDER — HYDRALAZINE HYDROCHLORIDE 100 MG/1
100 TABLET, FILM COATED ORAL 3 TIMES DAILY
Qty: 90 TABLET | Refills: 3 | OUTPATIENT
Start: 2024-05-06

## 2024-05-06 RX ORDER — EZETIMIBE 10 MG/1
10 TABLET ORAL DAILY
Qty: 90 TABLET | Refills: 3 | Status: SHIPPED | OUTPATIENT
Start: 2024-05-06

## 2024-05-06 RX ORDER — LISINOPRIL 20 MG/1
20 TABLET ORAL 2 TIMES DAILY
Qty: 180 TABLET | Refills: 3 | Status: SHIPPED | OUTPATIENT
Start: 2024-05-06

## 2024-05-06 RX ORDER — HYDRALAZINE HYDROCHLORIDE 100 MG/1
100 TABLET, FILM COATED ORAL 3 TIMES DAILY
Qty: 270 TABLET | Refills: 3 | Status: SHIPPED | OUTPATIENT
Start: 2024-05-06

## 2024-05-06 RX ORDER — CARVEDILOL 12.5 MG/1
12.5 TABLET ORAL 2 TIMES DAILY
Qty: 180 TABLET | Refills: 3 | Status: SHIPPED | OUTPATIENT
Start: 2024-05-06

## 2024-06-19 ENCOUNTER — OFFICE VISIT (OUTPATIENT)
Age: 69
End: 2024-06-19
Payer: MEDICARE

## 2024-06-19 VITALS
WEIGHT: 190 LBS | HEIGHT: 67 IN | HEART RATE: 52 BPM | BODY MASS INDEX: 29.82 KG/M2 | SYSTOLIC BLOOD PRESSURE: 112 MMHG | DIASTOLIC BLOOD PRESSURE: 60 MMHG

## 2024-06-19 DIAGNOSIS — I10 PRIMARY HYPERTENSION: ICD-10-CM

## 2024-06-19 DIAGNOSIS — I48.3 TYPICAL ATRIAL FLUTTER (HCC): Primary | ICD-10-CM

## 2024-06-19 DIAGNOSIS — E78.00 PURE HYPERCHOLESTEROLEMIA: ICD-10-CM

## 2024-06-19 PROCEDURE — 3078F DIAST BP <80 MM HG: CPT | Performed by: INTERNAL MEDICINE

## 2024-06-19 PROCEDURE — 3074F SYST BP LT 130 MM HG: CPT | Performed by: INTERNAL MEDICINE

## 2024-06-19 PROCEDURE — G8417 CALC BMI ABV UP PARAM F/U: HCPCS | Performed by: INTERNAL MEDICINE

## 2024-06-19 PROCEDURE — 3017F COLORECTAL CA SCREEN DOC REV: CPT | Performed by: INTERNAL MEDICINE

## 2024-06-19 PROCEDURE — G8427 DOCREV CUR MEDS BY ELIG CLIN: HCPCS | Performed by: INTERNAL MEDICINE

## 2024-06-19 PROCEDURE — 99214 OFFICE O/P EST MOD 30 MIN: CPT | Performed by: INTERNAL MEDICINE

## 2024-06-19 PROCEDURE — 1123F ACP DISCUSS/DSCN MKR DOCD: CPT | Performed by: INTERNAL MEDICINE

## 2024-06-19 PROCEDURE — G8400 PT W/DXA NO RESULTS DOC: HCPCS | Performed by: INTERNAL MEDICINE

## 2024-06-19 PROCEDURE — 1090F PRES/ABSN URINE INCON ASSESS: CPT | Performed by: INTERNAL MEDICINE

## 2024-06-19 PROCEDURE — 1036F TOBACCO NON-USER: CPT | Performed by: INTERNAL MEDICINE

## 2024-06-19 RX ORDER — METFORMIN HYDROCHLORIDE 500 MG/1
500 TABLET, EXTENDED RELEASE ORAL 2 TIMES DAILY
COMMUNITY
Start: 2024-05-14 | End: 2025-05-14

## 2024-06-19 RX ORDER — HYDRALAZINE HYDROCHLORIDE 100 MG/1
50 TABLET, FILM COATED ORAL 4 TIMES DAILY
Qty: 360 TABLET | Refills: 3 | Status: SHIPPED | OUTPATIENT
Start: 2024-06-19 | End: 2024-06-19 | Stop reason: DRUGHIGH

## 2024-06-19 NOTE — TELEPHONE ENCOUNTER
Requested Prescriptions     Pending Prescriptions Disp Refills    hydrALAZINE (APRESOLINE) 50 MG tablet 270 tablet 3     Sig: Take 1 tablet by mouth 3 times daily    hydrALAZINE (APRESOLINE) 25 MG tablet 270 tablet 3     Sig: Take 1 tablet by mouth 3 times daily

## 2024-06-19 NOTE — PROGRESS NOTES
[facility] CARDIOLOGY  [address] [place] [address], [address]  [place] [address]  PHONE: [phone]      24    NAME:  Randi Lizarraga  : 1955  MRN: [id number]       SUBJECTIVE:   Randi Lizarraga is a 69 y.o. female seen for a follow up visit regarding the following:     Chief Complaint   Patient presents with    Atrial Fibrillation    Hypertension         HPI:    No cp or bender. No orthopnea or pnd. No palpitations or syncope.      Past Medical History, Past Surgical History, Family history, Social History, and Medications were all reviewed with the patient today and updated as necessary.     Current Outpatient Medications   Medication Sig Dispense Refill    metFORMIN (GLUCOPHAGE-XR) 500 MG extended release tablet Take 1 tablet by mouth 2 times daily      carvedilol (COREG) 12.5 MG tablet Take 1 tablet by mouth 2 times daily 180 tablet 3    ezetimibe (ZETIA) 10 MG tablet Take 1 tablet by mouth daily 90 tablet 3    hydrALAZINE (APRESOLINE) 100 MG tablet Take 1 tablet by mouth 3 times daily 270 tablet 3    lisinopril (PRINIVIL;ZESTRIL) 20 MG tablet Take 1 tablet by mouth 2 times daily 180 tablet 3    chlorthalidone (HYGROTON) 25 MG tablet Take 1 tablet by mouth daily as needed      traZODone (DESYREL) 100 MG tablet Take 1 tablet by mouth nightly      aspirin 81 MG chewable tablet Take 1 tablet by mouth at bedtime      oxyCODONE (OXY-IR) 15 MG immediate release tablet Take 1 tablet by mouth every 6 hours as needed.      magnesium oxide (MAG-OX) 400 (240 Mg) MG tablet Take 1 tablet by mouth 2 times daily 60 tablet 0    nystatin (MYCOSTATIN) 497861 UNIT/GM powder Apply topically 4 times daily. 1 each 0    multivitamin-iron-minerals-folic acid (CENTRUM) chewable tablet Take 1 tablet by mouth daily 30 tablet 3    insulin lispro (HUMALOG) 100 UNIT/ML SOLN injection vial Inject 0-8 Units into the skin 3 times daily (with meals) Medium Dose Corrective Algorithm  Glucose: Dose:    No

## 2024-06-20 RX ORDER — HYDRALAZINE HYDROCHLORIDE 25 MG/1
25 TABLET, FILM COATED ORAL 3 TIMES DAILY
Qty: 270 TABLET | Refills: 3 | Status: SHIPPED | OUTPATIENT
Start: 2024-06-20

## 2024-06-20 RX ORDER — HYDRALAZINE HYDROCHLORIDE 50 MG/1
50 TABLET, FILM COATED ORAL 3 TIMES DAILY
Qty: 270 TABLET | Refills: 3 | Status: SHIPPED | OUTPATIENT
Start: 2024-06-20

## 2024-09-06 RX ORDER — HYDRALAZINE HYDROCHLORIDE 25 MG/1
25 TABLET, FILM COATED ORAL 4 TIMES DAILY
Qty: 360 TABLET | Refills: 1 | Status: SHIPPED | OUTPATIENT
Start: 2024-09-06

## 2024-09-06 NOTE — TELEPHONE ENCOUNTER
Requested Prescriptions     Pending Prescriptions Disp Refills    hydrALAZINE (APRESOLINE) 25 MG tablet 360 tablet 1     Sig: Take 1 tablet by mouth 4 times daily     Spoke with patient. She's been taking hydralazine 4 times a day.   Per Kip last OV:  Blood pressure is now too low at times.  Seems to bottom out after the 100 mg hydralazine.  Will change hydralazine to 50 mg 4 times a day

## 2024-09-06 NOTE — TELEPHONE ENCOUNTER
Patient would like a call back about a medications request. Patient is confused about directions and wants clarification.     PATIENT IS ALMOST OUT OF MEDICATION      MEDICATION REFILL REQUEST          Name of Medication:  hydrALAZINE  Dose:  50mg and 25mg  Frequency:  3x a day  Quantity:    Days' supply:            Pharmacy Name/Location:  [facility] in [facility]

## 2024-09-06 NOTE — TELEPHONE ENCOUNTER
Prescription sent to pharmacy//[facility]  Requested Prescriptions     Signed Prescriptions Disp Refills    hydrALAZINE (APRESOLINE) 25 MG tablet 360 tablet 1     Sig: Take 1 tablet by mouth 4 times daily     Authorizing Provider: EBNOY CHATTERJEE     Ordering User: EVERT ROSAS

## 2024-11-25 ENCOUNTER — OFFICE VISIT (OUTPATIENT)
Age: 69
End: 2024-11-25
Payer: MEDICARE

## 2024-11-25 VITALS
BODY MASS INDEX: 28.41 KG/M2 | DIASTOLIC BLOOD PRESSURE: 84 MMHG | HEART RATE: 62 BPM | SYSTOLIC BLOOD PRESSURE: 128 MMHG | WEIGHT: 181 LBS | HEIGHT: 67 IN

## 2024-11-25 DIAGNOSIS — R60.9 EDEMA, UNSPECIFIED TYPE: ICD-10-CM

## 2024-11-25 DIAGNOSIS — E78.00 PURE HYPERCHOLESTEROLEMIA: ICD-10-CM

## 2024-11-25 DIAGNOSIS — I48.0 PAROXYSMAL ATRIAL FIBRILLATION (HCC): Primary | ICD-10-CM

## 2024-11-25 DIAGNOSIS — I10 PRIMARY HYPERTENSION: ICD-10-CM

## 2024-11-25 PROCEDURE — 1036F TOBACCO NON-USER: CPT | Performed by: INTERNAL MEDICINE

## 2024-11-25 PROCEDURE — G8484 FLU IMMUNIZE NO ADMIN: HCPCS | Performed by: INTERNAL MEDICINE

## 2024-11-25 PROCEDURE — G8400 PT W/DXA NO RESULTS DOC: HCPCS | Performed by: INTERNAL MEDICINE

## 2024-11-25 PROCEDURE — 3017F COLORECTAL CA SCREEN DOC REV: CPT | Performed by: INTERNAL MEDICINE

## 2024-11-25 PROCEDURE — 99214 OFFICE O/P EST MOD 30 MIN: CPT | Performed by: INTERNAL MEDICINE

## 2024-11-25 PROCEDURE — 93000 ELECTROCARDIOGRAM COMPLETE: CPT | Performed by: INTERNAL MEDICINE

## 2024-11-25 PROCEDURE — 3079F DIAST BP 80-89 MM HG: CPT | Performed by: INTERNAL MEDICINE

## 2024-11-25 PROCEDURE — G8417 CALC BMI ABV UP PARAM F/U: HCPCS | Performed by: INTERNAL MEDICINE

## 2024-11-25 PROCEDURE — 3074F SYST BP LT 130 MM HG: CPT | Performed by: INTERNAL MEDICINE

## 2024-11-25 PROCEDURE — 1123F ACP DISCUSS/DSCN MKR DOCD: CPT | Performed by: INTERNAL MEDICINE

## 2024-11-25 PROCEDURE — G8428 CUR MEDS NOT DOCUMENT: HCPCS | Performed by: INTERNAL MEDICINE

## 2024-11-25 PROCEDURE — 1090F PRES/ABSN URINE INCON ASSESS: CPT | Performed by: INTERNAL MEDICINE

## 2024-11-25 RX ORDER — HYDRALAZINE HYDROCHLORIDE 50 MG/1
50 TABLET, FILM COATED ORAL 4 TIMES DAILY
Qty: 360 TABLET | Refills: 3 | Status: SHIPPED | OUTPATIENT
Start: 2024-11-25

## 2024-11-25 RX ORDER — HYDRALAZINE HYDROCHLORIDE 25 MG/1
25 TABLET, FILM COATED ORAL 4 TIMES DAILY
Qty: 360 TABLET | Refills: 1 | Status: SHIPPED | OUTPATIENT
Start: 2024-11-25

## 2024-11-25 RX ORDER — OXYCODONE 9 MG/1
CAPSULE, EXTENDED RELEASE ORAL
COMMUNITY
Start: 2024-10-27

## 2024-11-25 NOTE — PROGRESS NOTES
[facility] CARDIOLOGY  [address] [place] [address], [place] [address]  PHONE: [phone]      24    NAME:  Randi Lizarraga  : 1955  MRN: [id number]       SUBJECTIVE:   Randi Lizarraga is a 69 y.o. female seen for a follow up visit regarding the following:     Chief Complaint   Patient presents with    Atrial Fibrillation         HPI:    No cp or bender. No orthopnea or pnd. No palpitations or syncope.  She is titration meds based on bp.  BP till very labile    Past Medical History, Past Surgical History, Family history, Social History, and Medications were all reviewed with the patient today and updated as necessary.     Current Outpatient Medications   Medication Sig Dispense Refill    XTAMPZA ER 9 MG C12A       hydrALAZINE (APRESOLINE) 25 MG tablet Take 1 tablet by mouth 4 times daily 360 tablet 1    hydrALAZINE (APRESOLINE) 50 MG tablet Take 1 tablet by mouth 3 times daily (Patient taking differently: Take 1 tablet by mouth 4 times daily) 270 tablet 3    metFORMIN (GLUCOPHAGE-XR) 500 MG extended release tablet Take 1 tablet by mouth 2 times daily      carvedilol (COREG) 12.5 MG tablet Take 1 tablet by mouth 2 times daily 180 tablet 3    ezetimibe (ZETIA) 10 MG tablet Take 1 tablet by mouth daily 90 tablet 3    lisinopril (PRINIVIL;ZESTRIL) 20 MG tablet Take 1 tablet by mouth 2 times daily 180 tablet 3    chlorthalidone (HYGROTON) 25 MG tablet Take 1 tablet by mouth daily as needed      traZODone (DESYREL) 100 MG tablet Take 1 tablet by mouth nightly      aspirin 81 MG chewable tablet Take 1 tablet by mouth at bedtime      oxyCODONE (OXY-IR) 15 MG immediate release tablet Take 1 tablet by mouth every 6 hours as needed.      magnesium oxide (MAG-OX) 400 (240 Mg) MG tablet Take 1 tablet by mouth 2 times daily 60 tablet 0    multivitamin-iron-minerals-folic acid (CENTRUM) chewable tablet Take 1 tablet by mouth daily 30 tablet 3    insulin lispro (HUMALOG) 100 UNIT/ML SOLN injection

## 2025-02-13 NOTE — PLAN OF CARE
Problem: Discharge Planning  Goal: Discharge to home or other facility with appropriate resources  Outcome: Progressing  Flowsheets  Taken 10/5/2022 0416  Discharge to home or other facility with appropriate resources: (right leg cellulitis) Identify barriers to discharge with patient and caregiver  Taken 10/5/2022 0414  Discharge to home or other facility with appropriate resources: Identify barriers to discharge with patient and caregiver     Problem: Pain  Goal: Verbalizes/displays adequate comfort level or baseline comfort level  Outcome: Progressing  Flowsheets (Taken 10/5/2022 0406)  Verbalizes/displays adequate comfort level or baseline comfort level: Encourage patient to monitor pain and request assistance     Problem: Safety - Adult  Goal: Free from fall injury  Outcome: Progressing     Problem: Skin/Tissue Integrity  Goal: Absence of new skin breakdown  Description: 1. Monitor for areas of redness and/or skin breakdown  2. Assess vascular access sites hourly  3. Every 4-6 hours minimum:  Change oxygen saturation probe site  4. Every 4-6 hours:  If on nasal continuous positive airway pressure, respiratory therapy assess nares and determine need for appliance change or resting period.   Outcome: Progressing Quality 431: Preventive Care And Screening: Unhealthy Alcohol Use - Screening: Patient not identified as an unhealthy alcohol user when screened for unhealthy alcohol use using a systematic screening method Detail Level: Generalized Quality 226: Preventive Care And Screening: Tobacco Use: Screening And Cessation Intervention: Tobacco Screening not Performed

## 2025-05-12 RX ORDER — EZETIMIBE 10 MG/1
10 TABLET ORAL DAILY
Qty: 90 TABLET | Refills: 3 | Status: SHIPPED | OUTPATIENT
Start: 2025-05-12

## 2025-05-12 RX ORDER — LISINOPRIL 20 MG/1
20 TABLET ORAL 2 TIMES DAILY
Qty: 180 TABLET | Refills: 3 | Status: SHIPPED | OUTPATIENT
Start: 2025-05-12

## 2025-05-12 RX ORDER — CARVEDILOL 12.5 MG/1
12.5 TABLET ORAL 2 TIMES DAILY
Qty: 180 TABLET | Refills: 3 | Status: SHIPPED | OUTPATIENT
Start: 2025-05-12

## 2025-05-12 RX ORDER — HYDRALAZINE HYDROCHLORIDE 25 MG/1
25 TABLET, FILM COATED ORAL 4 TIMES DAILY
Qty: 360 TABLET | Refills: 3 | Status: SHIPPED | OUTPATIENT
Start: 2025-05-12

## 2025-05-12 NOTE — TELEPHONE ENCOUNTER
Requested Prescriptions     Pending Prescriptions Disp Refills    carvedilol (COREG) 12.5 MG tablet 180 tablet 3     Sig: Take 1 tablet by mouth 2 times daily    ezetimibe (ZETIA) 10 MG tablet 90 tablet 3     Sig: Take 1 tablet by mouth daily    hydrALAZINE (APRESOLINE) 25 MG tablet 360 tablet 3     Sig: Take 1 tablet by mouth 4 times daily    lisinopril (PRINIVIL;ZESTRIL) 20 MG tablet 180 tablet 3     Sig: Take 1 tablet by mouth 2 times daily

## 2025-05-12 NOTE — TELEPHONE ENCOUNTER
Pt is calling asking for refills for all of her meds  Please call into [phone]  Any questions call pt

## 2025-07-17 RX ORDER — CHLORTHALIDONE 25 MG/1
25 TABLET ORAL DAILY PRN
Qty: 30 TABLET | Refills: 5 | Status: SHIPPED | OUTPATIENT
Start: 2025-07-17

## 2025-07-17 NOTE — TELEPHONE ENCOUNTER
Requested Prescriptions     Pending Prescriptions Disp Refills    chlorthalidone (HYGROTON) 25 MG tablet 30 tablet 5     Sig: Take 1 tablet by mouth daily as needed (HTN)

## 2025-07-17 NOTE — TELEPHONE ENCOUNTER
MEDICATION REFILL REQUEST      Name of Medication:  Chlorthalidone   Dose:  25 mg  Frequency:  daily or as needed  Quantity:    Days' supply:  30 day      Pharmacy Name/Location:  [facility]

## 2025-08-07 ENCOUNTER — TELEPHONE (OUTPATIENT)
Age: 70
End: 2025-08-07

## 2025-09-02 ENCOUNTER — OFFICE VISIT (OUTPATIENT)
Age: 70
End: 2025-09-02
Payer: COMMERCIAL

## 2025-09-02 VITALS
HEART RATE: 60 BPM | WEIGHT: 194 LBS | BODY MASS INDEX: 30.45 KG/M2 | HEIGHT: 67 IN | SYSTOLIC BLOOD PRESSURE: 80 MMHG | DIASTOLIC BLOOD PRESSURE: 52 MMHG

## 2025-09-02 DIAGNOSIS — R01.1 HEART MURMUR: ICD-10-CM

## 2025-09-02 DIAGNOSIS — E78.00 PURE HYPERCHOLESTEROLEMIA: Primary | ICD-10-CM

## 2025-09-02 DIAGNOSIS — I10 PRIMARY HYPERTENSION: ICD-10-CM

## 2025-09-02 DIAGNOSIS — I48.0 PAROXYSMAL ATRIAL FIBRILLATION (HCC): ICD-10-CM

## 2025-09-02 DIAGNOSIS — R60.0 EDEMA, LOWER EXTREMITY: ICD-10-CM

## 2025-09-02 PROCEDURE — 3074F SYST BP LT 130 MM HG: CPT | Performed by: INTERNAL MEDICINE

## 2025-09-02 PROCEDURE — 1123F ACP DISCUSS/DSCN MKR DOCD: CPT | Performed by: INTERNAL MEDICINE

## 2025-09-02 PROCEDURE — 99214 OFFICE O/P EST MOD 30 MIN: CPT | Performed by: INTERNAL MEDICINE

## 2025-09-02 PROCEDURE — 1126F AMNT PAIN NOTED NONE PRSNT: CPT | Performed by: INTERNAL MEDICINE

## 2025-09-02 PROCEDURE — 3078F DIAST BP <80 MM HG: CPT | Performed by: INTERNAL MEDICINE

## 2025-09-02 RX ORDER — CARVEDILOL 12.5 MG/1
12.5 TABLET ORAL 2 TIMES DAILY
Qty: 180 TABLET | Refills: 3 | Status: SHIPPED | OUTPATIENT
Start: 2025-09-02

## 2025-09-02 RX ORDER — HYDRALAZINE HYDROCHLORIDE 50 MG/1
50 TABLET, FILM COATED ORAL 4 TIMES DAILY
Qty: 360 TABLET | Refills: 3 | Status: SHIPPED | OUTPATIENT
Start: 2025-09-02

## 2025-09-02 RX ORDER — LISINOPRIL 20 MG/1
20 TABLET ORAL 2 TIMES DAILY
Qty: 180 TABLET | Refills: 3 | Status: SHIPPED | OUTPATIENT
Start: 2025-09-02

## 2025-09-02 RX ORDER — FUROSEMIDE 20 MG/1
20 TABLET ORAL DAILY
Qty: 60 TABLET | Refills: 3 | Status: SHIPPED | OUTPATIENT
Start: 2025-09-02

## 2025-09-02 RX ORDER — FUROSEMIDE 20 MG/1
20 TABLET ORAL DAILY PRN
Qty: 90 TABLET | Refills: 3 | Status: SHIPPED | OUTPATIENT
Start: 2025-09-02

## 2025-09-02 RX ORDER — LANOLIN ALCOHOL/MO/W.PET/CERES
400 CREAM (GRAM) TOPICAL 2 TIMES DAILY
Qty: 60 TABLET | Refills: 0 | Status: SHIPPED | OUTPATIENT
Start: 2025-09-02

## (undated) DEVICE — KENDALL RADIOLUCENT FOAM MONITORING ELECTRODE RECTANGULAR SHAPE: Brand: KENDALL

## (undated) DEVICE — DRESSING HYDROFIBER AQUACEL AG ADVANTAGE 3.5X6 IN

## (undated) DEVICE — NDL PRT INJ NSAF BLNT 18GX1.5 --

## (undated) DEVICE — SUTURE VCRL + SZ 3-0 L18IN ABSRB UD PS-2 3/8 CIR REV CUT VCP497H

## (undated) DEVICE — BIPOLAR SEALER 23-112-1 AQM 6.0: Brand: AQUAMANTYS ®

## (undated) DEVICE — 3M™ TEGADERM™ TRANSPARENT FILM DRESSING FRAME STYLE, 1626W, 4 IN X 4-3/4 IN (10 CM X 12 CM), 50/CT 4CT/CASE: Brand: 3M™ TEGADERM™

## (undated) DEVICE — CONNECTOR TBNG OD5-7MM O2 END DISP

## (undated) DEVICE — POSTERIOR LAMI VANPLT-LUCAS: Brand: MEDLINE INDUSTRIES, INC.

## (undated) DEVICE — SUTURE PERMAHAND SZ 0 L30IN NONABSORBABLE BLK L30MM PSL 3/8 590H

## (undated) DEVICE — UNIVERSAL DRAPES: Brand: MEDLINE INDUSTRIES, INC.

## (undated) DEVICE — SYR 3ML LL TIP 1/10ML GRAD --

## (undated) DEVICE — DRAPE TWL SURG 16X26IN BLU ORB04] ALLCARE INC]

## (undated) DEVICE — AGENT HEMSTAT W3XL4IN OXIDIZED REGENERATED CELOS ABSRB FOR

## (undated) DEVICE — ZIP 8I SURGICAL SKIN CLOSURE DEVICE: Brand: ZIP 8I SURGICAL SKIN CLOSURE DEVICE

## (undated) DEVICE — CANNULA NSL ORAL AD FOR CAPNOFLEX CO2 O2 AIRLFE

## (undated) DEVICE — SOLUTION IRRIG 1000ML 09% SOD CHL USP PIC PLAS CONTAINER

## (undated) DEVICE — BLOCK BITE AD 60FR W/ VELC STRP ADDRESSES MOST PT AND

## (undated) DEVICE — SPONGE,NEURO,0.5"X1",XR,STRL,LF,10/PK: Brand: MEDLINE

## (undated) DEVICE — CLOTH PRE OP W9XL10.5IN 2% CHG FRAGRANCE RNS FREE READYPREP

## (undated) DEVICE — 3M™ TEGADERM™ TRANSPARENT FILM DRESSING FRAME STYLE, 1628, 6 IN X 8 IN (15 CM X 20 CM), 10/CT 8CT/CASE: Brand: 3M™ TEGADERM™

## (undated) DEVICE — PAD,NON-ADHERENT,3X8,STERILE,LF,1/PK: Brand: MEDLINE

## (undated) DEVICE — DISPOSABLE STANDARD BIPOLAR FORCEPS, NON-STICK,: Brand: SPETZLER-MALIS

## (undated) DEVICE — SYSTEM SKIN CLSR 22CM DERMBND PRINEO

## (undated) DEVICE — SUTURE VCRL + SZ 3-0 L18IN ABSRB UD SH 1/2 CIR TAPERCUT NDL VCP864D

## (undated) DEVICE — SPINE NEURO: Brand: MEDLINE INDUSTRIES, INC.

## (undated) DEVICE — SYRINGE MED 10ML LUERLOCK TIP W/O SFTY DISP

## (undated) DEVICE — SYR 5ML 1/5 GRAD LL NSAF LF --

## (undated) DEVICE — SUTURE VCRL SZ 2-0 L27IN ABSRB UD L36MM CP-1 1/2 CIR REV J266H

## (undated) DEVICE — BIPOLAR SEALER 23-113-1 AQM 2.3 OM NEURO: Brand: AQUAMANTYS ®

## (undated) DEVICE — AGENT HEMOSTATIC SURGIFLOW MATRIX KIT W/THROMBIN

## (undated) DEVICE — SUTURE VCRL SZ 1 L27IN ABSRB UD L36MM CP-1 1/2 CIR REV CUT J268H

## (undated) DEVICE — MASTISOL ADHESIVE LIQ 2/3ML